# Patient Record
Sex: MALE | Race: WHITE | NOT HISPANIC OR LATINO | Employment: UNEMPLOYED | URBAN - METROPOLITAN AREA
[De-identification: names, ages, dates, MRNs, and addresses within clinical notes are randomized per-mention and may not be internally consistent; named-entity substitution may affect disease eponyms.]

---

## 2017-01-05 ENCOUNTER — OFFICE VISIT (OUTPATIENT)
Dept: FAMILY MEDICINE CLINIC | Facility: CLINIC | Age: 77
End: 2017-01-05

## 2017-01-05 VITALS
TEMPERATURE: 98 F | HEIGHT: 65 IN | WEIGHT: 210 LBS | BODY MASS INDEX: 34.99 KG/M2 | OXYGEN SATURATION: 92 % | HEART RATE: 55 BPM | SYSTOLIC BLOOD PRESSURE: 130 MMHG | DIASTOLIC BLOOD PRESSURE: 70 MMHG

## 2017-01-05 DIAGNOSIS — E11.8 TYPE 2 DIABETES MELLITUS WITH COMPLICATION, WITH LONG-TERM CURRENT USE OF INSULIN (HCC): Primary | ICD-10-CM

## 2017-01-05 DIAGNOSIS — I10 ESSENTIAL HYPERTENSION: ICD-10-CM

## 2017-01-05 DIAGNOSIS — I25.10 CORONARY ARTERIOSCLEROSIS IN NATIVE ARTERY: ICD-10-CM

## 2017-01-05 DIAGNOSIS — I48.0 PAROXYSMAL ATRIAL FIBRILLATION (HCC): ICD-10-CM

## 2017-01-05 DIAGNOSIS — Z86.79 HISTORY OF ATRIAL FIBRILLATION: ICD-10-CM

## 2017-01-05 DIAGNOSIS — Z79.4 TYPE 2 DIABETES MELLITUS WITH COMPLICATION, WITH LONG-TERM CURRENT USE OF INSULIN (HCC): Primary | ICD-10-CM

## 2017-01-05 PROCEDURE — 99213 OFFICE O/P EST LOW 20 MIN: CPT | Performed by: FAMILY MEDICINE

## 2017-01-05 RX ORDER — SIMVASTATIN 80 MG
80 TABLET ORAL DAILY
Qty: 90 TABLET | Refills: 3 | Status: SHIPPED | OUTPATIENT
Start: 2017-01-05 | End: 2018-01-04 | Stop reason: SDUPTHER

## 2017-01-05 RX ORDER — FUROSEMIDE 40 MG/1
40 TABLET ORAL DAILY
Qty: 90 TABLET | Refills: 3 | Status: SHIPPED | OUTPATIENT
Start: 2017-01-05 | End: 2018-01-04 | Stop reason: SDUPTHER

## 2017-01-05 RX ORDER — METOPROLOL TARTRATE 50 MG/1
50 TABLET, FILM COATED ORAL 2 TIMES DAILY
Qty: 180 TABLET | Refills: 3 | Status: SHIPPED | OUTPATIENT
Start: 2017-01-05 | End: 2018-01-04 | Stop reason: SDUPTHER

## 2017-01-05 RX ORDER — NIACIN 250 MG
250 TABLET ORAL
COMMUNITY
End: 2017-12-19 | Stop reason: ALTCHOICE

## 2017-01-05 RX ORDER — ENALAPRIL MALEATE 2.5 MG/1
2.5 TABLET ORAL DAILY
Qty: 90 TABLET | Refills: 3 | Status: SHIPPED | OUTPATIENT
Start: 2017-01-05 | End: 2018-01-04 | Stop reason: SDUPTHER

## 2017-01-05 NOTE — PROGRESS NOTES
Subjective   Bunny Chacon is a 76 y.o. male. Presents today for   Chief Complaint   Patient presents with   • Hypertension     pt here for med review and labs.   • Diabetes     pt due for diabetic foot exam.       History of Present Illness  Patient here for annual check;  Hx of dm2, htn, hld, cad and a. Fib;  On chronic anticoagulation and for dm2 insulin.  Mgmt of dm2, htn, hld by Dr. Rios and CAD and anticoagulation by Dr. Pemberton, cardiology.  Would like labs preappt for Dr. Rios of which ordered today.  Patient doing well;  Last a1c to goal;  No c/o today.  Is due for dm2 foot exam.    Review of Systems   Respiratory: Negative for shortness of breath.    Cardiovascular: Negative for chest pain, palpitations and leg swelling.   Gastrointestinal: Negative for abdominal pain, nausea and vomiting.   Neurological: Negative for syncope.       The following portions of the patient's history were reviewed and updated as appropriate: allergies, current medications, past medical history and problem list.    Patient Active Problem List   Diagnosis   • Type 2 diabetes mellitus with complications   • Hyperlipidemia   • Essential hypertension   • Coronary arteriosclerosis in native artery   • Sleep apnea   • Paroxysmal atrial fibrillation   • Macular degeneration   • Diabetic retinopathy associated with diabetes mellitus due to underlying condition   • History of atrial fibrillation       No Known Allergies    Current Outpatient Prescriptions on File Prior to Visit   Medication Sig Dispense Refill   • aspirin 81 MG tablet Take 81 mg by mouth daily.     • glucose blood test strip Testing bs 5 x day (Patient taking differently: Testing bs 6 x day ) 500 each 2   • insulin NPH (HumuLIN,NovoLIN) 100 UNIT/ML injection Inject  under the skin. 44   UNITS BREAKFAST 6 UNITS HS     • Insulin Pen Needle ([employer] PEN NEEDLES) 31G X 6 MM misc USE AS DIRECTED.     • insulin regular (HumuLIN R,NovoLIN R) 100 UNIT/ML injection Inject  "under the skin 3 (three) times a day before meals. 5 UNITS  AT BREAKFAST , 11 UNITS AT SUPPER     • Multiple Vitamin (MULTI VITAMIN DAILY) tablet Take 1 tablet by mouth daily.     • multivitamin-minerals (OCUVITE) tablet Take 1 tablet by mouth 2 (two) times a day.     • Omega-3 Fatty Acids (GNP FISH OIL) 1000 MG capsule Take 1 capsule by mouth daily.     • ONETOUCH DELICA LANCETS FINE misc      • warfarin (COUMADIN) 7.5 MG tablet Take 1 tablet by mouth daily.     • [DISCONTINUED] enalapril (VASOTEC) 2.5 MG tablet Take 2.5 mg by mouth daily.     • [DISCONTINUED] furosemide (LASIX) 40 MG tablet Take 40 mg by mouth daily.     • [DISCONTINUED] metoprolol tartrate (LOPRESSOR) 50 MG tablet Take 50 mg by mouth 2 (two) times a day.     • [DISCONTINUED] simvastatin (ZOCOR) 80 MG tablet Take 80 mg by mouth daily.       No current facility-administered medications on file prior to visit.        Objective   Vitals:    01/05/17 1332   BP: 130/70   BP Location: Right arm   Patient Position: Sitting   Cuff Size: Large Adult   Pulse: 55   Temp: 98 °F (36.7 °C)   TempSrc: Oral   SpO2: 92%   Weight: 210 lb (95.3 kg)   Height: 65\" (165.1 cm)       Physical Exam   Constitutional: He appears well-developed and well-nourished.   HENT:   Head: Normocephalic and atraumatic.   Neck: Neck supple. No JVD present. No thyromegaly present.   Cardiovascular: Normal rate, regular rhythm and normal heart sounds.  Exam reveals no gallop and no friction rub.    No murmur heard.  Pulmonary/Chest: Effort normal and breath sounds normal. No respiratory distress. He has no wheezes. He has no rales.   Abdominal: Soft. Bowel sounds are normal. He exhibits no distension. There is no tenderness. There is no rebound and no guarding.   Musculoskeletal: He exhibits no edema.    Bunny had a diabetic foot exam performed (diabetic foot exam) today.   During the foot exam he had a monofilament test performed.  Neurological: He is alert.   Skin: Skin is warm and " dry.   Psychiatric: He has a normal mood and affect. His behavior is normal.   Nursing note and vitals reviewed.      Assessment/Plan   Bunny was seen today for hypertension and diabetes.    Diagnoses and all orders for this visit:    Type 2 diabetes mellitus with complication, with long-term current use of insulin  -     Comprehensive Metabolic Panel  -     Lipid Panel  -     Hemoglobin A1c  -     Microalbumin / Creatinine Urine Ratio  -     enalapril (VASOTEC) 2.5 MG tablet; Take 1 tablet by mouth Daily.    Essential hypertension  -     Comprehensive Metabolic Panel  -     enalapril (VASOTEC) 2.5 MG tablet; Take 1 tablet by mouth Daily.  -     metoprolol tartrate (LOPRESSOR) 50 MG tablet; Take 1 tablet by mouth 2 (Two) Times a Day.    Coronary arteriosclerosis in native artery  -     Comprehensive Metabolic Panel  -     Lipid Panel  -     furosemide (LASIX) 40 MG tablet; Take 1 tablet by mouth Daily.  -     metoprolol tartrate (LOPRESSOR) 50 MG tablet; Take 1 tablet by mouth 2 (Two) Times a Day.  -     simvastatin (ZOCOR) 80 MG tablet; Take 1 tablet by mouth Daily.    Paroxysmal atrial fibrillation  -     metoprolol tartrate (LOPRESSOR) 50 MG tablet; Take 1 tablet by mouth 2 (Two) Times a Day.    History of atrial fibrillation    -f/u endocrinology and cardiology as planned.         -Follow up: 1 year       Current Outpatient Prescriptions:   •  aspirin 81 MG tablet, Take 81 mg by mouth daily., Disp: , Rfl:   •  enalapril (VASOTEC) 2.5 MG tablet, Take 1 tablet by mouth Daily., Disp: 90 tablet, Rfl: 3  •  furosemide (LASIX) 40 MG tablet, Take 1 tablet by mouth Daily., Disp: 90 tablet, Rfl: 3  •  glucose blood test strip, Testing bs 5 x day (Patient taking differently: Testing bs 6 x day ), Disp: 500 each, Rfl: 2  •  insulin NPH (HumuLIN,NovoLIN) 100 UNIT/ML injection, Inject  under the skin. 44   UNITS BREAKFAST 6 UNITS HS, Disp: , Rfl:   •  Insulin Pen Needle ([employer] PEN NEEDLES) 31G X 6 MM misc, USE AS DIRECTED.,  Disp: , Rfl:   •  insulin regular (HumuLIN R,NovoLIN R) 100 UNIT/ML injection, Inject under the skin 3 (three) times a day before meals. 5 UNITS  AT BREAKFAST , 11 UNITS AT SUPPER, Disp: , Rfl:   •  metoprolol tartrate (LOPRESSOR) 50 MG tablet, Take 1 tablet by mouth 2 (Two) Times a Day., Disp: 180 tablet, Rfl: 3  •  Multiple Vitamin (MULTI VITAMIN DAILY) tablet, Take 1 tablet by mouth daily., Disp: , Rfl:   •  multivitamin-minerals (OCUVITE) tablet, Take 1 tablet by mouth 2 (two) times a day., Disp: , Rfl:   •  niacin 250 MG tablet, Take 250 mg by mouth Daily With Breakfast., Disp: , Rfl:   •  Omega-3 Fatty Acids (GNP FISH OIL) 1000 MG capsule, Take 1 capsule by mouth daily., Disp: , Rfl:   •  ONETOUCH DELICA LANCETS FINE misc, , Disp: , Rfl:   •  simvastatin (ZOCOR) 80 MG tablet, Take 1 tablet by mouth Daily., Disp: 90 tablet, Rfl: 3  •  warfarin (COUMADIN) 7.5 MG tablet, Take 1 tablet by mouth daily., Disp: , Rfl:

## 2017-01-05 NOTE — MR AVS SNAPSHOT
Bunny Chacon   1/5/2017 1:30 PM   Office Visit    Provider:  Jose Stevens DO   Department:  [facility]   Dept Phone:  [phone]                Your Full Care Plan              Where to Get Your Medications      These medications were sent to [facility] - [place], [place] - [address] - [phone]  - [phone] FX  [address], [facility] [address]     Phone:  [phone]     enalapril 2.5 MG tablet    furosemide 40 MG tablet    metoprolol tartrate 50 MG tablet    simvastatin 80 MG tablet            Your Updated Medication List          This list is accurate as of: 1/5/17  3:27 PM.  Always use your most recent med list.                aspirin 81 MG tablet       enalapril 2.5 MG tablet   Commonly known as:  VASOTEC   Take 1 tablet by mouth Daily.       furosemide 40 MG tablet   Commonly known as:  LASIX   Take 1 tablet by mouth Daily.       glucose blood test strip   Testing bs 5 x day       GNP FISH OIL 1000 MG capsule       insulin  UNIT/ML injection   Commonly known as:  humuLIN N,novoLIN N       insulin regular 100 UNIT/ML injection   Commonly known as:  humuLIN R,novoLIN R       [employer] PEN NEEDLES 31G X 6 MM misc   Generic drug:  Insulin Pen Needle       metoprolol tartrate 50 MG tablet   Commonly known as:  LOPRESSOR   Take 1 tablet by mouth 2 (Two) Times a Day.       MULTI VITAMIN DAILY tablet       multivitamin-minerals tablet       niacin 250 MG tablet       ONETOUCH DELICA LANCETS FINE misc       simvastatin 80 MG tablet   Commonly known as:  ZOCOR   Take 1 tablet by mouth Daily.       warfarin 7.5 MG tablet   Commonly known as:  COUMADIN               We Performed the Following     Comprehensive Metabolic Panel     Hemoglobin A1c     Lipid Panel     Microalbumin / Creatinine Urine Ratio       You Were Diagnosed With        Codes Comments    Type 2 diabetes mellitus with complication, with  long-term current use of insulin    -  Primary ICD-10-CM: E11.8, Z79.4  ICD-9-CM: 250.90, V58.67     Essential hypertension     ICD-10-CM: I10  ICD-9-CM: 401.9     Coronary arteriosclerosis in native artery     ICD-10-CM: I25.10  ICD-9-CM: 414.01     Paroxysmal atrial fibrillation     ICD-10-CM: I48.0  ICD-9-CM: 427.31     History of atrial fibrillation     ICD-10-CM: Z86.79  ICD-9-CM: V12.59       Instructions     None    Patient Instructions History      [employer] Signup     [facility] [employer] allows you to send messages to your doctor, view your test results, renew your prescriptions, schedule appointments, and more. To sign up, go to [employer] and click on the Sign Up Now link in the New User? box. Enter your [employer] Activation Code exactly as it appears below along with the last four digits of your Social Security Number and your Date of Birth () to complete the sign-up process. If you do not sign up before the expiration date, you must request a new code.    [employer] Activation Code: [email or web address]  Expires: 2017  3:27 PM    If you have questions, you can email NP [email or web address] or call [phone] to talk to our [employer] staff. Remember, [employer] is NOT to be used for urgent needs. For medical emergencies, dial 911.               Other Info from Your Visit           Your Appointments     Mar 14, 2017  2:00 PM EDT   Office Visit with Ric Rios MD   [facility] (--)    [address] [facility]. [address] [place] [id number] [phone]           Arrive 15 minutes prior to appointment.              Allergies     No Known Allergies      Reason for Visit     Hypertension pt here for med review and labs.    Diabetes pt due for diabetic foot exam.      Vital Signs     Blood Pressure Pulse Temperature Height Weight Oxygen Saturation    130/70 (BP Location: Right arm, Patient Position: Sitting, Cuff Size: Large Adult) 55 98 °F (36.7 °C) (Oral)  "65\" (165.1 cm) 210 lb (95.3 kg) 92%    Body Mass Index Smoking Status                34.95 kg/m2 Former Smoker          Problems and Diagnoses Noted     Heart disease due to blocked artery    High blood pressure    History of atrial fibrillation    High cholesterol or triglycerides    Atrial fibrillation (irregular heartbeat)    Sleep apnea    Type 2 diabetes mellitus with manifestations      "

## 2017-01-06 LAB
ALBUMIN SERPL-MCNC: 4.4 G/DL (ref 3.5–4.8)
ALBUMIN/CREAT UR: 8 MG/G CREAT (ref 0–30)
ALBUMIN/GLOB SERPL: 1.9 {RATIO} (ref 1.1–2.5)
ALP SERPL-CCNC: 60 IU/L (ref 39–117)
ALT SERPL-CCNC: 20 IU/L (ref 0–44)
AST SERPL-CCNC: 23 IU/L (ref 0–40)
BILIRUB SERPL-MCNC: 0.5 MG/DL (ref 0–1.2)
BUN SERPL-MCNC: 16 MG/DL (ref 8–27)
BUN/CREAT SERPL: 19 (ref 10–22)
CALCIUM SERPL-MCNC: 9.1 MG/DL (ref 8.6–10.2)
CHLORIDE SERPL-SCNC: 102 MMOL/L (ref 96–106)
CHOLEST SERPL-MCNC: 132 MG/DL (ref 100–199)
CO2 SERPL-SCNC: 21 MMOL/L (ref 18–29)
CREAT SERPL-MCNC: 0.86 MG/DL (ref 0.76–1.27)
CREAT UR-MCNC: 39.8 MG/DL
GLOBULIN SER CALC-MCNC: 2.3 G/DL (ref 1.5–4.5)
GLUCOSE SERPL-MCNC: 159 MG/DL (ref 65–99)
HBA1C MFR BLD: 7 % (ref 4.8–5.6)
HDLC SERPL-MCNC: 40 MG/DL
LDLC SERPL CALC-MCNC: 70 MG/DL (ref 0–99)
MICROALBUMIN UR-MCNC: 3.2 UG/ML
POTASSIUM SERPL-SCNC: 4.7 MMOL/L (ref 3.5–5.2)
PROT SERPL-MCNC: 6.7 G/DL (ref 6–8.5)
SODIUM SERPL-SCNC: 142 MMOL/L (ref 134–144)
TRIGL SERPL-MCNC: 112 MG/DL (ref 0–149)
VLDLC SERPL CALC-MCNC: 22 MG/DL (ref 5–40)

## 2017-01-09 NOTE — PROGRESS NOTES
Please send to patient's endocrinologist.  Dear Bunny Chacon:    Your labs have returned and we have forwarded to your endocrinologist.    We have enclosed a copy.  If you should have any questions, please free to contact us at [phone].    Sincerely,  SOFÍA Stevens DO, MS

## 2017-01-10 ENCOUNTER — TELEPHONE (OUTPATIENT)
Dept: FAMILY MEDICINE CLINIC | Facility: CLINIC | Age: 77
End: 2017-01-10

## 2017-03-14 ENCOUNTER — OFFICE VISIT (OUTPATIENT)
Dept: ENDOCRINOLOGY | Age: 77
End: 2017-03-14

## 2017-03-14 VITALS
DIASTOLIC BLOOD PRESSURE: 58 MMHG | OXYGEN SATURATION: 93 % | BODY MASS INDEX: 34.79 KG/M2 | HEART RATE: 58 BPM | WEIGHT: 208.8 LBS | SYSTOLIC BLOOD PRESSURE: 126 MMHG | HEIGHT: 65 IN

## 2017-03-14 DIAGNOSIS — E08.319 DIABETIC RETINOPATHY ASSOCIATED WITH DIABETES MELLITUS DUE TO UNDERLYING CONDITION, MACULAR EDEMA PRESENCE UNSPECIFIED, UNSPECIFIED RETINOPATHY SEVERITY: ICD-10-CM

## 2017-03-14 DIAGNOSIS — E78.5 HYPERLIPIDEMIA, UNSPECIFIED HYPERLIPIDEMIA TYPE: ICD-10-CM

## 2017-03-14 DIAGNOSIS — I10 ESSENTIAL HYPERTENSION: ICD-10-CM

## 2017-03-14 DIAGNOSIS — Z79.4 TYPE 2 DIABETES MELLITUS WITH COMPLICATION, WITH LONG-TERM CURRENT USE OF INSULIN (HCC): Primary | ICD-10-CM

## 2017-03-14 DIAGNOSIS — I25.10 CORONARY ARTERIOSCLEROSIS IN NATIVE ARTERY: ICD-10-CM

## 2017-03-14 DIAGNOSIS — I48.0 PAROXYSMAL ATRIAL FIBRILLATION (HCC): ICD-10-CM

## 2017-03-14 DIAGNOSIS — G47.30 SLEEP APNEA, UNSPECIFIED TYPE: ICD-10-CM

## 2017-03-14 DIAGNOSIS — E11.8 TYPE 2 DIABETES MELLITUS WITH COMPLICATION, WITH LONG-TERM CURRENT USE OF INSULIN (HCC): Primary | ICD-10-CM

## 2017-03-14 PROCEDURE — 99214 OFFICE O/P EST MOD 30 MIN: CPT | Performed by: INTERNAL MEDICINE

## 2017-03-14 NOTE — PROGRESS NOTES
Subjective   Bunny Chacon is a 76 y.o. male.     HPI Comments: F/u for dm2, hyperlipidemia , hypertension, sleep apnea  / testing bs 5   X day / last dm eye exam feb 2017 / last dm foot exam today with dr Rios     Sleep Apnea     Hypertension   Identifiable causes of hypertension include sleep apnea.   Hyperlipidemia        Patient is a 76-year-old male came in for follow-up. He has type 2 diabetes mellitus and has been on Novolin N 44 and R 5 every AM and R 11 at supper and N 6 q hs. -163. ac lunch 148-180. ac supper 108-160. hs 104-168.  Bedtime blood sugars 108-148.  He was switched from analog insulin to reduce drug costs. He denies severe hypoglycemic episodes. Last meal at 10 AM      His last eye examination was in 3/16. He has macular degeneration which has been stable. He has nonproliferative diabetic retinopathy.  He will have bilateral cataract surgery . He has no microalbuminuria on urine sample taken last 1/17. He is on enalapril. He denies any paresthesias.      He has hyperlipidemia and has been on Zocor 80 mg once a day, niacin 250 mg once a day, and fish oil 1000 mg once a day. He denies any myalgia or flushing. He has gained 3 pounds since 9/16      He has hypertension and has been on chronic Coumadin therapy for intermittent atrial fibrillation. ProTime is being monitored by Dr. Stevens. Denies any bleeding. He denies any chest pain, palpitations, or shortness of breath.      He has sleep apnea and uses his CPAP regularly. He wakes up rested. He is no longer snoring.    The following portions of the patient's history were reviewed and updated as appropriate: allergies, current medications, past family history, past medical history, past social history, past surgical history and problem list.    Review of Systems   Constitutional: Negative.    HENT: Negative.    Eyes: Positive for visual disturbance ( cataracts).   Respiratory: Negative.    Cardiovascular: Negative.    Gastrointestinal:  "Negative.    Endocrine: Positive for cold intolerance.   Genitourinary: Positive for frequency.   Musculoskeletal: Positive for back pain (arthritis /in back and legs ).   Skin: Negative.    Allergic/Immunologic: Negative.    Neurological: Negative.    Hematological: Bruises/bleeds easily (on warfarin ).   Psychiatric/Behavioral: Positive for sleep disturbance ( on c cpap machine ).       Objective      Vitals:    03/14/17 1421   BP: 126/58   BP Location: Left arm   Patient Position: Sitting   Cuff Size: Large Adult   Pulse: 58   SpO2: 93%   Weight: 208 lb 12.8 oz (94.7 kg)   Height: 65\" (165.1 cm)     Physical Exam   Constitutional: He is oriented to person, place, and time. He appears well-developed and well-nourished. No distress.   HENT:   Head: Normocephalic.   Nose: Nose normal.   Mouth/Throat: No oropharyngeal exudate.   Eyes: Conjunctivae and EOM are normal. Right eye exhibits no discharge. Left eye exhibits no discharge. No scleral icterus.   Neck: Neck supple. No JVD present. No tracheal deviation present. No thyromegaly present.   Cardiovascular: Normal rate, normal heart sounds and intact distal pulses.  Exam reveals no friction rub.    No murmur heard.  Irregular rhythm   Pulmonary/Chest: No respiratory distress. He has no wheezes. He has no rales.   Abdominal: Soft. Bowel sounds are normal. He exhibits no distension and no mass. There is no tenderness.   Musculoskeletal: Normal range of motion. He exhibits no edema or deformity.   No plantar ulcers   Lymphadenopathy:     He has no cervical adenopathy.   Neurological: He is alert and oriented to person, place, and time. He has normal reflexes.   Intact light touch and vibration on both upper and lower extremities   Skin: Skin is warm and dry. No rash noted. No erythema.   Psychiatric: He has a normal mood and affect. His behavior is normal.     Office Visit on 01/05/2017   Component Date Value Ref Range Status   • Glucose 01/05/2017 159* 65 - 99 mg/dL " Final   • BUN 01/05/2017 16  8 - 27 mg/dL Final   • Creatinine 01/05/2017 0.86  0.76 - 1.27 mg/dL Final   • eGFR Non African Am 01/05/2017 85  >59 mL/min/1.73 Final   • eGFR African Am 01/05/2017 98  >59 mL/min/1.73 Final   • BUN/Creatinine Ratio 01/05/2017 19  10 - 22 Final   • Sodium 01/05/2017 142  134 - 144 mmol/L Final   • Potassium 01/05/2017 4.7  3.5 - 5.2 mmol/L Final   • Chloride 01/05/2017 102  96 - 106 mmol/L Final   • Total CO2 01/05/2017 21  18 - 29 mmol/L Final   • Calcium 01/05/2017 9.1  8.6 - 10.2 mg/dL Final   • Total Protein 01/05/2017 6.7  6.0 - 8.5 g/dL Final   • Albumin 01/05/2017 4.4  3.5 - 4.8 g/dL Final   • Globulin 01/05/2017 2.3  1.5 - 4.5 g/dL Final   • A/G Ratio 01/05/2017 1.9  1.1 - 2.5 Final   • Total Bilirubin 01/05/2017 0.5  0.0 - 1.2 mg/dL Final   • Alkaline Phosphatase 01/05/2017 60  39 - 117 IU/L Final   • AST (SGOT) 01/05/2017 23  0 - 40 IU/L Final   • ALT (SGPT) 01/05/2017 20  0 - 44 IU/L Final   • Total Cholesterol 01/05/2017 132  100 - 199 mg/dL Final   • Triglycerides 01/05/2017 112  0 - 149 mg/dL Final   • HDL Cholesterol 01/05/2017 40  >39 mg/dL Final   • VLDL Cholesterol 01/05/2017 22  5 - 40 mg/dL Final   • LDL Cholesterol  01/05/2017 70  0 - 99 mg/dL Final   • Hemoglobin A1C 01/05/2017 7.0* 4.8 - 5.6 % Final    Comment:          Pre-diabetes: 5.7 - 6.4           Diabetes: >6.4           Glycemic control for adults with diabetes: <7.0     • Creatinine, Urine 01/05/2017 39.8  Not Estab. mg/dL Final   • Microalbumin, Urine 01/05/2017 3.2  Not Estab. ug/mL Final   • Microalbumin/Creatinine Ratio Urine 01/05/2017 8.0  0.0 - 30.0 mg/g creat Final     Assessment/Plan   Bunny was seen today for sleep apnea, hypertension and hyperlipidemia.    Diagnoses and all orders for this visit:    Type 2 diabetes mellitus with complication, with long-term current use of insulin  -     Comprehensive Metabolic Panel  -     Hemoglobin A1c  -     insulin NPH (humuLIN N,novoLIN N) 100 UNIT/ML  injection; 44   UNITS BREAKFAST and  6 UNITS HS. used Relion brand  -     insulin regular (humuLIN R,novoLIN R) 100 UNIT/ML injection; 5 UNITS  AT BREAKFAST , 11 UNITS AT SUPPER    Paroxysmal atrial fibrillation    Diabetic retinopathy associated with diabetes mellitus due to underlying condition, macular edema presence unspecified, unspecified retinopathy severity    Hyperlipidemia, unspecified hyperlipidemia type  -     Lipid Panel    Essential hypertension    Coronary arteriosclerosis in native artery    Sleep apnea, unspecified type      Continue Novolin N and Novolin R.  Check hemoglobin A1c.  Continue Zocor, niacin, and fish oil.  Continue enalapril, Lasix, and Lopressor.  Will defer blood pressure control to PCP.  Continue CPAP.    RTC 4 mos.    Send copy of my note and labs to Dr. Stevens.

## 2017-03-15 LAB
ALBUMIN SERPL-MCNC: 4.7 G/DL (ref 3.5–5.2)
ALBUMIN/GLOB SERPL: 1.7 G/DL
ALP SERPL-CCNC: 59 U/L (ref 39–117)
ALT SERPL-CCNC: 22 U/L (ref 1–41)
AST SERPL-CCNC: 17 U/L (ref 1–40)
BILIRUB SERPL-MCNC: 0.6 MG/DL (ref 0.1–1.2)
BUN SERPL-MCNC: 17 MG/DL (ref 8–23)
BUN/CREAT SERPL: 17.3 (ref 7–25)
CALCIUM SERPL-MCNC: 9.9 MG/DL (ref 8.6–10.5)
CHLORIDE SERPL-SCNC: 102 MMOL/L (ref 98–107)
CHOLEST SERPL-MCNC: 139 MG/DL (ref 0–200)
CO2 SERPL-SCNC: 24.4 MMOL/L (ref 22–29)
CREAT SERPL-MCNC: 0.98 MG/DL (ref 0.76–1.27)
GLOBULIN SER CALC-MCNC: 2.7 GM/DL
GLUCOSE SERPL-MCNC: 59 MG/DL (ref 65–99)
HBA1C MFR BLD: 7.14 % (ref 4.8–5.6)
HDLC SERPL-MCNC: 43 MG/DL (ref 40–60)
LDLC SERPL CALC-MCNC: 72 MG/DL (ref 0–100)
POTASSIUM SERPL-SCNC: 3.9 MMOL/L (ref 3.5–5.2)
PROT SERPL-MCNC: 7.4 G/DL (ref 6–8.5)
SODIUM SERPL-SCNC: 142 MMOL/L (ref 136–145)
TRIGL SERPL-MCNC: 118 MG/DL (ref 0–150)
VLDLC SERPL CALC-MCNC: 23.6 MG/DL (ref 5–40)

## 2017-05-03 ENCOUNTER — TELEPHONE (OUTPATIENT)
Dept: ENDOCRINOLOGY | Age: 77
End: 2017-05-03

## 2017-08-03 ENCOUNTER — OFFICE VISIT (OUTPATIENT)
Dept: ENDOCRINOLOGY | Age: 77
End: 2017-08-03

## 2017-08-03 VITALS
OXYGEN SATURATION: 95 % | BODY MASS INDEX: 34.29 KG/M2 | DIASTOLIC BLOOD PRESSURE: 62 MMHG | WEIGHT: 205.8 LBS | HEART RATE: 58 BPM | SYSTOLIC BLOOD PRESSURE: 136 MMHG | HEIGHT: 65 IN

## 2017-08-03 DIAGNOSIS — H35.30 MACULAR DEGENERATION: ICD-10-CM

## 2017-08-03 DIAGNOSIS — E78.5 HYPERLIPIDEMIA, UNSPECIFIED HYPERLIPIDEMIA TYPE: ICD-10-CM

## 2017-08-03 DIAGNOSIS — E08.319 DIABETIC RETINOPATHY ASSOCIATED WITH DIABETES MELLITUS DUE TO UNDERLYING CONDITION, MACULAR EDEMA PRESENCE UNSPECIFIED, UNSPECIFIED RETINOPATHY SEVERITY: ICD-10-CM

## 2017-08-03 DIAGNOSIS — I10 ESSENTIAL HYPERTENSION: ICD-10-CM

## 2017-08-03 DIAGNOSIS — Z79.4 TYPE 2 DIABETES MELLITUS WITH COMPLICATION, WITH LONG-TERM CURRENT USE OF INSULIN (HCC): Primary | ICD-10-CM

## 2017-08-03 DIAGNOSIS — E11.8 TYPE 2 DIABETES MELLITUS WITH COMPLICATION, WITH LONG-TERM CURRENT USE OF INSULIN (HCC): Primary | ICD-10-CM

## 2017-08-03 DIAGNOSIS — G47.30 SLEEP APNEA, UNSPECIFIED TYPE: ICD-10-CM

## 2017-08-03 PROCEDURE — 99214 OFFICE O/P EST MOD 30 MIN: CPT | Performed by: INTERNAL MEDICINE

## 2017-08-03 NOTE — PROGRESS NOTES
Subjective   Bunny Chacon is a 76 y.o. male.     HPI Comments: F/u for dm 2,hyperlipidemia,hypertension, sleep apnea / testing bs 5 x day / last dm eye exam 2/17/17 with dr Fortune / last dm foot exam 3/14/17 with dr Rios     Diabetes     Hypertension   Associated symptoms include neck pain ( arthritis). Identifiable causes of hypertension include sleep apnea.   Hyperlipidemia     Sleep Apnea   Associated symptoms include joint swelling and neck pain ( arthritis).      Patient is a 76-year-old male came in for follow-up. He has type 2 diabetes mellitus and has been on Novolin N 44 and R 5 every AM and R 11 at supper and N 6 q hs. -169. ac lunch . ac supper . hs .  He was switched from analog insulin to reduce drug costs. He denies severe hypoglycemic episodes. Last meal at 11 AM      His last eye examination was in 2/17. He has macular degeneration which has been stable. He has nonproliferative diabetic retinopathy.  He had bilateral cataract surgery . He has no microalbuminuria on urine sample taken last 1/17. He is on enalapril. He denies any paresthesias.      He has hyperlipidemia and has been on Zocor 80 mg once a day, niacin 250 mg once a day, and fish oil 1000 mg once a day. He denies any myalgia or flushing. He has lost 3 pounds since 3/17.      He has hypertension and has been on chronic Coumadin therapy for intermittent atrial fibrillation. ProTime is being monitored by Dr. Stevens. Denies any bleeding. He denies any chest pain, palpitations, or shortness of breath.      He has sleep apnea and uses his CPAP regularly. He wakes up rested. He is no longer snoring.    The following portions of the patient's history were reviewed and updated as appropriate: allergies, current medications, past family history, past medical history, past social history, past surgical history and problem list.    Review of Systems   Constitutional: Negative.    HENT: Negative.    Eyes: Negative.   "  Respiratory: Negative.    Cardiovascular: Negative.    Gastrointestinal: Negative.    Endocrine: Negative.    Genitourinary: Positive for frequency ( water  pill ).   Musculoskeletal: Positive for back pain ( arthritis), joint swelling and neck pain ( arthritis).   Skin: Negative.    Allergic/Immunologic: Negative.    Neurological: Negative.    Hematological: Bruises/bleeds easily ( on warfarin ).   Psychiatric/Behavioral: Positive for sleep disturbance ( sleep apnea on c pap machine ).       Objective      Vitals:    08/03/17 1338   BP: 136/62   BP Location: Left arm   Patient Position: Sitting   Cuff Size: Large Adult   Pulse: 58   SpO2: 95%   Weight: 205 lb 12.8 oz (93.4 kg)   Height: 65\" (165.1 cm)     Physical Exam   Constitutional: He is oriented to person, place, and time. He appears well-developed and well-nourished. No distress.   HENT:   Head: Normocephalic.   Nose: Nose normal.   Mouth/Throat: No oropharyngeal exudate.   Eyes: Conjunctivae and EOM are normal. Right eye exhibits no discharge. Left eye exhibits no discharge. No scleral icterus.   Neck: Neck supple. No JVD present. No tracheal deviation present. No thyromegaly present.   Cardiovascular: Normal rate, regular rhythm, normal heart sounds and intact distal pulses.  Exam reveals no friction rub.    No murmur heard.  Pulmonary/Chest: Effort normal and breath sounds normal. No respiratory distress. He has no wheezes. He has no rales. He exhibits no tenderness.   Abdominal: Soft. Bowel sounds are normal. He exhibits no distension and no mass. There is no tenderness. No hernia.   Musculoskeletal: Normal range of motion. He exhibits no edema, tenderness or deformity.   Lymphadenopathy:     He has no cervical adenopathy.   Neurological: He is alert and oriented to person, place, and time. He has normal reflexes. He displays normal reflexes.   Intact light touch in distal lower ext   Skin: Skin is warm and dry. No rash noted. No erythema. "   Psychiatric: He has a normal mood and affect. His behavior is normal.     Office Visit on 03/14/2017   Component Date Value Ref Range Status   • Glucose 03/14/2017 59* 65 - 99 mg/dL Final   • BUN 03/14/2017 17  8 - 23 mg/dL Final   • Creatinine 03/14/2017 0.98  0.76 - 1.27 mg/dL Final   • eGFR Non  Am 03/14/2017 75  >60 mL/min/1.73 Final    Comment: The MDRD GFR formula is only valid for adults with stable  renal function between ages 18 and 70.     • eGFR  Am 03/14/2017 90  >60 mL/min/1.73 Final   • BUN/Creatinine Ratio 03/14/2017 17.3  7.0 - 25.0 Final   • Sodium 03/14/2017 142  136 - 145 mmol/L Final   • Potassium 03/14/2017 3.9  3.5 - 5.2 mmol/L Final   • Chloride 03/14/2017 102  98 - 107 mmol/L Final   • Total CO2 03/14/2017 24.4  22.0 - 29.0 mmol/L Final   • Calcium 03/14/2017 9.9  8.6 - 10.5 mg/dL Final   • Total Protein 03/14/2017 7.4  6.0 - 8.5 g/dL Final   • Albumin 03/14/2017 4.70  3.50 - 5.20 g/dL Final   • Globulin 03/14/2017 2.7  gm/dL Final   • A/G Ratio 03/14/2017 1.7  g/dL Final   • Total Bilirubin 03/14/2017 0.6  0.1 - 1.2 mg/dL Final   • Alkaline Phosphatase 03/14/2017 59  39 - 117 U/L Final   • AST (SGOT) 03/14/2017 17  1 - 40 U/L Final   • ALT (SGPT) 03/14/2017 22  1 - 41 U/L Final   • Hemoglobin A1C 03/14/2017 7.14* 4.80 - 5.60 % Final    Comment: Hemoglobin A1C Ranges:  Increased Risk for Diabetes  5.7% to 6.4%  Diabetes                     >= 6.5%  Diabetic Goal                < 7.0%     • Total Cholesterol 03/14/2017 139  0 - 200 mg/dL Final   • Triglycerides 03/14/2017 118  0 - 150 mg/dL Final   • HDL Cholesterol 03/14/2017 43  40 - 60 mg/dL Final   • VLDL Cholesterol 03/14/2017 23.6  5 - 40 mg/dL Final   • LDL Cholesterol  03/14/2017 72  0 - 100 mg/dL Final     Assessment/Plan   Bunny was seen today for diabetes, hypertension, hyperlipidemia and sleep apnea.    Diagnoses and all orders for this visit:    Type 2 diabetes mellitus with complication, with long-term current use  of insulin    Diabetic retinopathy associated with diabetes mellitus due to underlying condition, macular edema presence unspecified, unspecified retinopathy severity    Essential hypertension    Hyperlipidemia, unspecified hyperlipidemia type    Macular degeneration    Sleep apnea, unspecified type      Continue Novolin N and Novolin R.  Check hemoglobin A1c.  Continue Zocor 80 mg once a day and fish oil 1000 mg once a day.  May discontinue niacin.  Continue enalapril, furosemide, Lopressor, and Coumadin per Dr. Pemberton.  Continue CPAP.  Follow-up with Dr. Fortune as scheduled.  Flu vaccine this fall    Send copy of my notes and labs to Dr. Jose Stevens and Dr. Michael Pemberton    RTC 4 mos.

## 2017-08-04 LAB
ALBUMIN SERPL-MCNC: 4.7 G/DL (ref 3.5–5.2)
ALBUMIN/GLOB SERPL: 2 G/DL
ALP SERPL-CCNC: 56 U/L (ref 39–117)
ALT SERPL-CCNC: 20 U/L (ref 1–41)
AST SERPL-CCNC: 18 U/L (ref 1–40)
BILIRUB SERPL-MCNC: 0.6 MG/DL (ref 0.1–1.2)
BUN SERPL-MCNC: 15 MG/DL (ref 8–23)
BUN/CREAT SERPL: 15.8 (ref 7–25)
CALCIUM SERPL-MCNC: 9.6 MG/DL (ref 8.6–10.5)
CHLORIDE SERPL-SCNC: 103 MMOL/L (ref 98–107)
CHOLEST SERPL-MCNC: 128 MG/DL (ref 0–200)
CO2 SERPL-SCNC: 24.2 MMOL/L (ref 22–29)
CREAT SERPL-MCNC: 0.95 MG/DL (ref 0.76–1.27)
GLOBULIN SER CALC-MCNC: 2.4 GM/DL
GLUCOSE SERPL-MCNC: 103 MG/DL (ref 65–99)
HBA1C MFR BLD: 6.74 % (ref 4.8–5.6)
HDLC SERPL-MCNC: 44 MG/DL (ref 40–60)
LDLC SERPL CALC-MCNC: 63 MG/DL (ref 0–100)
POTASSIUM SERPL-SCNC: 4.6 MMOL/L (ref 3.5–5.2)
PROT SERPL-MCNC: 7.1 G/DL (ref 6–8.5)
SODIUM SERPL-SCNC: 141 MMOL/L (ref 136–145)
T4 FREE SERPL-MCNC: 1.31 NG/DL (ref 0.93–1.7)
TRIGL SERPL-MCNC: 105 MG/DL (ref 0–150)
TSH SERPL DL<=0.005 MIU/L-ACNC: 0.76 MIU/ML (ref 0.27–4.2)
VLDLC SERPL CALC-MCNC: 21 MG/DL (ref 5–40)

## 2017-12-19 ENCOUNTER — OFFICE VISIT (OUTPATIENT)
Dept: ENDOCRINOLOGY | Age: 77
End: 2017-12-19

## 2017-12-19 VITALS
OXYGEN SATURATION: 93 % | BODY MASS INDEX: 34.99 KG/M2 | SYSTOLIC BLOOD PRESSURE: 134 MMHG | DIASTOLIC BLOOD PRESSURE: 66 MMHG | HEART RATE: 63 BPM | HEIGHT: 65 IN | WEIGHT: 210 LBS

## 2017-12-19 DIAGNOSIS — G47.30 SLEEP APNEA, UNSPECIFIED TYPE: ICD-10-CM

## 2017-12-19 DIAGNOSIS — H35.30 MACULAR DEGENERATION: ICD-10-CM

## 2017-12-19 DIAGNOSIS — I10 ESSENTIAL HYPERTENSION: ICD-10-CM

## 2017-12-19 DIAGNOSIS — E11.8 TYPE 2 DIABETES MELLITUS WITH COMPLICATION, WITH LONG-TERM CURRENT USE OF INSULIN (HCC): Primary | ICD-10-CM

## 2017-12-19 DIAGNOSIS — E78.5 HYPERLIPIDEMIA, UNSPECIFIED HYPERLIPIDEMIA TYPE: ICD-10-CM

## 2017-12-19 DIAGNOSIS — I25.10 CORONARY ARTERIOSCLEROSIS IN NATIVE ARTERY: ICD-10-CM

## 2017-12-19 DIAGNOSIS — Z79.4 TYPE 2 DIABETES MELLITUS WITH COMPLICATION, WITH LONG-TERM CURRENT USE OF INSULIN (HCC): Primary | ICD-10-CM

## 2017-12-19 PROCEDURE — 99214 OFFICE O/P EST MOD 30 MIN: CPT | Performed by: INTERNAL MEDICINE

## 2017-12-19 NOTE — PROGRESS NOTES
Subjective   Bunny Chacon is a 76 y.o. male.     HPI Comments: F/u for dm 2,hyperlipidemia, hypertension , sleep apnea / testi g bs 5 x day / last dm eye exam 8/22/17 with dr Fortune / last dm foot exam 3/14/17 with dr Rios flu vaccine @ [facility]     Diabetes     Hyperlipidemia     Hypertension   Identifiable causes of hypertension include sleep apnea.   Sleep Apnea   Associated symptoms include joint swelling (hips ).      Patient is a 76-year-old male came in for follow-up. He has type 2 diabetes mellitus and has been on Novolin N 44 and R 5 every AM and R 11 at supper and N 6 q hs. -180. ac lunch 108-175. ac supper 109-200. hs .  He was switched from analog insulin to reduce drug costs. He denies severe hypoglycemic episodes. Last meal at 9:30 AM      His last eye examination was in 8/17. He has macular degeneration which has been stable. He has no diabetic retinopathy.  He had bilateral cataract surgery . He has no microalbuminuria on urine sample taken last 1/17. He is on enalapril. He denies any paresthesias.      He has hyperlipidemia and has been on Zocor 80 mg once a day,  and fish oil 1000 mg once a day. He denies any myalgia. He has gained 5 pounds since 8/17.      He has hypertension and has been on chronic Coumadin therapy for intermittent atrial fibrillation. ProTime is being monitored by Dr. Stevens. Denies any bleeding. He denies any chest pain, palpitations, or shortness of breath.      He has sleep apnea and uses his CPAP regularly. He wakes up rested. He is no longer snoring.  He follows to Dr. Barney.    The following portions of the patient's history were reviewed and updated as appropriate: allergies, current medications, past family history, past medical history, past social history, past surgical history and problem list.    Review of Systems   Constitutional: Negative.    HENT: Negative.    Eyes: Negative.    Respiratory: Negative.    Cardiovascular: Negative.   "  Gastrointestinal: Negative.    Endocrine: Positive for cold intolerance.   Genitourinary: Positive for frequency and urgency.   Musculoskeletal: Positive for back pain and joint swelling (hips ).   Skin: Negative.    Allergic/Immunologic: Negative.    Neurological: Negative.    Hematological: Bruises/bleeds easily (on warfarin).   Psychiatric/Behavioral: Positive for sleep disturbance (sleep apnea on c pap machine ).       Objective      Vitals:    12/19/17 1336   BP: 134/66   BP Location: Right arm   Patient Position: Sitting   Cuff Size: Large Adult   Pulse: 63   SpO2: 93%   Weight: 95.3 kg (210 lb)   Height: 165.1 cm (65\")     Physical Exam   Constitutional: He is oriented to person, place, and time. He appears well-developed. No distress.   HENT:   Head: Normocephalic.   Nose: Nose normal.   Mouth/Throat: No oropharyngeal exudate.   Eyes: Conjunctivae and EOM are normal. Right eye exhibits no discharge. Left eye exhibits no discharge. No scleral icterus.   Neck: Neck supple. No JVD present. No tracheal deviation present. No thyromegaly present.   Cardiovascular: Normal rate, regular rhythm, normal heart sounds and intact distal pulses.  Exam reveals no gallop and no friction rub.    No murmur heard.  Pulmonary/Chest: Effort normal and breath sounds normal. No respiratory distress. He has no wheezes. He has no rales. He exhibits no tenderness.   Abdominal: Soft. Bowel sounds are normal. He exhibits no distension and no mass. There is no tenderness.   Musculoskeletal: Normal range of motion. He exhibits no edema, tenderness or deformity.   Lymphadenopathy:     He has no cervical adenopathy.   Neurological: He is alert and oriented to person, place, and time. He displays normal reflexes. Coordination normal.   Intact light touch   Skin: Skin is warm and dry. No rash noted. He is not diaphoretic. No erythema. No pallor.   Psychiatric: He has a normal mood and affect. His behavior is normal.     Office Visit on " 08/03/2017   Component Date Value Ref Range Status   • Glucose 08/03/2017 103* 65 - 99 mg/dL Final   • BUN 08/03/2017 15  8 - 23 mg/dL Final   • Creatinine 08/03/2017 0.95  0.76 - 1.27 mg/dL Final   • eGFR Non  Am 08/03/2017 77  >60 mL/min/1.73 Final    Comment: The MDRD GFR formula is only valid for adults with stable  renal function between ages 18 and 70.     • eGFR  Am 08/03/2017 94  >60 mL/min/1.73 Final   • BUN/Creatinine Ratio 08/03/2017 15.8  7.0 - 25.0 Final   • Sodium 08/03/2017 141  136 - 145 mmol/L Final   • Potassium 08/03/2017 4.6  3.5 - 5.2 mmol/L Final   • Chloride 08/03/2017 103  98 - 107 mmol/L Final   • Total CO2 08/03/2017 24.2  22.0 - 29.0 mmol/L Final   • Calcium 08/03/2017 9.6  8.6 - 10.5 mg/dL Final   • Total Protein 08/03/2017 7.1  6.0 - 8.5 g/dL Final   • Albumin 08/03/2017 4.70  3.50 - 5.20 g/dL Final   • Globulin 08/03/2017 2.4  gm/dL Final   • A/G Ratio 08/03/2017 2.0  g/dL Final   • Total Bilirubin 08/03/2017 0.6  0.1 - 1.2 mg/dL Final   • Alkaline Phosphatase 08/03/2017 56  39 - 117 U/L Final   • AST (SGOT) 08/03/2017 18  1 - 40 U/L Final   • ALT (SGPT) 08/03/2017 20  1 - 41 U/L Final   • Total Cholesterol 08/03/2017 128  0 - 200 mg/dL Final   • Triglycerides 08/03/2017 105  0 - 150 mg/dL Final   • HDL Cholesterol 08/03/2017 44  40 - 60 mg/dL Final   • VLDL Cholesterol 08/03/2017 21  5 - 40 mg/dL Final   • LDL Cholesterol  08/03/2017 63  0 - 100 mg/dL Final   • Hemoglobin A1C 08/03/2017 6.74* 4.80 - 5.60 % Final    Comment: Hemoglobin A1C Ranges:  Increased Risk for Diabetes  5.7% to 6.4%  Diabetes                     >= 6.5%  Diabetic Goal                < 7.0%     • TSH 08/03/2017 0.762  0.270 - 4.200 mIU/mL Final   • Free T4 08/03/2017 1.31  0.93 - 1.70 ng/dL Final     Assessment/Plan   Bunny was seen today for diabetes, hyperlipidemia, hypertension and sleep apnea.    Diagnoses and all orders for this visit:    Type 2 diabetes mellitus with complication, with  long-term current use of insulin  -     Comprehensive Metabolic Panel  -     Hemoglobin A1c    Hyperlipidemia, unspecified hyperlipidemia type  -     Lipid Panel    Essential hypertension    Coronary arteriosclerosis in native artery    Macular degeneration    Sleep apnea, unspecified type      Continue Novolin N and Novolin R.  Check hemoglobin A1c.  Continue Zocor 80 mg once a day and fish oil 1000 mg once a day.  Check lipid profile.  Continue enalapril, Lasix, and metoprolol tartrate.  Follow with Dr. Fortune as scheduled.  Continue CPAP.    Send copy of my notes and labs to Dr. Jose Stevens, Dr. Fortune and Dr. Omkar Sequeira.    RTC 4 mos.

## 2017-12-20 LAB
ALBUMIN SERPL-MCNC: 4.6 G/DL (ref 3.5–5.2)
ALBUMIN/GLOB SERPL: 1.7 G/DL
ALP SERPL-CCNC: 66 U/L (ref 39–117)
ALT SERPL-CCNC: 21 U/L (ref 1–41)
AST SERPL-CCNC: 25 U/L (ref 1–40)
BILIRUB SERPL-MCNC: 0.8 MG/DL (ref 0.1–1.2)
BUN SERPL-MCNC: 20 MG/DL (ref 8–23)
BUN/CREAT SERPL: 20.4 (ref 7–25)
CALCIUM SERPL-MCNC: 9.6 MG/DL (ref 8.6–10.5)
CHLORIDE SERPL-SCNC: 102 MMOL/L (ref 98–107)
CHOLEST SERPL-MCNC: 135 MG/DL (ref 0–200)
CO2 SERPL-SCNC: 25.4 MMOL/L (ref 22–29)
CREAT SERPL-MCNC: 0.98 MG/DL (ref 0.76–1.27)
GLOBULIN SER CALC-MCNC: 2.7 GM/DL
GLUCOSE SERPL-MCNC: 55 MG/DL (ref 65–99)
HBA1C MFR BLD: 7.01 % (ref 4.8–5.6)
HDLC SERPL-MCNC: 44 MG/DL (ref 40–60)
INTERPRETATION: NORMAL
LDLC SERPL CALC-MCNC: 71 MG/DL (ref 0–100)
Lab: NORMAL
POTASSIUM SERPL-SCNC: 4.1 MMOL/L (ref 3.5–5.2)
PROT SERPL-MCNC: 7.3 G/DL (ref 6–8.5)
SODIUM SERPL-SCNC: 141 MMOL/L (ref 136–145)
TRIGL SERPL-MCNC: 102 MG/DL (ref 0–150)
VLDLC SERPL CALC-MCNC: 20.4 MG/DL (ref 5–40)

## 2018-01-04 ENCOUNTER — OFFICE VISIT (OUTPATIENT)
Dept: FAMILY MEDICINE CLINIC | Facility: CLINIC | Age: 78
End: 2018-01-04

## 2018-01-04 VITALS
TEMPERATURE: 98.1 F | SYSTOLIC BLOOD PRESSURE: 120 MMHG | DIASTOLIC BLOOD PRESSURE: 64 MMHG | WEIGHT: 212 LBS | OXYGEN SATURATION: 95 % | BODY MASS INDEX: 35.28 KG/M2 | HEART RATE: 61 BPM

## 2018-01-04 DIAGNOSIS — I10 ESSENTIAL HYPERTENSION: ICD-10-CM

## 2018-01-04 DIAGNOSIS — I25.10 CORONARY ARTERIOSCLEROSIS IN NATIVE ARTERY: ICD-10-CM

## 2018-01-04 DIAGNOSIS — J11.1 INFLUENZA-LIKE ILLNESS: ICD-10-CM

## 2018-01-04 DIAGNOSIS — E11.8 TYPE 2 DIABETES MELLITUS WITH COMPLICATION, WITH LONG-TERM CURRENT USE OF INSULIN (HCC): ICD-10-CM

## 2018-01-04 DIAGNOSIS — I48.0 PAROXYSMAL ATRIAL FIBRILLATION (HCC): ICD-10-CM

## 2018-01-04 DIAGNOSIS — Z79.4 TYPE 2 DIABETES MELLITUS WITH COMPLICATION, WITH LONG-TERM CURRENT USE OF INSULIN (HCC): ICD-10-CM

## 2018-01-04 DIAGNOSIS — R50.9 FEVER, UNSPECIFIED FEVER CAUSE: ICD-10-CM

## 2018-01-04 DIAGNOSIS — R05.9 COUGH: Primary | ICD-10-CM

## 2018-01-04 PROBLEM — E11.9 DIABETIC EYE EXAM (HCC): Status: ACTIVE | Noted: 2018-01-04

## 2018-01-04 PROBLEM — Z01.00 DIABETIC EYE EXAM (HCC): Status: ACTIVE | Noted: 2018-01-04

## 2018-01-04 LAB
EXPIRATION DATE: NORMAL
FLUAV AG NPH QL: NORMAL
FLUBV AG NPH QL: NORMAL
INTERNAL CONTROL: NORMAL
Lab: NORMAL

## 2018-01-04 PROCEDURE — 99213 OFFICE O/P EST LOW 20 MIN: CPT | Performed by: FAMILY MEDICINE

## 2018-01-04 PROCEDURE — 87804 INFLUENZA ASSAY W/OPTIC: CPT | Performed by: FAMILY MEDICINE

## 2018-01-04 RX ORDER — SIMVASTATIN 80 MG
80 TABLET ORAL DAILY
Qty: 90 TABLET | Refills: 3 | Status: SHIPPED | OUTPATIENT
Start: 2018-01-04 | End: 2019-01-03 | Stop reason: SDUPTHER

## 2018-01-04 RX ORDER — ENALAPRIL MALEATE 2.5 MG/1
2.5 TABLET ORAL DAILY
Qty: 90 TABLET | Refills: 3 | Status: SHIPPED | OUTPATIENT
Start: 2018-01-04 | End: 2019-01-03 | Stop reason: SDUPTHER

## 2018-01-04 RX ORDER — METOPROLOL TARTRATE 50 MG/1
50 TABLET, FILM COATED ORAL EVERY 12 HOURS SCHEDULED
Qty: 180 TABLET | Refills: 3 | Status: SHIPPED | OUTPATIENT
Start: 2018-01-04 | End: 2019-01-03 | Stop reason: SDUPTHER

## 2018-01-04 RX ORDER — FUROSEMIDE 40 MG/1
40 TABLET ORAL DAILY
Qty: 90 TABLET | Refills: 3 | Status: SHIPPED | OUTPATIENT
Start: 2018-01-04 | End: 2019-01-03 | Stop reason: SDUPTHER

## 2018-01-04 NOTE — PROGRESS NOTES
Subjective   Bunny Chacon is a 77 y.o. male. Presents today for   Chief Complaint   Patient presents with   • Sinusitis     sinus drainage and congestion with cough achy and temp. x 1 week.  Med check labs and refills done/jm       Sinusitis   This is a new problem. The current episode started in the past 7 days. The problem has been gradually improving since onset. Maximum temperature: +subjective fever. The pain is moderate. Associated symptoms include chills, congestion, coughing and a sore throat. Pertinent negatives include no ear pain, shortness of breath or sinus pressure. Past treatments include oral decongestants. The treatment provided moderate relief.       Review of Systems   Constitutional: Positive for chills.   HENT: Positive for congestion and sore throat. Negative for ear pain and sinus pressure.    Respiratory: Positive for cough. Negative for shortness of breath.        The following portions of the patient's history were reviewed and updated as appropriate: allergies, current medications, past medical history and problem list.    Patient Active Problem List   Diagnosis   • DM type 2 (diabetes mellitus, type 2)   • Hyperlipidemia   • Essential hypertension   • CAD (coronary artery disease)   • Sleep apnea   • Paroxysmal atrial fibrillation   • Macular degeneration   • History of atrial fibrillation   • ASCVD (arteriosclerotic cardiovascular disease)   • Diabetic eye exam       No Known Allergies    Current Outpatient Prescriptions on File Prior to Visit   Medication Sig Dispense Refill   • aspirin 81 MG tablet Take 81 mg by mouth daily.     • glucose blood test strip Testing bs 5 x day  Dx code E11.8 500 each 1   • insulin NPH (humuLIN N,novoLIN N) 100 UNIT/ML injection 44   UNITS BREAKFAST and  6 UNITS HS. used Relion brand 6 each 2   • Insulin Pen Needle ([employer] PEN NEEDLES) 31G X 6 MM misc USE AS DIRECTED.     • insulin regular (humuLIN R,novoLIN R) 100 UNIT/ML injection 5 UNITS  AT  BREAKFAST , 11 UNITS AT SUPPER 3 each 2   • Multiple Vitamin (MULTI VITAMIN DAILY) tablet Take 1 tablet by mouth daily.     • multivitamin-minerals (OCUVITE) tablet Take 1 tablet by mouth 2 (two) times a day.     • Omega-3 Fatty Acids (GNP FISH OIL) 1000 MG capsule Take 1 capsule by mouth daily.     • ONETOUCH DELICA LANCETS FINE misc      • warfarin (COUMADIN) 7.5 MG tablet Take 1 tablet by mouth Daily. 1 1/2 tablet tues & thur 1 tablet the rest of the week     • [DISCONTINUED] enalapril (VASOTEC) 2.5 MG tablet Take 1 tablet by mouth Daily. 90 tablet 3   • [DISCONTINUED] furosemide (LASIX) 40 MG tablet Take 1 tablet by mouth Daily. 90 tablet 3   • [DISCONTINUED] metoprolol tartrate (LOPRESSOR) 50 MG tablet Take 1 tablet by mouth 2 (Two) Times a Day. 180 tablet 3   • [DISCONTINUED] simvastatin (ZOCOR) 80 MG tablet Take 1 tablet by mouth Daily. 90 tablet 3     No current facility-administered medications on file prior to visit.        Objective   Vitals:    01/04/18 1246   BP: 120/64   Pulse: 61   Temp: 98.1 °F (36.7 °C)   SpO2: 95%   Weight: 96.2 kg (212 lb)       Physical Exam   Constitutional: He appears well-developed and well-nourished.   HENT:   Head: Normocephalic and atraumatic.   Right Ear: Tympanic membrane normal.   Left Ear: Tympanic membrane normal.   Nose: Mucosal edema present.   Mouth/Throat: Uvula is midline, oropharynx is clear and moist and mucous membranes are normal.   Neck: Neck supple. No JVD present. No thyromegaly present.   Cardiovascular: Normal rate, regular rhythm and normal heart sounds.  Exam reveals no gallop and no friction rub.    No murmur heard.  Pulmonary/Chest: Effort normal and breath sounds normal. No respiratory distress. He has no wheezes. He has no rales.   Abdominal: Soft. Bowel sounds are normal. He exhibits no distension. There is no tenderness. There is no rebound and no guarding.   Musculoskeletal: He exhibits no edema.   Neurological: He is alert.   Skin: Skin is warm  and dry.   Psychiatric: He has a normal mood and affect. His behavior is normal.   Nursing note and vitals reviewed.      FLU Negative    Assessment/Plan   Bunny was seen today for sinusitis.    Diagnoses and all orders for this visit:    Cough  -     POC Influenza A / B    Essential hypertension  -     enalapril (VASOTEC) 2.5 MG tablet; Take 1 tablet by mouth Daily.  -     metoprolol tartrate (LOPRESSOR) 50 MG tablet; Take 1 tablet by mouth Every 12 (Twelve) Hours.    Type 2 diabetes mellitus with complication, with long-term current use of insulin  -     enalapril (VASOTEC) 2.5 MG tablet; Take 1 tablet by mouth Daily.    Coronary arteriosclerosis in native artery  -     metoprolol tartrate (LOPRESSOR) 50 MG tablet; Take 1 tablet by mouth Every 12 (Twelve) Hours.  -     simvastatin (ZOCOR) 80 MG tablet; Take 1 tablet by mouth Daily.  -     furosemide (LASIX) 40 MG tablet; Take 1 tablet by mouth Daily.    Paroxysmal atrial fibrillation  -     metoprolol tartrate (LOPRESSOR) 50 MG tablet; Take 1 tablet by mouth Every 12 (Twelve) Hours.    Fever, unspecified fever cause  -     POC Influenza A / B    Influenza-like illness    -likely viral illness, doing better; recommend just continue fluids  -requests medication refills as hx of a. Fib, cad, dm2 and htn;  Refilled today, though mgmt per endocrinology and cardiology  -hypertension - controlled, continue medications  -dm2 - adequate control based on last labs  -cad RF reduction, Lipid, BP and BS control.           -Follow up: 1 year       Current Outpatient Prescriptions:   •  aspirin 81 MG tablet, Take 81 mg by mouth daily., Disp: , Rfl:   •  enalapril (VASOTEC) 2.5 MG tablet, Take 1 tablet by mouth Daily., Disp: 90 tablet, Rfl: 3  •  furosemide (LASIX) 40 MG tablet, Take 1 tablet by mouth Daily., Disp: 90 tablet, Rfl: 3  •  glucose blood test strip, Testing bs 5 x day  Dx code E11.8, Disp: 500 each, Rfl: 1  •  insulin NPH (humuLIN N,novoLIN N) 100 UNIT/ML injection,  44   UNITS BREAKFAST and  6 UNITS HS. used Relion brand, Disp: 6 each, Rfl: 2  •  Insulin Pen Needle ([employer] PEN NEEDLES) 31G X 6 MM misc, USE AS DIRECTED., Disp: , Rfl:   •  insulin regular (humuLIN R,novoLIN R) 100 UNIT/ML injection, 5 UNITS  AT BREAKFAST , 11 UNITS AT SUPPER, Disp: 3 each, Rfl: 2  •  metoprolol tartrate (LOPRESSOR) 50 MG tablet, Take 1 tablet by mouth Every 12 (Twelve) Hours., Disp: 180 tablet, Rfl: 3  •  Multiple Vitamin (MULTI VITAMIN DAILY) tablet, Take 1 tablet by mouth daily., Disp: , Rfl:   •  multivitamin-minerals (OCUVITE) tablet, Take 1 tablet by mouth 2 (two) times a day., Disp: , Rfl:   •  Omega-3 Fatty Acids (GNP FISH OIL) 1000 MG capsule, Take 1 capsule by mouth daily., Disp: , Rfl:   •  ONETOUCH DELICA LANCETS FINE misc, , Disp: , Rfl:   •  simvastatin (ZOCOR) 80 MG tablet, Take 1 tablet by mouth Daily., Disp: 90 tablet, Rfl: 3  •  warfarin (COUMADIN) 7.5 MG tablet, Take 1 tablet by mouth Daily. 1 1/2 tablet tues & thur 1 tablet the rest of the week, Disp: , Rfl:

## 2018-02-05 DIAGNOSIS — Z79.4 TYPE 2 DIABETES MELLITUS WITH COMPLICATION, WITH LONG-TERM CURRENT USE OF INSULIN (HCC): ICD-10-CM

## 2018-02-05 DIAGNOSIS — E11.8 TYPE 2 DIABETES MELLITUS WITH COMPLICATION, WITH LONG-TERM CURRENT USE OF INSULIN (HCC): ICD-10-CM

## 2018-02-05 RX ORDER — HUMAN INSULIN 100 [IU]/ML
INJECTION, SOLUTION SUBCUTANEOUS
Qty: 20 ML | Refills: 1 | Status: SHIPPED | OUTPATIENT
Start: 2018-02-05 | End: 2018-02-07 | Stop reason: SDUPTHER

## 2018-02-07 DIAGNOSIS — E11.8 TYPE 2 DIABETES MELLITUS WITH COMPLICATION, WITH LONG-TERM CURRENT USE OF INSULIN (HCC): ICD-10-CM

## 2018-02-07 DIAGNOSIS — Z79.4 TYPE 2 DIABETES MELLITUS WITH COMPLICATION, WITH LONG-TERM CURRENT USE OF INSULIN (HCC): ICD-10-CM

## 2018-03-12 ENCOUNTER — OFFICE VISIT (OUTPATIENT)
Dept: FAMILY MEDICINE CLINIC | Facility: CLINIC | Age: 78
End: 2018-03-12

## 2018-03-12 VITALS
BODY MASS INDEX: 35.65 KG/M2 | HEART RATE: 73 BPM | WEIGHT: 214 LBS | DIASTOLIC BLOOD PRESSURE: 58 MMHG | HEIGHT: 65 IN | SYSTOLIC BLOOD PRESSURE: 120 MMHG | OXYGEN SATURATION: 95 % | TEMPERATURE: 98.2 F

## 2018-03-12 DIAGNOSIS — Z48.02 VISIT FOR SUTURE REMOVAL: Primary | ICD-10-CM

## 2018-03-12 DIAGNOSIS — T14.8XXA WOUND INFECTION: ICD-10-CM

## 2018-03-12 DIAGNOSIS — L08.9 WOUND INFECTION: ICD-10-CM

## 2018-03-12 PROCEDURE — 99213 OFFICE O/P EST LOW 20 MIN: CPT | Performed by: FAMILY MEDICINE

## 2018-03-12 RX ORDER — CEPHALEXIN 500 MG/1
500 CAPSULE ORAL 4 TIMES DAILY
Qty: 40 CAPSULE | Refills: 0 | Status: SHIPPED | OUTPATIENT
Start: 2018-03-12 | End: 2018-05-03

## 2018-03-12 NOTE — PROGRESS NOTES
Subjective   Bunny Chacon is a 77 y.o. male. Presents today for   Chief Complaint   Patient presents with   • Suture / Staple Removal     pt here for suture removal.       History of Present Illness  Patient here for f/u of left thumb laceration, developing redness;  Worried FB as pulling small metal slivers out.   No pain; no f/c;   Left thumb x 4 sutures.  Review of Systems   Constitutional: Negative for chills and fever.   Skin: Positive for wound.       The following portions of the patient's history were reviewed and updated as appropriate: allergies, current medications, past medical history and problem list.    Patient Active Problem List   Diagnosis   • DM type 2 (diabetes mellitus, type 2)   • Hyperlipidemia   • Essential hypertension   • CAD (coronary artery disease)   • Sleep apnea   • Paroxysmal atrial fibrillation   • Macular degeneration   • History of atrial fibrillation   • ASCVD (arteriosclerotic cardiovascular disease)   • Diabetic eye exam       No Known Allergies    Current Outpatient Prescriptions on File Prior to Visit   Medication Sig Dispense Refill   • aspirin 81 MG tablet Take 81 mg by mouth daily.     • enalapril (VASOTEC) 2.5 MG tablet Take 1 tablet by mouth Daily. 90 tablet 3   • furosemide (LASIX) 40 MG tablet Take 1 tablet by mouth Daily. 90 tablet 3   • glucose blood test strip Testing bs 5 x day  Dx code E11.8 500 each 1   • insulin NPH (humuLIN N,novoLIN N) 100 UNIT/ML injection 44   UNITS BREAKFAST and  6 UNITS HS. used Relion brand 40 mL 1   • Insulin Pen Needle ([employer] PEN NEEDLES) 31G X 6 MM misc USE AS DIRECTED.     • insulin regular (NOVOLIN R RELION) 100 UNIT/ML injection 5 units at breakfast and 11 units at dinner 30 mL 1   • metoprolol tartrate (LOPRESSOR) 50 MG tablet Take 1 tablet by mouth Every 12 (Twelve) Hours. 180 tablet 3   • Multiple Vitamin (MULTI VITAMIN DAILY) tablet Take 1 tablet by mouth daily.     • multivitamin-minerals (OCUVITE) tablet Take 1 tablet by  "mouth 2 (two) times a day.     • Omega-3 Fatty Acids (GNP FISH OIL) 1000 MG capsule Take 1 capsule by mouth daily.     • ONETOUCH DELICA LANCETS FINE misc      • simvastatin (ZOCOR) 80 MG tablet Take 1 tablet by mouth Daily. 90 tablet 3   • warfarin (COUMADIN) 7.5 MG tablet Take 1 tablet by mouth Daily. 1 1/2 tablet tues & thur 1 tablet the rest of the week       No current facility-administered medications on file prior to visit.        Objective   Vitals:    03/12/18 1704   BP: 120/58   BP Location: Left arm   Patient Position: Sitting   Cuff Size: Large Adult   Pulse: 73   Temp: 98.2 °F (36.8 °C)   TempSrc: Oral   SpO2: 95%   Weight: 97.1 kg (214 lb)   Height: 165.1 cm (65\")       Physical Exam   Constitutional: He appears well-developed and well-nourished.   Neck: Neck supple.   Neurological: He is alert.   Skin: Skin is warm and dry.   Removed x 4 sutures, no purulent drainage.  Small amt of erythema brook[place]nn-wound.  Metal splinter noted and removed fragments near laceration.  No palpable pain of laceration nor feel FB.   Applied ointment and bandage.   Psychiatric: He has a normal mood and affect. His behavior is normal.   Nursing note and vitals reviewed.      Assessment/Plan   Bunny was seen today for suture / staple removal.    Diagnoses and all orders for this visit:    Visit for suture removal    Wound infection  -     cephalexin (KEFLEX) 500 MG capsule; Take 1 capsule by mouth 4 (Four) Times a Day.    -apply abx oint bid  -keflex as directed  -return if pain or no healing       -Follow up:Prn - RTC if worse or no improvement.           Current Outpatient Prescriptions:   •  aspirin 81 MG tablet, Take 81 mg by mouth daily., Disp: , Rfl:   •  enalapril (VASOTEC) 2.5 MG tablet, Take 1 tablet by mouth Daily., Disp: 90 tablet, Rfl: 3  •  furosemide (LASIX) 40 MG tablet, Take 1 tablet by mouth Daily., Disp: 90 tablet, Rfl: 3  •  glucose blood test strip, Testing bs 5 x day  Dx code E11.8, Disp: 500 each, Rfl: 1  • "  insulin NPH (humuLIN N,novoLIN N) 100 UNIT/ML injection, 44   UNITS BREAKFAST and  6 UNITS HS. used Relion brand, Disp: 40 mL, Rfl: 1  •  Insulin Pen Needle ([employer] PEN NEEDLES) 31G X 6 MM misc, USE AS DIRECTED., Disp: , Rfl:   •  insulin regular (NOVOLIN R RELION) 100 UNIT/ML injection, 5 units at breakfast and 11 units at dinner, Disp: 30 mL, Rfl: 1  •  metoprolol tartrate (LOPRESSOR) 50 MG tablet, Take 1 tablet by mouth Every 12 (Twelve) Hours., Disp: 180 tablet, Rfl: 3  •  Multiple Vitamin (MULTI VITAMIN DAILY) tablet, Take 1 tablet by mouth daily., Disp: , Rfl:   •  multivitamin-minerals (OCUVITE) tablet, Take 1 tablet by mouth 2 (two) times a day., Disp: , Rfl:   •  Omega-3 Fatty Acids (GNP FISH OIL) 1000 MG capsule, Take 1 capsule by mouth daily., Disp: , Rfl:   •  ONETOUCH DELICA LANCSAV FINE misc, , Disp: , Rfl:   •  simvastatin (ZOCOR) 80 MG tablet, Take 1 tablet by mouth Daily., Disp: 90 tablet, Rfl: 3  •  warfarin (COUMADIN) 7.5 MG tablet, Take 1 tablet by mouth Daily. 1 1/2 tablet tues & thur 1 tablet the rest of the week, Disp: , Rfl:

## 2018-05-03 ENCOUNTER — OFFICE VISIT (OUTPATIENT)
Dept: ENDOCRINOLOGY | Age: 78
End: 2018-05-03

## 2018-05-03 VITALS
OXYGEN SATURATION: 98 % | BODY MASS INDEX: 36.25 KG/M2 | DIASTOLIC BLOOD PRESSURE: 70 MMHG | WEIGHT: 217.6 LBS | HEART RATE: 56 BPM | SYSTOLIC BLOOD PRESSURE: 118 MMHG | HEIGHT: 65 IN

## 2018-05-03 DIAGNOSIS — Z79.4 TYPE 2 DIABETES MELLITUS WITH COMPLICATION, WITH LONG-TERM CURRENT USE OF INSULIN (HCC): ICD-10-CM

## 2018-05-03 DIAGNOSIS — I25.10 CORONARY ARTERY DISEASE INVOLVING NATIVE CORONARY ARTERY OF NATIVE HEART WITHOUT ANGINA PECTORIS: ICD-10-CM

## 2018-05-03 DIAGNOSIS — Z79.4 TYPE 2 DIABETES MELLITUS WITH COMPLICATION, WITH LONG-TERM CURRENT USE OF INSULIN (HCC): Primary | ICD-10-CM

## 2018-05-03 DIAGNOSIS — I10 ESSENTIAL HYPERTENSION: ICD-10-CM

## 2018-05-03 DIAGNOSIS — E78.5 HYPERLIPIDEMIA, UNSPECIFIED HYPERLIPIDEMIA TYPE: ICD-10-CM

## 2018-05-03 DIAGNOSIS — G47.30 SLEEP APNEA, UNSPECIFIED TYPE: ICD-10-CM

## 2018-05-03 DIAGNOSIS — E11.8 TYPE 2 DIABETES MELLITUS WITH COMPLICATION, WITH LONG-TERM CURRENT USE OF INSULIN (HCC): Primary | ICD-10-CM

## 2018-05-03 DIAGNOSIS — E11.8 TYPE 2 DIABETES MELLITUS WITH COMPLICATION, WITH LONG-TERM CURRENT USE OF INSULIN (HCC): ICD-10-CM

## 2018-05-03 PROCEDURE — 99214 OFFICE O/P EST MOD 30 MIN: CPT | Performed by: INTERNAL MEDICINE

## 2018-05-03 NOTE — PROGRESS NOTES
Subjective   Bunny Chacon is a 77 y.o. male.     F/u for dm 2,hyperlipidemia, hypertension, sleep apnea / testing bs 5 x day / last dm eye exam 3/29/18 with dr Fortune / last dm foot exam today with dr Rios       Diabetes     Hyperlipidemia     Hypertension   Identifiable causes of hypertension include sleep apnea.   Sleep Apnea        Patient is a 76-year-old male came in for follow-up. He has type 2 diabetes mellitus and has been on Novolin N 44 and R 5 every AM and R 11 at supper and N 6 q hs. FBS . ac lunch 108-201. ac supper 117-206. hs 106-151.  He was switched from analog insulin to human insulin to reduce drug costs. He denies severe hypoglycemic episodes. Last meal at 9:30 AM      His last eye examination was in 3/18. He has macular degeneration and intraocular injection on right eye.   He has blurry vision on his left eye.  He had previous bilateral cataract surgery . He has no microalbuminuria on urine sample taken last 1/17. He is on enalapril. He denies any paresthesias.      He has hyperlipidemia and has been on Zocor 80 mg once a day,  and fish oil 1000 mg once a day. He denies any myalgia. He has gained 7 pounds since 12/17.      He has hypertension and has been on chronic Coumadin therapy for intermittent atrial fibrillation. [facility] is being monitored by Dr. Stevens. Denies any bleeding. He denies any chest pain, palpitations, or shortness of breath.      He has sleep apnea and uses his CPAP regularly. He wakes up rested. He is no longer snoring.  He follows to Dr. Barney.    The following portions of the patient's history were reviewed and updated as appropriate: allergies, current medications, past family history, past medical history, past social history, past surgical history and problem list.    Review of Systems   Constitutional: Negative.    HENT: Negative.    Eyes: Positive for visual disturbance.   Respiratory: Negative.    Cardiovascular: Negative.    Gastrointestinal:  "Negative.    Endocrine: Negative.    Genitourinary: Negative.    Musculoskeletal: Negative.    Skin: Negative.    Allergic/Immunologic: Negative.    Neurological: Negative.    Hematological: Bruises/bleeds easily ( on warfarin ).   Psychiatric/Behavioral: Positive for sleep disturbance ( sleep apnea on CPAP machine ).       Objective      Vitals:    05/03/18 1429   BP: 118/70   BP Location: Right arm   Patient Position: Sitting   Cuff Size: Large Adult   Pulse: 56   SpO2: 98%   Weight: 98.7 kg (217 lb 9.6 oz)   Height: 165.1 cm (65\")     Physical Exam   Constitutional: He is oriented to person, place, and time. He appears well-developed and well-nourished. No distress.   HENT:   Head: Normocephalic.   Nose: Nose normal.   Mouth/Throat: No oropharyngeal exudate.   Eyes: Conjunctivae and EOM are normal. Right eye exhibits no discharge. Left eye exhibits no discharge. No scleral icterus.   Neck: Normal range of motion. Neck supple. No JVD present. No tracheal deviation present. No thyromegaly present.   Cardiovascular: Normal rate, regular rhythm, normal heart sounds and intact distal pulses.  Exam reveals no gallop and no friction rub.    No murmur heard.  Pulmonary/Chest: Effort normal and breath sounds normal. No respiratory distress. He has no wheezes. He has no rales.   Abdominal: Soft. Bowel sounds are normal. He exhibits no distension and no mass. There is no tenderness. There is no guarding.   Musculoskeletal: Normal range of motion. He exhibits no edema, tenderness or deformity.   Lymphadenopathy:     He has no cervical adenopathy.   Neurological: He is alert and oriented to person, place, and time. He has normal reflexes.   Intact light touch   Skin: Skin is warm and dry. No rash noted. No erythema.   Psychiatric: He has a normal mood and affect. His behavior is normal.     Office Visit on 01/04/2018   Component Date Value Ref Range Status   • Rapid Influenza A Ag 01/04/2018 neg*  Final   • Rapid Influenza B " Ag 01/04/2018 neg   Final   • Internal Control 01/04/2018 Passed  Passed Final   • Lot Number 01/04/2018 [id number]   Final   • Expiration Date 01/04/2018 [id number]   Final     Assessment/Plan   Bunny was seen today for diabetes, hyperlipidemia, hypertension and sleep apnea.    Diagnoses and all orders for this visit:    Type 2 diabetes mellitus with complication, with long-term current use of insulin  -     Comprehensive Metabolic Panel  -     Hemoglobin A1c  -     insulin NPH (humuLIN N,novoLIN N) 100 UNIT/ML injection; 44   UNITS BREAKFAST and  6 UNITS HS. use Relion brand  -     insulin regular (NOVOLIN R RELION) 100 UNIT/ML injection; 5 units at breakfast and 11 units at dinner  -     Microalbumin / Creatinine Urine Ratio - Urine, Clean Catch    Hyperlipidemia, unspecified hyperlipidemia type  -     Comprehensive Metabolic Panel  -     Lipid Panel    Essential hypertension  -     Comprehensive Metabolic Panel    Coronary artery disease involving native coronary artery of native heart without angina pectoris  -     Comprehensive Metabolic Panel  -     Lipid Panel    Sleep apnea, unspecified type  -     TSH  -     T4, Free      Continue Relion N and R  Continue Zocor and fish oil.  Continue CPAP.  Continue enalapril, Lasix, and metoprolol.    RTC 4 mos.    Send copy of my note and labs to Dr. Stevens

## 2018-05-04 LAB
ALBUMIN SERPL-MCNC: 4.3 G/DL (ref 3.5–5.2)
ALBUMIN/CREAT UR: <18.5 MG/G CREAT (ref 0–30)
ALBUMIN/GLOB SERPL: 1.6 G/DL
ALP SERPL-CCNC: 65 U/L (ref 39–117)
ALT SERPL-CCNC: 21 U/L (ref 1–41)
AST SERPL-CCNC: 22 U/L (ref 1–40)
BILIRUB SERPL-MCNC: 0.6 MG/DL (ref 0.1–1.2)
BUN SERPL-MCNC: 14 MG/DL (ref 8–23)
BUN/CREAT SERPL: 14.9 (ref 7–25)
CALCIUM SERPL-MCNC: 9.4 MG/DL (ref 8.6–10.5)
CHLORIDE SERPL-SCNC: 103 MMOL/L (ref 98–107)
CHOLEST SERPL-MCNC: 129 MG/DL (ref 0–200)
CO2 SERPL-SCNC: 26.6 MMOL/L (ref 22–29)
CREAT SERPL-MCNC: 0.94 MG/DL (ref 0.76–1.27)
CREAT UR-MCNC: 16.2 MG/DL
GFR SERPLBLD CREATININE-BSD FMLA CKD-EPI: 78 ML/MIN/1.73
GFR SERPLBLD CREATININE-BSD FMLA CKD-EPI: 95 ML/MIN/1.73
GLOBULIN SER CALC-MCNC: 2.7 GM/DL
GLUCOSE SERPL-MCNC: 44 MG/DL (ref 65–99)
HBA1C MFR BLD: 6.9 % (ref 4.8–5.6)
HDLC SERPL-MCNC: 41 MG/DL (ref 40–60)
INTERPRETATION: NORMAL
LDLC SERPL CALC-MCNC: 67 MG/DL (ref 0–100)
Lab: NORMAL
MICROALBUMIN UR-MCNC: <3 UG/ML
POTASSIUM SERPL-SCNC: 4.4 MMOL/L (ref 3.5–5.2)
PROT SERPL-MCNC: 7 G/DL (ref 6–8.5)
SODIUM SERPL-SCNC: 143 MMOL/L (ref 136–145)
T4 FREE SERPL-MCNC: 1.3 NG/DL (ref 0.93–1.7)
TRIGL SERPL-MCNC: 107 MG/DL (ref 0–150)
TSH SERPL DL<=0.005 MIU/L-ACNC: 0.96 MIU/ML (ref 0.27–4.2)
VLDLC SERPL CALC-MCNC: 21.4 MG/DL (ref 5–40)

## 2019-01-03 ENCOUNTER — OFFICE VISIT (OUTPATIENT)
Dept: FAMILY MEDICINE CLINIC | Facility: CLINIC | Age: 79
End: 2019-01-03

## 2019-01-03 VITALS
OXYGEN SATURATION: 96 % | BODY MASS INDEX: 36.78 KG/M2 | SYSTOLIC BLOOD PRESSURE: 110 MMHG | DIASTOLIC BLOOD PRESSURE: 66 MMHG | HEART RATE: 65 BPM | WEIGHT: 221 LBS | TEMPERATURE: 97.7 F

## 2019-01-03 DIAGNOSIS — Z79.4 TYPE 2 DIABETES MELLITUS WITH COMPLICATION, WITH LONG-TERM CURRENT USE OF INSULIN (HCC): ICD-10-CM

## 2019-01-03 DIAGNOSIS — I48.0 PAROXYSMAL ATRIAL FIBRILLATION (HCC): ICD-10-CM

## 2019-01-03 DIAGNOSIS — I10 ESSENTIAL HYPERTENSION: ICD-10-CM

## 2019-01-03 DIAGNOSIS — E11.8 TYPE 2 DIABETES MELLITUS WITH COMPLICATION, WITH LONG-TERM CURRENT USE OF INSULIN (HCC): ICD-10-CM

## 2019-01-03 DIAGNOSIS — I25.10 CORONARY ARTERIOSCLEROSIS IN NATIVE ARTERY: ICD-10-CM

## 2019-01-03 DIAGNOSIS — Z00.00 MEDICARE ANNUAL WELLNESS VISIT, SUBSEQUENT: Primary | ICD-10-CM

## 2019-01-03 PROBLEM — Z79.01 LONG TERM CURRENT USE OF ANTICOAGULANT: Status: ACTIVE | Noted: 2018-06-04

## 2019-01-03 PROCEDURE — G0439 PPPS, SUBSEQ VISIT: HCPCS | Performed by: FAMILY MEDICINE

## 2019-01-03 RX ORDER — SIMVASTATIN 80 MG
80 TABLET ORAL DAILY
Qty: 90 TABLET | Refills: 3 | Status: SHIPPED | OUTPATIENT
Start: 2019-01-03 | End: 2019-01-09 | Stop reason: SDUPTHER

## 2019-01-03 RX ORDER — ENALAPRIL MALEATE 2.5 MG/1
2.5 TABLET ORAL DAILY
Qty: 90 TABLET | Refills: 3 | Status: SHIPPED | OUTPATIENT
Start: 2019-01-03 | End: 2019-01-09 | Stop reason: SDUPTHER

## 2019-01-03 RX ORDER — FUROSEMIDE 40 MG/1
40 TABLET ORAL DAILY
Qty: 90 TABLET | Refills: 3 | Status: SHIPPED | OUTPATIENT
Start: 2019-01-03 | End: 2019-01-09 | Stop reason: SDUPTHER

## 2019-01-03 RX ORDER — METOPROLOL TARTRATE 50 MG/1
50 TABLET, FILM COATED ORAL EVERY 12 HOURS SCHEDULED
Qty: 180 TABLET | Refills: 3 | Status: SHIPPED | OUTPATIENT
Start: 2019-01-03 | End: 2019-01-09 | Stop reason: SDUPTHER

## 2019-01-03 NOTE — PATIENT INSTRUCTIONS
Medicare Wellness  Personal Prevention Plan of Service     Date of Office Visit:  2019  Encounter Provider:  Jose Stevens DO  Place of Service:  [facility]  Patient Name: Bunny Chacon  :  1940    As part of the Medicare Wellness portion of your visit today, we are providing you with this personalized preventive plan of services (PPPS). This plan is based upon recommendations of the [place] Preventive Services Task Force (USPSTF) and the Advisory Committee on Immunization Practices (ACIP).    This lists the preventive care services that should be considered, and provides dates of when you are due. Items listed as completed are up-to-date and do not require any further intervention.    Health Maintenance   Topic Date Due   • HEPATITIS A VACCINE ADULT (1 of 2) 1958   • PNEUMOCOCCAL VACCINES (65+ LOW/MEDIUM RISK) (2 of 2 - PPSV23) 2016   • MEDICARE ANNUAL WELLNESS  2016   • ZOSTER VACCINE (2 of 2) 2017   • HEMOGLOBIN A1C  2018   • LIPID PANEL  2019   • URINE MICROALBUMIN  2019   • COLONOSCOPY  2028   • TDAP/TD VACCINES (2 - Td) 2028   • INFLUENZA VACCINE  Completed       No orders of the defined types were placed in this encounter.      Return in about 1 year (around 1/3/2020), or if symptoms worsen or fail to improve.        Advance Directive  Advance directives are legal documents that let you make choices ahead of time about your health care and medical treatment in case you become unable to communicate for yourself. Advance directives are a way for you to communicate your wishes to family, friends, and health care providers. This can help convey your decisions about end-of-life care if you become unable to communicate.  Discussing and writing advance directives should happen over time rather than all at once. Advance directives can be changed depending on your situation and what you want, even  after you have signed the advance directives.  If you do not have an advance directive, some states assign family decision makers to act on your behalf based on how closely you are related to them. Each state has its own laws regarding advance directives. You may want to check with your health care provider, , or state representative about the laws in your state. There are different types of advance directives, such as:  · Medical power of .  · Living will.  · Do not resuscitate (DNR) or do not attempt resuscitation (DNAR) order.    Health care proxy and medical power of   A health care proxy, also called a health care agent, is a person who is appointed to make medical decisions for you in cases in which you are unable to make the decisions yourself. Generally, people choose someone they know well and trust to represent their preferences. Make sure to ask this person for an agreement to act as your proxy. A proxy may have to exercise judgment in the event of a medical decision for which your wishes are not known.  A medical power of  is a legal document that names your health care proxy. Depending on the laws in your state, after the document is written, it may also need to be:  · Signed.  · Notarized.  · Dated.  · Copied.  · Witnessed.  · Incorporated into your medical record.    You may also want to appoint someone to manage your financial affairs in a situation in which you are unable to do so. This is called a durable power of  for finances. It is a separate legal document from the durable power of  for health care. You may choose the same person or someone different from your health care proxy to act as your agent in financial matters.  If you do not appoint a proxy, or if there is a concern that the proxy is not acting in your best interests, a court-appointed guardian may be designated to act on your behalf.  Living will  A living will is a set of instructions  documenting your wishes about medical care when you cannot express them yourself. Health care providers should keep a copy of your living will in your medical record. You may want to give a copy to family members or friends. To alert caregivers in case of an emergency, you can place a card in your wallet to let them know that you have a living will and where they can find it. A living will is used if you become:  · Terminally ill.  · Incapacitated.  · Unable to communicate or make decisions.    Items to consider in your living will include:  · The use or non-use of life-sustaining equipment, such as dialysis machines and breathing machines (ventilators).  · A DNR or DNAR order, which is the instruction not to use cardiopulmonary resuscitation (CPR) if breathing or heartbeat stops.  · The use or non-use of tube feeding.  · Withholding of food and fluids.  · Comfort (palliative) care when the goal becomes comfort rather than a cure.  · Organ and tissue donation.    A living will does not give instructions for distributing your money and property if you should pass away. It is recommended that you seek the advice of a  when writing a will. Decisions about taxes, beneficiaries, and asset distribution will be legally binding. This process can relieve your family and friends of any concerns surrounding disputes or questions that may come up about the distribution of your assets.  DNR or DNAR  A DNR or DNAR order is a request not to have CPR in the event that your heart stops beating or you stop breathing. If a DNR or DNAR order has not been made and shared, a health care provider will try to help any patient whose heart has stopped or who has stopped breathing. If you plan to have surgery, talk with your health care provider about how your DNR or DNAR order will be followed if problems occur.  Summary  · Advance directives are the legal documents that allow you to make choices ahead of time about your health care  and medical treatment in case you become unable to communicate for yourself.  · The process of discussing and writing advance directives should happen over time. You can change the advance directives, even after you have signed them.  · Advance directives include DNR or DNAR orders, living ortega, and designating an agent as your medical power of .  This information is not intended to replace advice given to you by your health care provider. Make sure you discuss any questions you have with your health care provider.  Document Released: 03/26/2009 Document Revised: 11/06/2017 Document Reviewed: 11/06/2017  [employer] Interactive Patient Education © 2017 [employer]    How to Increase Your Level of Physical Activity  Getting regular physical activity is important for your overall health and well-being. Most people do not get enough exercise. There are easy ways to increase your level of physical activity, even if you have not been very active in the past or you are just starting out.  Why is physical activity important?  Physical activity has many short-term and long-term health benefits. Regular exercise can:  · Help you lose weight or maintain a healthy weight.  · Strengthen your muscles and bones.  · Boost your mood and improve self-esteem.  · Reduce your risk of certain long-term (chronic) diseases, like heart disease, cancer, and diabetes.  · Help you stay capable of walking and moving around (mobile) as you age.  · Prevent accidents, such as falls, as you age.  · Increase life expectancy.    What are the benefits of being physically active on a regular basis?  In addition to improving your physical health, being physically active on most days of the week can help you in ways that you may not expect. Benefits of regular physical activity may include:  · Feeling good about your body.  · Being able to move around more easily and for longer periods of time without getting tired (increased  stamina).  · Finding new sources of fun and enjoyment.  · Meeting new people who share a common interest.  · Being able to fight off illness better (enhanced immunity).  · Being able to sleep better.    What can happen if I am not physically active on a regular basis?  Not getting enough physical activity can lead to an unhealthy lifestyle and future health problems. This can increase your chances of:  · Becoming overweight or obese.  · Becoming sick.  · Developing chronic illnesses, like heart disease or diabetes.  · Having mental health problems, like depression or anxiety.  · Having sleep problems.  · Having trouble walking or getting yourself around (reduced mobility).  · Injuring yourself in a fall as you get older.    What steps can I take to be more physically active?  · Check with your health care provider about how to get started. Ask your health care provider what activities are safe for you.  · Start out slowly. Walking or doing some simple chair exercises is a good place to start, especially if you have not been active before or for a long time.  · Try to find activities that you enjoy. You are more likely to commit to an exercise routine if it does not feel like a chore.  · If you have bone or joint problems, choose low-impact exercises, like walking or swimming.  · Include physical activity in your everyday routine.  · Invite friends or family members to exercise with you. This also will help you commit to your workout plan.  · Set goals that you can work toward.  · Aim for at least 150 minutes of moderate-intensity exercise each week. Examples of moderate-intensity exercise include walking or riding a bike.  Where to find more information:  · Centers for Disease Control and Prevention: www.cdc.gov/physicalactivity/index.html  · [employer] [place] on Fitness, Sports & Nutrition www.fitness.gov/resource-center  · ChooseMyPlate: www.choosemyplate.gov/physical-activity  Contact a health care provider  if:  · You have headaches, muscle aches, or joint pain.  · You feel dizzy or light-headed while exercising.  · You faint.  · You have chest pain while exercising.  Summary  · Exercise benefits your mind and body at any age, even if you are just starting out.  · If you have a chronic illness or have not been active for a while, check with your health care provider before increasing your physical activity.  · Choose activities that are safe and enjoyable for you. Ask your health care provider what activities are safe for you.  · Start slowly. Tell your health care provider if you have problems as you start to increase your activity level.  This information is not intended to replace advice given to you by your health care provider. Make sure you discuss any questions you have with your health care provider.  Document Released: 12/07/2017 Document Revised: 12/07/2017 Document Reviewed: 12/07/2017  [employer] Interactive Patient Education © 2018 [employer]    Calorie Counting for Weight Loss  Calories are units of energy. Your body needs a certain amount of calories from food to keep you going throughout the day. When you eat more calories than your body needs, your body stores the extra calories as fat. When you eat fewer calories than your body needs, your body burns fat to get the energy it needs.  Calorie counting means keeping track of how many calories you eat and drink each day. Calorie counting can be helpful if you need to lose weight. If you make sure to eat fewer calories than your body needs, you should lose weight. Ask your health care provider what a healthy weight is for you.  For calorie counting to work, you will need to eat the right number of calories in a day in order to lose a healthy amount of weight per week. A dietitian can help you determine how many calories you need in a day and will give you suggestions on how to reach your calorie goal.  · A healthy amount of weight to lose per week is  usually 1-2 lb (0.5-0.9 kg). This usually means that your daily calorie intake should be reduced by 500-750 calories.  · Eating 1,200 - 1,500 calories per day can help most women lose weight.  · Eating 1,500 - 1,800 calories per day can help most men lose weight.    What do I need to know about calorie counting?  In order to meet your daily calorie goal, you will need to:  · Find out how many calories are in each food you would like to eat. Try to do this before you eat.  · Decide how much of the food you plan to eat.  · Write down what you ate and how many calories it had. Doing this is called keeping a food log.    To successfully lose weight, it is important to balance calorie counting with a healthy lifestyle that includes regular activity. Aim for 150 minutes of moderate exercise (such as walking) or 75 minutes of vigorous exercise (such as running) each week.  Where do I find calorie information?    The number of calories in a food can be found on a Nutrition Facts label. If a food does not have a Nutrition Facts label, try to look up the calories online or ask your dietitian for help.  Remember that calories are listed per serving. If you choose to have more than one serving of a food, you will have to multiply the calories per serving by the amount of servings you plan to eat. For example, the label on a package of bread might say that a serving size is 1 slice and that there are 90 calories in a serving. If you eat 1 slice, you will have eaten 90 calories. If you eat 2 slices, you will have eaten 180 calories.  How do I keep a food log?  Immediately after each meal, record the following information in your food log:  · What you ate. Don't forget to include toppings, sauces, and other extras on the food.  · How much you ate. This can be measured in cups, ounces, or number of items.  · How many calories each food and drink had.  · The total number of calories in the meal.    Keep your food log near you, such  "as in a small notebook in your pocket, or use a mobile lisa or website. Some programs will calculate calories for you and show you how many calories you have left for the day to meet your goal.  What are some calorie counting tips?  · Use your calories on foods and drinks that will fill you up and not leave you hungry:  ? Some examples of foods that fill you up are nuts and nut butters, vegetables, lean proteins, and high-fiber foods like whole grains. High-fiber foods are foods with more than 5 g fiber per serving.  ? Drinks such as sodas, specialty coffee drinks, alcohol, and juices have a lot of calories, yet do not fill you up.  · Eat nutritious foods and avoid empty calories. Empty calories are calories you get from foods or beverages that do not have many vitamins or protein, such as candy, sweets, and soda. It is better to have a nutritious high-calorie food (such as an avocado) than a food with few nutrients (such as a bag of chips).  · Know how many calories are in the foods you eat most often. This will help you calculate calorie counts faster.  · Pay attention to calories in drinks. Low-calorie drinks include water and unsweetened drinks.  · Pay attention to nutrition labels for \"low fat\" or \"fat free\" foods. These foods sometimes have the same amount of calories or more calories than the full fat versions. They also often have added sugar, starch, or salt, to make up for flavor that was removed with the fat.  · Find a way of tracking calories that works for you. Get creative. Try different apps or programs if writing down calories does not work for you.  What are some portion control tips?  · Know how many calories are in a serving. This will help you know how many servings of a certain food you can have.  · Use a measuring cup to measure serving sizes. You could also try weighing out portions on a kitchen scale. With time, you will be able to estimate serving sizes for some foods.  · Take some time to " put servings of different foods on your favorite plates, bowls, and cups so you know what a serving looks like.  · Try not to eat straight from a bag or box. Doing this can lead to overeating. Put the amount you would like to eat in a cup or on a plate to make sure you are eating the right portion.  · Use smaller plates, glasses, and bowls to prevent overeating.  · Try not to multitask (for example, watch TV or use your computer) while eating. If it is time to eat, sit down at a table and enjoy your food. This will help you to know when you are full. It will also help you to be aware of what you are eating and how much you are eating.  What are tips for following this plan?  Reading food labels  · Check the calorie count compared to the serving size. The serving size may be smaller than what you are used to eating.  · Check the source of the calories. Make sure the food you are eating is high in vitamins and protein and low in saturated and trans fats.  Shopping  · Read nutrition labels while you shop. This will help you make healthy decisions before you decide to purchase your food.  · Make a grocery list and stick to it.  Cooking  · Try to cook your favorite foods in a healthier way. For example, try baking instead of frying.  · Use low-fat dairy products.  Meal planning  · Use more fruits and vegetables. Half of your plate should be fruits and vegetables.  · Include lean proteins like poultry and fish.  How do I count calories when eating out?  · Ask for smaller portion sizes.  · Consider sharing an entree and sides instead of getting your own entree.  · If you get your own entree, eat only half. Ask for a box at the beginning of your meal and put the rest of your entree in it so you are not tempted to eat it.  · If calories are listed on the menu, choose the lower calorie options.  · Choose dishes that include vegetables, fruits, whole grains, low-fat dairy products, and lean protein.  · Choose items that are  boiled, broiled, grilled, or steamed. Stay away from items that are buttered, battered, fried, or served with cream sauce. Items labeled “crispy” are usually fried, unless stated otherwise.  · Choose water, low-fat milk, unsweetened iced tea, or other drinks without added sugar. If you want an alcoholic beverage, choose a lower calorie option such as a glass of wine or light beer.  · Ask for dressings, sauces, and syrups on the side. These are usually high in calories, so you should limit the amount you eat.  · If you want a salad, choose a garden salad and ask for grilled meats. Avoid extra toppings like mccray, cheese, or fried items. Ask for the dressing on the side, or ask for olive oil and vinegar or lemon to use as dressing.  · Estimate how many servings of a food you are given. For example, a serving of cooked rice is ½ cup or about the size of half a baseball. Knowing serving sizes will help you be aware of how much food you are eating at restaurants. The list below tells you how big or small some common portion sizes are based on everyday objects:  ? 1 oz--4 stacked dice.  ? 3 oz--1 deck of cards.  ? 1 tsp--1 die.  ? 1 Tbsp--½ a ping-pong ball.  ? 2 Tbsp--1 ping-pong ball.  ? ½ cup--½ baseball.  ? 1 cup--1 baseball.  Summary  · Calorie counting means keeping track of how many calories you eat and drink each day. If you eat fewer calories than your body needs, you should lose weight.  · A healthy amount of weight to lose per week is usually 1-2 lb (0.5-0.9 kg). This usually means reducing your daily calorie intake by 500-750 calories.  · The number of calories in a food can be found on a Nutrition Facts label. If a food does not have a Nutrition Facts label, try to look up the calories online or ask your dietitian for help.  · Use your calories on foods and drinks that will fill you up, and not on foods and drinks that will leave you hungry.  · Use smaller plates, glasses, and bowls to prevent  overeating.  This information is not intended to replace advice given to you by your health care provider. Make sure you discuss any questions you have with your health care provider.  Document Released: 12/18/2006 Document Revised: 11/17/2017 Document Reviewed: 11/17/2017  [employer] Interactive Patient Education © 2018 [employer]

## 2019-01-03 NOTE — PROGRESS NOTES
QUICK REFERENCE INFORMATION:  The ABCs of the Annual Wellness Visit    Subsequent Medicare Wellness Visit    HEALTH RISK ASSESSMENT    1940    Recent Hospitalizations:  No hospitalization(s) within the last year..        Current Medical Providers:  Patient Care Team:  Jose Stevens DO as PCP - General  Michael Pemberton MD as Consulting Physician (Cardiology)  Iram Polanco MD as Consulting Physician (Dermatology)  Mickey Fortune MD as Consulting Physician (Ophthalmology)  Kris Barney MD (Pulmonary Disease)  Lily Ureña MD (Retina Ophthalmology)  Ric Rios MD as Consulting Physician (Endocrinology)        Smoking Status:  Social History     Tobacco Use   Smoking Status Former Smoker   • Packs/day: 1.50   • Years: 50.00   • Pack years: 75.00   Smokeless Tobacco Never Used       Alcohol Consumption:  Social History     Substance and Sexual Activity   Alcohol Use No       Depression Screen:   No flowsheet data found.    Health Habits and Functional and Cognitive Screening:  No flowsheet data found.        Does the patient have evidence of cognitive impairment? No    Aspirin use counseling: Taking ASA appropriately as indicated      Recent Lab Results:  CMP:  Lab Results   Component Value Date    GLU 44 (C) 05/03/2018    BUN 14 05/03/2018    CREATININE 0.94 05/03/2018    EGFRIFNONA 78 05/03/2018    EGFRIFAFRI 95 05/03/2018    BCR 14.9 05/03/2018     05/03/2018    K 4.4 05/03/2018    CO2 26.6 05/03/2018    CALCIUM 9.4 05/03/2018    PROTENTOTREF 7.0 05/03/2018    ALBUMIN 4.30 05/03/2018    LABGLOBREF 2.7 05/03/2018    LABIL2 1.6 05/03/2018    BILITOT 0.6 05/03/2018    ALKPHOS 65 05/03/2018    AST 22 05/03/2018    ALT 21 05/03/2018     Lipid Panel:  Lab Results   Component Value Date    TRIG 107 05/03/2018    HDL 41 05/03/2018    VLDL 21.4 05/03/2018     HbA1c:  Lab Results   Component Value Date    HGBA1C 6.90 (H) 05/03/2018       Visual Acuity:  No exam data  present    Age-appropriate Screening Schedule:  Refer to the list below for future screening recommendations based on patient's age, sex and/or medical conditions. Orders for these recommended tests are listed in the plan section. The patient has been provided with a written plan.    Health Maintenance   Topic Date Due   • PNEUMOCOCCAL VACCINES (65+ LOW/MEDIUM RISK) (2 of 2 - PPSV23) 01/05/2016   • ZOSTER VACCINE (2 of 2) 03/02/2017   • HEMOGLOBIN A1C  11/03/2018   • LIPID PANEL  05/03/2019   • URINE MICROALBUMIN  05/03/2019   • COLONOSCOPY  01/04/2028   • TDAP/TD VACCINES (2 - Td) 03/03/2028   • INFLUENZA VACCINE  Completed        Subjective   History of Present Illness    Bunny Chacon is a 78 y.o. male who presents for an Subsequent Wellness Visit.  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  Patient dm2, htn and hld - mgmt per Dr. Rios.        The following portions of the patient's history were reviewed and updated as appropriate: allergies, current medications, past family history, past medical history, past social history, past surgical history and problem list.    Outpatient Medications Prior to Visit   Medication Sig Dispense Refill   • aspirin 81 MG tablet Take 81 mg by mouth daily.     • enalapril (VASOTEC) 2.5 MG tablet Take 1 tablet by mouth Daily. 90 tablet 3   • furosemide (LASIX) 40 MG tablet Take 1 tablet by mouth Daily. 90 tablet 3   • glucose blood test strip Testing bs 5 x day  Dx code E11.8 500 each 1   • insulin NPH (humuLIN N,novoLIN N) 100 UNIT/ML injection 44   UNITS BREAKFAST and  6 UNITS HS. use Relion brand 40 mL 2   • insulin NPH (NOVOLIN N RELION) 100 UNIT/ML injection 44 units at breakfast and 6 units at night 40 mL 2   • Insulin Pen Needle ([employer] PEN NEEDLES) 31G X 6 MM misc USE AS DIRECTED.     • insulin regular (NOVOLIN R RELION) 100 UNIT/ML injection 5 units at breakfast and 11 units at dinner 30 mL 2   • metoprolol tartrate (LOPRESSOR) 50 MG  tablet Take 1 tablet by mouth Every 12 (Twelve) Hours. 180 tablet 3   • Multiple Vitamin (MULTI VITAMIN DAILY) tablet Take 1 tablet by mouth daily.     • multivitamin-minerals (OCUVITE) tablet Take 1 tablet by mouth 2 (two) times a day.     • Omega-3 Fatty Acids (GNP FISH OIL) 1000 MG capsule Take 1 capsule by mouth daily.     • ONETOUCH DELICA LANCETS FINE misc      • simvastatin (ZOCOR) 80 MG tablet Take 1 tablet by mouth Daily. 90 tablet 3   • warfarin (COUMADIN) 7.5 MG tablet Take 1 tablet by mouth Daily. 1 1/2 tablet tues & thur 1 tablet the rest of the week       No facility-administered medications prior to visit.        Patient Active Problem List   Diagnosis   • Type 2 diabetes mellitus with complication, with long-term current use of insulin (CMS/[facility])   • Hyperlipidemia   • Essential hypertension   • CAD (coronary artery disease)   • Sleep apnea   • Paroxysmal atrial fibrillation (CMS/[facility])   • Macular degeneration   • History of atrial fibrillation   • ASCVD (arteriosclerotic cardiovascular disease)   • Diabetic eye exam (CMS/[facility])   • Long term current use of anticoagulant       Advance Care Planning:  power of  for healthcare on file, has NO advance directive - information provided to the patient today    Identification of Risk Factors:  Risk factors include: weight .    Review of Systems   Eyes: Positive for visual disturbance (macular degeneration).   Respiratory: Negative for shortness of breath.    Cardiovascular: Negative for chest pain and palpitations.       Compared to one year ago, the patient feels his physical health is the same.  Compared to one year ago, the patient feels his mental health is the same.    Objective     Physical Exam   Constitutional: He appears well-developed and well-nourished.   HENT:   Head: Normocephalic and atraumatic.   Neck: Neck supple. No JVD present. No thyromegaly present.   Cardiovascular: Normal rate, regular rhythm and normal heart sounds. Exam reveals  no gallop and no friction rub.   No murmur heard.  Pulmonary/Chest: Effort normal and breath sounds normal. No respiratory distress. He has no wheezes. He has no rales.   Abdominal: Soft. Bowel sounds are normal. He exhibits no distension. There is no tenderness. There is no rebound and no guarding.   Musculoskeletal: He exhibits no edema.   Neurological: He is alert.   Skin: Skin is warm and dry.   Psychiatric: He has a normal mood and affect. His behavior is normal.   Nursing note and vitals reviewed.      Vitals:    01/03/19 1346   BP: 110/66   Pulse: 65   Temp: 97.7 °F (36.5 °C)   SpO2: 96%   Weight: 100 kg (221 lb)       Patient's Body mass index is 36.78 kg/m². BMI is above normal parameters. Recommendations include: educational material.      Assessment/Plan   Patient Self-Management and Personalized Health Advice  The patient has been provided with information about: diet and exercise and preventive services including:   · Advance directive.    Visit Diagnoses:  No diagnosis found.    No orders of the defined types were placed in this encounter.      Outpatient Encounter Medications as of 1/3/2019   Medication Sig Dispense Refill   • aspirin 81 MG tablet Take 81 mg by mouth daily.     • enalapril (VASOTEC) 2.5 MG tablet Take 1 tablet by mouth Daily. 90 tablet 3   • furosemide (LASIX) 40 MG tablet Take 1 tablet by mouth Daily. 90 tablet 3   • glucose blood test strip Testing bs 5 x day  Dx code E11.8 500 each 1   • insulin NPH (humuLIN N,novoLIN N) 100 UNIT/ML injection 44   UNITS BREAKFAST and  6 UNITS HS. use Relion brand 40 mL 2   • insulin NPH (NOVOLIN N RELION) 100 UNIT/ML injection 44 units at breakfast and 6 units at night 40 mL 2   • Insulin Pen Needle ([employer] PEN NEEDLES) 31G X 6 MM misc USE AS DIRECTED.     • insulin regular (NOVOLIN R RELION) 100 UNIT/ML injection 5 units at breakfast and 11 units at dinner 30 mL 2   • metoprolol tartrate (LOPRESSOR) 50 MG tablet Take 1 tablet by mouth Every 12  (Twelve) Hours. 180 tablet 3   • Multiple Vitamin (MULTI VITAMIN DAILY) tablet Take 1 tablet by mouth daily.     • multivitamin-minerals (OCUVITE) tablet Take 1 tablet by mouth 2 (two) times a day.     • Omega-3 Fatty Acids (GNP FISH OIL) 1000 MG capsule Take 1 capsule by mouth daily.     • ONETOUCH DELICA LANCETS FINE misc      • simvastatin (ZOCOR) 80 MG tablet Take 1 tablet by mouth Daily. 90 tablet 3   • warfarin (COUMADIN) 7.5 MG tablet Take 1 tablet by mouth Daily. 1 1/2 tablet tues & thur 1 tablet the rest of the week       No facility-administered encounter medications on file as of 1/3/2019.        Reviewed use of high risk medication in the elderly: yes  Reviewed for potential of harmful drug interactions in the elderly: yes    Follow Up:  Return in about 1 year (around 1/3/2020), or if symptoms worsen or fail to improve.     An After Visit Summary and PPPS with all of these plans were given to the patient.      Reports had pneumococcal vaccines at endocrinology.

## 2019-01-09 DIAGNOSIS — I10 ESSENTIAL HYPERTENSION: ICD-10-CM

## 2019-01-09 DIAGNOSIS — I25.10 CORONARY ARTERIOSCLEROSIS IN NATIVE ARTERY: ICD-10-CM

## 2019-01-09 DIAGNOSIS — I48.0 PAROXYSMAL ATRIAL FIBRILLATION (HCC): ICD-10-CM

## 2019-01-09 DIAGNOSIS — Z79.4 TYPE 2 DIABETES MELLITUS WITH COMPLICATION, WITH LONG-TERM CURRENT USE OF INSULIN (HCC): ICD-10-CM

## 2019-01-09 DIAGNOSIS — E11.8 TYPE 2 DIABETES MELLITUS WITH COMPLICATION, WITH LONG-TERM CURRENT USE OF INSULIN (HCC): ICD-10-CM

## 2019-01-09 RX ORDER — SIMVASTATIN 80 MG
80 TABLET ORAL DAILY
Qty: 90 TABLET | Refills: 3 | Status: SHIPPED | OUTPATIENT
Start: 2019-01-09 | End: 2020-01-07 | Stop reason: SDUPTHER

## 2019-01-09 RX ORDER — METOPROLOL TARTRATE 50 MG/1
50 TABLET, FILM COATED ORAL EVERY 12 HOURS SCHEDULED
Qty: 180 TABLET | Refills: 3 | Status: SHIPPED | OUTPATIENT
Start: 2019-01-09 | End: 2020-01-07 | Stop reason: SDUPTHER

## 2019-01-09 RX ORDER — ENALAPRIL MALEATE 2.5 MG/1
2.5 TABLET ORAL DAILY
Qty: 90 TABLET | Refills: 3 | Status: SHIPPED | OUTPATIENT
Start: 2019-01-09 | End: 2020-01-07 | Stop reason: SDUPTHER

## 2019-01-09 RX ORDER — FUROSEMIDE 40 MG/1
40 TABLET ORAL DAILY
Qty: 90 TABLET | Refills: 3 | Status: SHIPPED | OUTPATIENT
Start: 2019-01-09 | End: 2020-01-07 | Stop reason: SDUPTHER

## 2019-01-14 ENCOUNTER — OFFICE VISIT (OUTPATIENT)
Dept: ENDOCRINOLOGY | Age: 79
End: 2019-01-14

## 2019-01-14 VITALS
HEART RATE: 72 BPM | DIASTOLIC BLOOD PRESSURE: 80 MMHG | HEIGHT: 65 IN | SYSTOLIC BLOOD PRESSURE: 148 MMHG | WEIGHT: 221.6 LBS | OXYGEN SATURATION: 93 % | BODY MASS INDEX: 36.92 KG/M2

## 2019-01-14 DIAGNOSIS — G47.30 SLEEP APNEA, UNSPECIFIED TYPE: ICD-10-CM

## 2019-01-14 DIAGNOSIS — I10 ESSENTIAL HYPERTENSION: ICD-10-CM

## 2019-01-14 DIAGNOSIS — H35.30 MACULAR DEGENERATION, UNSPECIFIED LATERALITY, UNSPECIFIED TYPE: Primary | ICD-10-CM

## 2019-01-14 DIAGNOSIS — E78.5 HYPERLIPIDEMIA, UNSPECIFIED HYPERLIPIDEMIA TYPE: ICD-10-CM

## 2019-01-14 DIAGNOSIS — I25.10 CORONARY ARTERY DISEASE INVOLVING NATIVE CORONARY ARTERY OF NATIVE HEART WITHOUT ANGINA PECTORIS: ICD-10-CM

## 2019-01-14 DIAGNOSIS — Z79.4 TYPE 2 DIABETES MELLITUS WITH COMPLICATION, WITH LONG-TERM CURRENT USE OF INSULIN (HCC): ICD-10-CM

## 2019-01-14 DIAGNOSIS — E11.8 TYPE 2 DIABETES MELLITUS WITH COMPLICATION, WITH LONG-TERM CURRENT USE OF INSULIN (HCC): ICD-10-CM

## 2019-01-14 PROCEDURE — 99214 OFFICE O/P EST MOD 30 MIN: CPT | Performed by: INTERNAL MEDICINE

## 2019-01-14 NOTE — PROGRESS NOTES
Subjective   Bunny Chacon is a 78 y.o. male.     F/u for dm 2, hyperlipidemia, hypertension, sleep apnea/ testing bs 5 x day / las td eye exam 12/27/18 with dr Ureña / last dm foot exam today with  Dr Payne / flu vaccine @ [facility]       Diabetes   He presents for his follow-up diabetic visit. He has type 2 diabetes mellitus.   Hyperlipidemia     Hypertension   Identifiable causes of hypertension include sleep apnea.   Sleep Apnea        Patient is a 78-year-old male came in for follow-up.     He has type 2 diabetes mellitus and has been on Novolin N 44 and R 5 every AM and R 11 at supper and N 6 q hs. FBS . ac lunch 108-201. ac supper . hs 109-187.  He was switched from analog insulin to human insulin to reduce drug costs. He denies severe hypoglycemic episodes. Last meal at 10 AM      His last eye examination was in 12/18. He has macular degeneration and had bilateral intraocular injection on eye.   He has blurry vision on his right eye.  He had previous bilateral cataract surgery . He has no microalbuminuria on urine sample taken last 5/18. He is on enalapril. He denies any paresthesias.      He has hyperlipidemia and has been on Zocor 80 mg once a day,  and fish oil 1000 mg once a day. He denies any myalgia. He has gained 4 pounds since 5/18.      He has hypertension and has been on chronic Coumadin therapy for intermittent atrial fibrillation. ProTime is being monitored by Dr. Stevens. Denies any bleeding. He denies any chest pain, palpitations, or shortness of breath.      He has sleep apnea and uses his CPAP regularly. He wakes up rested. He is no longer snoring.  He follows to Dr. Barney.    The following portions of the patient's history were reviewed and updated as appropriate: allergies, current medications, past family history, past medical history, past social history, past surgical history and problem list.    Review of Systems   Constitutional: Negative.    HENT: Negative.    Eyes:  "Negative.    Respiratory: Negative.    Cardiovascular: Negative.    Gastrointestinal: Negative.    Endocrine: Negative.    Genitourinary: Negative.    Musculoskeletal: Negative.    Skin: Negative.    Allergic/Immunologic: Negative.    Neurological: Negative.    Hematological: Bruises/bleeds easily (on warfarin ).   Psychiatric/Behavioral: Positive for sleep disturbance (sleep apnea using CPAP machine ).       Objective      Vitals:    01/14/19 1301   BP: 148/80   BP Location: Right arm   Patient Position: Sitting   Cuff Size: Large Adult   Pulse: 72   SpO2: 93%   Weight: 101 kg (221 lb 9.6 oz)   Height: 165.1 cm (65\")     Physical Exam   Constitutional: He is oriented to person, place, and time. He appears well-developed and well-nourished. No distress.   HENT:   Head: Normocephalic.   Nose: Nose normal.   Mouth/Throat: No oropharyngeal exudate.   Eyes: Conjunctivae and EOM are normal. Right eye exhibits no discharge. Left eye exhibits no discharge. No scleral icterus.   Neck: Neck supple. No JVD present. No tracheal deviation present. No thyromegaly present.   Cardiovascular: Normal rate, regular rhythm, normal heart sounds and intact distal pulses. Exam reveals no gallop and no friction rub.   No murmur heard.  Pulmonary/Chest: Effort normal and breath sounds normal. No stridor. No respiratory distress. He has no wheezes. He has no rales.   Abdominal: Soft. Bowel sounds are normal. He exhibits no distension and no mass. There is no tenderness. There is no rebound and no guarding.   Musculoskeletal: Normal range of motion. He exhibits no edema, tenderness or deformity.   Lymphadenopathy:     He has no cervical adenopathy.   Neurological: He is oriented to person, place, and time. He displays normal reflexes. He exhibits normal muscle tone. Coordination normal.   Skin: Skin is warm and dry. No rash noted. No erythema. No pallor.   Psychiatric: He has a normal mood and affect. His behavior is normal.     Office Visit " on 05/03/2018   Component Date Value Ref Range Status   • Glucose 05/03/2018 44* 65 - 99 mg/dL Final   • BUN 05/03/2018 14  8 - 23 mg/dL Final   • Creatinine 05/03/2018 0.94  0.76 - 1.27 mg/dL Final   • eGFR Non African Am 05/03/2018 78  >60 mL/min/1.73 Final    Comment: The MDRD GFR formula is only valid for adults with stable  renal function between ages 18 and 70.     • eGFR  Am 05/03/2018 95  >60 mL/min/1.73 Final   • BUN/Creatinine Ratio 05/03/2018 14.9  7.0 - 25.0 Final   • Sodium 05/03/2018 143  136 - 145 mmol/L Final   • Potassium 05/03/2018 4.4  3.5 - 5.2 mmol/L Final   • Chloride 05/03/2018 103  98 - 107 mmol/L Final   • Total CO2 05/03/2018 26.6  22.0 - 29.0 mmol/L Final   • Calcium 05/03/2018 9.4  8.6 - 10.5 mg/dL Final   • Total Protein 05/03/2018 7.0  6.0 - 8.5 g/dL Final   • Albumin 05/03/2018 4.30  3.50 - 5.20 g/dL Final   • Globulin 05/03/2018 2.7  gm/dL Final   • A/G Ratio 05/03/2018 1.6  g/dL Final   • Total Bilirubin 05/03/2018 0.6  0.1 - 1.2 mg/dL Final   • Alkaline Phosphatase 05/03/2018 65  39 - 117 U/L Final   • AST (SGOT) 05/03/2018 22  1 - 40 U/L Final   • ALT (SGPT) 05/03/2018 21  1 - 41 U/L Final   • Total Cholesterol 05/03/2018 129  0 - 200 mg/dL Final   • Triglycerides 05/03/2018 107  0 - 150 mg/dL Final   • HDL Cholesterol 05/03/2018 41  40 - 60 mg/dL Final   • VLDL Cholesterol 05/03/2018 21.4  5 - 40 mg/dL Final   • LDL Cholesterol  05/03/2018 67  0 - 100 mg/dL Final   • Hemoglobin A1C 05/03/2018 6.90* 4.80 - 5.60 % Final    Comment: Hemoglobin A1C Ranges:  Increased Risk for Diabetes  5.7% to 6.4%  Diabetes                     >= 6.5%  Diabetic Goal                < 7.0%     • TSH 05/03/2018 0.960  0.270 - 4.200 mIU/mL Final   • Free T4 05/03/2018 1.30  0.93 - 1.70 ng/dL Final   • Creatinine, Urine 05/03/2018 16.2  Not Estab. mg/dL Final   • Microalbumin, Urine 05/03/2018 <3.0  Not Estab. ug/mL Final   • Microalbumin/Creatinine Ratio 05/03/2018 <18.5  0.0 - 30.0 mg/g creat  Final   • Interpretation 05/03/2018 Note   Final    Supplemental report is available.   • PDF Image 05/03/2018 Not applicable   Final     Assessment/Plan   Bunny was seen today for diabetes, hyperlipidemia, hypertension and sleep apnea.    Diagnoses and all orders for this visit:    Macular degeneration, unspecified laterality, unspecified type    Type 2 diabetes mellitus with complication, with long-term current use of insulin (CMS/[facility])  -     insulin regular (NOVOLIN R RELION) 100 UNIT/ML injection; 5 units at breakfast and 11 units at dinner  -     Comprehensive Metabolic Panel  -     Hemoglobin A1c    Hyperlipidemia, unspecified hyperlipidemia type  -     Comprehensive Metabolic Panel  -     Lipid Panel  -     TSH  -     T4, Free    Essential hypertension  -     Comprehensive Metabolic Panel    Coronary artery disease involving native coronary artery of native heart without angina pectoris  -     Comprehensive Metabolic Panel    Sleep apnea, unspecified type  -     Comprehensive Metabolic Panel  -     TSH  -     T4, Free    Other orders  -     glucose blood test strip; Testing bs 5 x day  Dx code E11.8  -     insulin NPH (NOVOLIN N RELION) 100 UNIT/ML injection; 44 units at breakfast and 6 units at night      Continue Novolin N and Novolin R  Discussed Freestyle farzad and printed information was given to the patient.  Continue Zocor and fish oil   Continue CPAP.    Send copy of my note to Dr. Stevens.    RTC 4 mos.

## 2019-01-15 LAB
ALBUMIN SERPL-MCNC: 4.2 G/DL (ref 3.5–5.2)
ALBUMIN/GLOB SERPL: 1.5 G/DL
ALP SERPL-CCNC: 71 U/L (ref 39–117)
ALT SERPL-CCNC: 24 U/L (ref 1–41)
AST SERPL-CCNC: 18 U/L (ref 1–40)
BILIRUB SERPL-MCNC: 0.6 MG/DL (ref 0.1–1.2)
BUN SERPL-MCNC: 17 MG/DL (ref 8–23)
BUN/CREAT SERPL: 18.5 (ref 7–25)
CALCIUM SERPL-MCNC: 9.3 MG/DL (ref 8.6–10.5)
CHLORIDE SERPL-SCNC: 106 MMOL/L (ref 98–107)
CHOLEST SERPL-MCNC: 112 MG/DL (ref 0–200)
CO2 SERPL-SCNC: 35.2 MMOL/L (ref 22–29)
CREAT SERPL-MCNC: 0.92 MG/DL (ref 0.76–1.27)
GLOBULIN SER CALC-MCNC: 2.8 GM/DL
GLUCOSE SERPL-MCNC: 209 MG/DL (ref 65–99)
HBA1C MFR BLD: 7.9 % (ref 4.8–5.6)
HDLC SERPL-MCNC: 35 MG/DL (ref 40–60)
INTERPRETATION: NORMAL
LDLC SERPL CALC-MCNC: 49 MG/DL (ref 0–100)
Lab: NORMAL
POTASSIUM SERPL-SCNC: 4.9 MMOL/L (ref 3.5–5.2)
PROT SERPL-MCNC: 7 G/DL (ref 6–8.5)
SODIUM SERPL-SCNC: 143 MMOL/L (ref 136–145)
T4 FREE SERPL-MCNC: 1.19 NG/DL (ref 0.93–1.7)
TRIGL SERPL-MCNC: 141 MG/DL (ref 0–150)
TSH SERPL DL<=0.005 MIU/L-ACNC: 1.54 MIU/ML (ref 0.27–4.2)
VLDLC SERPL CALC-MCNC: 28.2 MG/DL (ref 5–40)

## 2019-05-21 NOTE — PROGRESS NOTES
Subjective   Bunny Chacon is a 78 y.o. male.     F/u for dm 2, hyperlipidemia, hypertension, sleep apnea / testing bs 5 x day/ last dm eye exam May 2019  with dr Ureña / last dm foot exam 1/14/19 with dr Rios        Patient is a 78-year-old male came in for follow-up.      He has type 2 diabetes mellitus and has been on Novolin N 44 and R 5 every AM and R 11 at supper and N 6 q hs. FBS 129-197. ac lunch . ac supper 133-190. hs 130-188.  He was switched from analog insulin to human insulin to reduce drug costs. He denies severe hypoglycemic episodes. Last meal at 11 AM      His last eye examination was in 5/19. He has macular degeneration and had intraocular injection on left eye.   He has blurry vision on his right eye.  He had previous bilateral cataract surgery . He has no microalbuminuria on urine sample taken last 5/18. He is on enalapril. He denies any paresthesias.      He has hyperlipidemia and has been on Zocor 80 mg once a day,  and fish oil 1000 mg once a day. He denies any myalgia. He has no significant weight change since 1/19      He has hypertension and CAD.  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 He has been on chronic Coumadin therapy for intermittent atrial fibrillation. ProTime is being monitored by Dr. Stevens. Denies any bleeding. He denies any chest pain, palpitations, or shortness of breath.      He has sleep apnea and uses his CPAP regularly. He wakes up rested. He is no longer snoring.  He follows to Dr. Barney.    The following portions of the patient's history were reviewed and updated as appropriate: allergies, current medications, past family history, past medical history, past social history, past surgical history and problem list.    Review of Systems   Constitutional: Negative.    HENT: Negative.    Eyes: Negative.    Respiratory: Negative.    Cardiovascular: Negative.    Gastrointestinal: Negative.    Endocrine: Negative.    Genitourinary: Positive for frequency.  "  Musculoskeletal: Positive for back pain.   Skin: Negative.    Allergic/Immunologic: Negative.    Neurological: Negative.    Hematological: Bruises/bleeds easily (  on warfarin ).   Psychiatric/Behavioral: Positive for sleep disturbance (sleep apnea using CPAP machine ).       Objective      Vitals:    05/22/19 1407   BP: 132/66   BP Location: Right arm   Patient Position: Sitting   Cuff Size: Large Adult   Pulse: 76   SpO2: 95%   Weight: 100 kg (220 lb 12.8 oz)   Height: 165.1 cm (65\")     Physical Exam   Constitutional: He is oriented to person, place, and time. He appears well-developed and well-nourished. No distress.   HENT:   Head: Normocephalic.   Nose: Nose normal.   Mouth/Throat: No oropharyngeal exudate.   Eyes: Conjunctivae and EOM are normal. Right eye exhibits no discharge. Left eye exhibits no discharge. No scleral icterus.   Neck: Neck supple. No JVD present. No tracheal deviation present. No thyromegaly present.   Cardiovascular: Normal rate, regular rhythm, normal heart sounds and intact distal pulses. Exam reveals no gallop and no friction rub.   No murmur heard.  Pulmonary/Chest: Effort normal and breath sounds normal. No stridor. No respiratory distress. He has no wheezes. He has no rales. He exhibits no tenderness.   Abdominal: Soft. Bowel sounds are normal. He exhibits no distension and no mass. There is no tenderness. There is no rebound and no guarding.   Musculoskeletal: Normal range of motion. He exhibits no edema, tenderness or deformity.   Lymphadenopathy:     He has no cervical adenopathy.   Neurological: He is alert and oriented to person, place, and time. He displays normal reflexes.   Intact light touch in distal lower ext   Skin: Skin is warm and dry. No rash noted. No erythema. No pallor.   Psychiatric: He has a normal mood and affect. His behavior is normal.     Office Visit on 01/14/2019   Component Date Value Ref Range Status   • Glucose 01/14/2019 209* 65 - 99 mg/dL Final   • " BUN 01/14/2019 17  8 - 23 mg/dL Final   • Creatinine 01/14/2019 0.92  0.76 - 1.27 mg/dL Final   • eGFR Non African Am 01/14/2019 80  >60 mL/min/1.73 Final    Comment: The MDRD GFR formula is only valid for adults with stable  renal function between ages 18 and 70.     • eGFR  Am 01/14/2019 97  >60 mL/min/1.73 Final   • BUN/Creatinine Ratio 01/14/2019 18.5  7.0 - 25.0 Final   • Sodium 01/14/2019 143  136 - 145 mmol/L Final   • Potassium 01/14/2019 4.9  3.5 - 5.2 mmol/L Final   • Chloride 01/14/2019 106  98 - 107 mmol/L Final   • Total CO2 01/14/2019 35.2* 22.0 - 29.0 mmol/L Final   • Calcium 01/14/2019 9.3  8.6 - 10.5 mg/dL Final   • Total Protein 01/14/2019 7.0  6.0 - 8.5 g/dL Final   • Albumin 01/14/2019 4.20  3.50 - 5.20 g/dL Final   • Globulin 01/14/2019 2.8  gm/dL Final   • A/G Ratio 01/14/2019 1.5  g/dL Final   • Total Bilirubin 01/14/2019 0.6  0.1 - 1.2 mg/dL Final   • Alkaline Phosphatase 01/14/2019 71  39 - 117 U/L Final   • AST (SGOT) 01/14/2019 18  1 - 40 U/L Final   • ALT (SGPT) 01/14/2019 24  1 - 41 U/L Final   • Total Cholesterol 01/14/2019 112  0 - 200 mg/dL Final   • Triglycerides 01/14/2019 141  0 - 150 mg/dL Final   • HDL Cholesterol 01/14/2019 35* 40 - 60 mg/dL Final   • VLDL Cholesterol 01/14/2019 28.2  5 - 40 mg/dL Final   • LDL Cholesterol  01/14/2019 49  0 - 100 mg/dL Final   • Hemoglobin A1C 01/14/2019 7.90* 4.80 - 5.60 % Final    Comment: Hemoglobin A1C Ranges:  Increased Risk for Diabetes  5.7% to 6.4%  Diabetes                     >= 6.5%  Diabetic Goal                < 7.0%     • TSH 01/14/2019 1.540  0.270 - 4.200 mIU/mL Final   • Free T4 01/14/2019 1.19  0.93 - 1.70 ng/dL Final   • Interpretation 01/14/2019 Note   Final    Supplemental report is available.   • PDF Image 01/14/2019 Not applicable   Final     Assessment/Plan   Bunny was seen today for diabetes, hyperlipidemia, hypertension and sleep apnea.    Diagnoses and all orders for this visit:    Type 2 diabetes mellitus with  complication, with long-term current use of insulin (CMS/[facility])  -     Comprehensive Metabolic Panel  -     Hemoglobin A1c  -     Microalbumin / Creatinine Urine Ratio - Urine, Clean Catch  -     Discontinue: insulin regular (NOVOLIN R RELION) 100 UNIT/ML injection; 5 units at breakfast and 11 units at dinner  -     insulin regular (NOVOLIN R RELION) 100 UNIT/ML injection; 5 units at breakfast and 11 units at dinner    Hyperlipidemia, unspecified hyperlipidemia type  -     Comprehensive Metabolic Panel  -     Lipid Panel  -     TSH    Essential hypertension  -     Comprehensive Metabolic Panel    Coronary artery disease involving native coronary artery of native heart without angina pectoris  -     Comprehensive Metabolic Panel    Paroxysmal atrial fibrillation (CMS/HCC)  -     Comprehensive Metabolic Panel  -     TSH    Sleep apnea, unspecified type  -     Comprehensive Metabolic Panel  -     TSH    Long term current use of anticoagulant    Other orders  -     Discontinue: insulin NPH (NOVOLIN N RELION) 100 UNIT/ML injection; 44 units at breakfast and 6 units at night  -     insulin NPH (NOVOLIN N RELION) 100 UNIT/ML injection; 44 units at breakfast and 6 units at night      Continue Relion N and Relion R.  Continue Zocor 80 mg/day and fish oil 1000 mg/day.  Continue CPAP.  Continue enalapril, Lasix, and metoprolol tartrate per Dr. Pemberton  Continue anticoagulation per cardiologist.    Copy of my note sent to Dr. Jose Stevens and Dr. Michael Pemberton and Dr. Adelita MOORE 4 mos.

## 2019-05-22 ENCOUNTER — OFFICE VISIT (OUTPATIENT)
Dept: ENDOCRINOLOGY | Age: 79
End: 2019-05-22

## 2019-05-22 VITALS
DIASTOLIC BLOOD PRESSURE: 66 MMHG | HEART RATE: 76 BPM | HEIGHT: 65 IN | OXYGEN SATURATION: 95 % | SYSTOLIC BLOOD PRESSURE: 132 MMHG | BODY MASS INDEX: 36.79 KG/M2 | WEIGHT: 220.8 LBS

## 2019-05-22 DIAGNOSIS — I25.10 CORONARY ARTERY DISEASE INVOLVING NATIVE CORONARY ARTERY OF NATIVE HEART WITHOUT ANGINA PECTORIS: ICD-10-CM

## 2019-05-22 DIAGNOSIS — I48.0 PAROXYSMAL ATRIAL FIBRILLATION (HCC): ICD-10-CM

## 2019-05-22 DIAGNOSIS — Z79.4 TYPE 2 DIABETES MELLITUS WITH COMPLICATION, WITH LONG-TERM CURRENT USE OF INSULIN (HCC): Primary | ICD-10-CM

## 2019-05-22 DIAGNOSIS — E11.8 TYPE 2 DIABETES MELLITUS WITH COMPLICATION, WITH LONG-TERM CURRENT USE OF INSULIN (HCC): Primary | ICD-10-CM

## 2019-05-22 DIAGNOSIS — E78.5 HYPERLIPIDEMIA, UNSPECIFIED HYPERLIPIDEMIA TYPE: ICD-10-CM

## 2019-05-22 DIAGNOSIS — G47.30 SLEEP APNEA, UNSPECIFIED TYPE: ICD-10-CM

## 2019-05-22 DIAGNOSIS — Z79.01 LONG TERM CURRENT USE OF ANTICOAGULANT: ICD-10-CM

## 2019-05-22 DIAGNOSIS — I10 ESSENTIAL HYPERTENSION: ICD-10-CM

## 2019-05-22 PROCEDURE — 99214 OFFICE O/P EST MOD 30 MIN: CPT | Performed by: INTERNAL MEDICINE

## 2019-05-23 LAB
ALBUMIN SERPL-MCNC: 4.1 G/DL (ref 3.5–5.2)
ALBUMIN/CREAT UR: 16.2 MG/G CREAT (ref 0–30)
ALBUMIN/GLOB SERPL: 1.6 G/DL
ALP SERPL-CCNC: 73 U/L (ref 39–117)
ALT SERPL-CCNC: 24 U/L (ref 1–41)
AST SERPL-CCNC: 21 U/L (ref 1–40)
BILIRUB SERPL-MCNC: 0.7 MG/DL (ref 0.2–1.2)
BUN SERPL-MCNC: 16 MG/DL (ref 8–23)
BUN/CREAT SERPL: 14.8 (ref 7–25)
CALCIUM SERPL-MCNC: 9.7 MG/DL (ref 8.6–10.5)
CHLORIDE SERPL-SCNC: 102 MMOL/L (ref 98–107)
CHOLEST SERPL-MCNC: 118 MG/DL (ref 0–200)
CO2 SERPL-SCNC: 24.5 MMOL/L (ref 22–29)
CREAT SERPL-MCNC: 1.08 MG/DL (ref 0.76–1.27)
CREAT UR-MCNC: 39.4 MG/DL
GLOBULIN SER CALC-MCNC: 2.5 GM/DL
GLUCOSE SERPL-MCNC: 160 MG/DL (ref 65–99)
HBA1C MFR BLD: 9.3 % (ref 4.8–5.6)
HDLC SERPL-MCNC: 34 MG/DL (ref 40–60)
INTERPRETATION: NORMAL
LDLC SERPL CALC-MCNC: 60 MG/DL (ref 0–100)
Lab: NORMAL
MICROALBUMIN UR-MCNC: 6.4 UG/ML
POTASSIUM SERPL-SCNC: 4.3 MMOL/L (ref 3.5–5.2)
PROT SERPL-MCNC: 6.6 G/DL (ref 6–8.5)
SODIUM SERPL-SCNC: 141 MMOL/L (ref 136–145)
TRIGL SERPL-MCNC: 119 MG/DL (ref 0–150)
TSH SERPL DL<=0.005 MIU/L-ACNC: 1.42 MIU/ML (ref 0.27–4.2)
VLDLC SERPL CALC-MCNC: 23.8 MG/DL

## 2019-10-01 ENCOUNTER — OFFICE VISIT (OUTPATIENT)
Dept: ENDOCRINOLOGY | Age: 79
End: 2019-10-01

## 2019-10-01 VITALS
SYSTOLIC BLOOD PRESSURE: 122 MMHG | HEART RATE: 71 BPM | DIASTOLIC BLOOD PRESSURE: 66 MMHG | WEIGHT: 220.8 LBS | BODY MASS INDEX: 36.79 KG/M2 | OXYGEN SATURATION: 98 % | HEIGHT: 65 IN

## 2019-10-01 DIAGNOSIS — I10 ESSENTIAL HYPERTENSION: ICD-10-CM

## 2019-10-01 DIAGNOSIS — Z86.79 HISTORY OF ATRIAL FIBRILLATION: ICD-10-CM

## 2019-10-01 DIAGNOSIS — E11.8 TYPE 2 DIABETES MELLITUS WITH COMPLICATION, WITH LONG-TERM CURRENT USE OF INSULIN (HCC): Primary | ICD-10-CM

## 2019-10-01 DIAGNOSIS — Z79.4 TYPE 2 DIABETES MELLITUS WITH COMPLICATION, WITH LONG-TERM CURRENT USE OF INSULIN (HCC): Primary | ICD-10-CM

## 2019-10-01 DIAGNOSIS — G47.33 OBSTRUCTIVE SLEEP APNEA SYNDROME: ICD-10-CM

## 2019-10-01 DIAGNOSIS — E78.5 HYPERLIPIDEMIA, UNSPECIFIED HYPERLIPIDEMIA TYPE: ICD-10-CM

## 2019-10-01 PROCEDURE — 99214 OFFICE O/P EST MOD 30 MIN: CPT | Performed by: INTERNAL MEDICINE

## 2019-10-02 LAB
ALBUMIN SERPL-MCNC: 4.5 G/DL (ref 3.5–5.2)
ALBUMIN/GLOB SERPL: 1.7 G/DL
ALP SERPL-CCNC: 85 U/L (ref 39–117)
ALT SERPL-CCNC: 25 U/L (ref 1–41)
AST SERPL-CCNC: 23 U/L (ref 1–40)
BILIRUB SERPL-MCNC: 0.9 MG/DL (ref 0.2–1.2)
BUN SERPL-MCNC: 15 MG/DL (ref 8–23)
BUN/CREAT SERPL: 15.5 (ref 7–25)
CALCIUM SERPL-MCNC: 9.4 MG/DL (ref 8.6–10.5)
CHLORIDE SERPL-SCNC: 101 MMOL/L (ref 98–107)
CHOLEST SERPL-MCNC: 120 MG/DL (ref 0–200)
CO2 SERPL-SCNC: 25.3 MMOL/L (ref 22–29)
CREAT SERPL-MCNC: 0.97 MG/DL (ref 0.76–1.27)
GLOBULIN SER CALC-MCNC: 2.7 GM/DL
GLUCOSE SERPL-MCNC: 237 MG/DL (ref 65–99)
HBA1C MFR BLD: 9.6 % (ref 4.8–5.6)
HDLC SERPL-MCNC: 40 MG/DL (ref 40–60)
INTERPRETATION: NORMAL
LDLC SERPL CALC-MCNC: 56 MG/DL (ref 0–100)
Lab: NORMAL
POTASSIUM SERPL-SCNC: 4.7 MMOL/L (ref 3.5–5.2)
PROT SERPL-MCNC: 7.2 G/DL (ref 6–8.5)
SODIUM SERPL-SCNC: 138 MMOL/L (ref 136–145)
TRIGL SERPL-MCNC: 120 MG/DL (ref 0–150)
VLDLC SERPL CALC-MCNC: 24 MG/DL

## 2020-01-07 ENCOUNTER — OFFICE VISIT (OUTPATIENT)
Dept: FAMILY MEDICINE CLINIC | Facility: CLINIC | Age: 80
End: 2020-01-07

## 2020-01-07 VITALS
BODY MASS INDEX: 37.65 KG/M2 | HEIGHT: 65 IN | DIASTOLIC BLOOD PRESSURE: 70 MMHG | OXYGEN SATURATION: 96 % | SYSTOLIC BLOOD PRESSURE: 136 MMHG | HEART RATE: 74 BPM | WEIGHT: 226 LBS

## 2020-01-07 DIAGNOSIS — I25.10 CORONARY ARTERIOSCLEROSIS IN NATIVE ARTERY: ICD-10-CM

## 2020-01-07 DIAGNOSIS — I10 ESSENTIAL HYPERTENSION: ICD-10-CM

## 2020-01-07 DIAGNOSIS — Z00.00 MEDICARE ANNUAL WELLNESS VISIT, SUBSEQUENT: Primary | ICD-10-CM

## 2020-01-07 DIAGNOSIS — Z79.4 TYPE 2 DIABETES MELLITUS WITH COMPLICATION, WITH LONG-TERM CURRENT USE OF INSULIN (HCC): ICD-10-CM

## 2020-01-07 DIAGNOSIS — I48.0 PAROXYSMAL ATRIAL FIBRILLATION (HCC): ICD-10-CM

## 2020-01-07 DIAGNOSIS — E11.8 TYPE 2 DIABETES MELLITUS WITH COMPLICATION, WITH LONG-TERM CURRENT USE OF INSULIN (HCC): ICD-10-CM

## 2020-01-07 PROCEDURE — G0439 PPPS, SUBSEQ VISIT: HCPCS | Performed by: FAMILY MEDICINE

## 2020-01-07 RX ORDER — ENALAPRIL MALEATE 2.5 MG/1
2.5 TABLET ORAL DAILY
Qty: 90 TABLET | Refills: 3 | Status: SHIPPED | OUTPATIENT
Start: 2020-01-07 | End: 2021-01-18

## 2020-01-07 RX ORDER — METOPROLOL TARTRATE 50 MG/1
50 TABLET, FILM COATED ORAL EVERY 12 HOURS SCHEDULED
Qty: 180 TABLET | Refills: 3 | Status: SHIPPED | OUTPATIENT
Start: 2020-01-07 | End: 2021-01-18

## 2020-01-07 RX ORDER — FUROSEMIDE 40 MG/1
40 TABLET ORAL DAILY
Qty: 90 TABLET | Refills: 3 | Status: SHIPPED | OUTPATIENT
Start: 2020-01-07 | End: 2021-01-18

## 2020-01-07 RX ORDER — SIMVASTATIN 80 MG
80 TABLET ORAL DAILY
Qty: 90 TABLET | Refills: 3 | Status: SHIPPED | OUTPATIENT
Start: 2020-01-07 | End: 2020-01-13 | Stop reason: SDUPTHER

## 2020-01-07 NOTE — PROGRESS NOTES
The ABCs of the Annual Wellness Visit  Subsequent Medicare Wellness Visit    Chief Complaint   Patient presents with   • Annual Exam     humana & Medicare   • Hypertension   • Diabetes       Subjective   History of Present Illness:  Bunny Chacon is a 79 y.o. male who presents for a Subsequent Medicare Wellness Visit.    HEALTH RISK ASSESSMENT    Recent Hospitalizations:  No hospitalization(s) within the last year.    Current Medical Providers:  Patient Care Team:  Jose Stevens DO as PCP - General  Ric Rios MD as PCP - Claims Attributed  Michael Pemberton MD as Consulting Physician (Cardiology)  Iram Polanco MD as Consulting Physician (Dermatology)  Mickey Fortune MD as Consulting Physician (Ophthalmology)  Kris Barney MD (Pulmonary Disease)  Lily Ureña MD (Retina Ophthalmology)  Ric Rios MD as Consulting Physician (Endocrinology)    Smoking Status:  Social History     Tobacco Use   Smoking Status Former Smoker   • Packs/day: 1.50   • Years: 50.00   • Pack years: 75.00   Smokeless Tobacco Never Used       Alcohol Consumption:  Social History     Substance and Sexual Activity   Alcohol Use No       Depression Screen:   PHQ-2/PHQ-9 Depression Screening 1/7/2020   Little interest or pleasure in doing things 0   Feeling down, depressed, or hopeless 0   Trouble falling or staying asleep, or sleeping too much 0   Feeling tired or having little energy 0   Poor appetite or overeating 0   Feeling bad about yourself - or that you are a failure or have let yourself or your family down 0   Trouble concentrating on things, such as reading the newspaper or watching television 0   Moving or speaking so slowly that other people could have noticed. Or the opposite - being so fidgety or restless that you have been moving around a lot more than usual 0   Thoughts that you would be better off dead, or of hurting yourself in some way 0   Total Score 0   If you checked off  any problems, how difficult have these problems made it for you to do your work, take care of things at home, or get along with other people? Not difficult at all       Fall Risk Screen:  IVA Fall Risk Assessment has not been completed.    Health Habits and Functional and Cognitive Screening:  Functional & Cognitive Status 1/7/2020   Do you have difficulty preparing food and eating? No   Do you have difficulty bathing yourself, getting dressed or grooming yourself? No   Do you have difficulty using the toilet? No   Do you have difficulty moving around from place to place? No   Do you have trouble with steps or getting out of a bed or a chair? No   Current Diet Well Balanced Diet   Dental Exam Not up to date   Eye Exam Up to date   Exercise (times per week) 0 times per week   Current Exercise Activities Include None   Do you need help using the phone?  No   Are you deaf or do you have serious difficulty hearing?  No   Do you need help with transportation? No   Do you need help shopping? No   Do you need help preparing meals?  No   Do you need help with housework?  No   Do you need help with laundry? No   Do you need help taking your medications? No   Do you need help managing money? No   Do you ever drive or ride in a car without wearing a seat belt? No   Have you felt unusual stress, anger or loneliness in the last month? No   Who do you live with? Spouse   If you need help, do you have trouble finding someone available to you? No   Have you been bothered in the last four weeks by sexual problems? No   Do you have difficulty concentrating, remembering or making decisions? No         Does the patient have evidence of cognitive impairment? No    Asprin use counseling:Taking ASA appropriately as indicated    Age-appropriate Screening Schedule:  Refer to the list below for future screening recommendations based on patient's age, sex and/or medical conditions. Orders for these recommended tests are listed in the plan  section. The patient has been provided with a written plan.    Health Maintenance   Topic Date Due   • DIABETIC FOOT EXAM  01/14/2020   • HEMOGLOBIN A1C  04/01/2020   • DIABETIC EYE EXAM  05/02/2020   • URINE MICROALBUMIN  05/22/2020   • LIPID PANEL  10/01/2020   • COLONOSCOPY  01/04/2028   • TDAP/TD VACCINES (3 - Td) 03/03/2028   • INFLUENZA VACCINE  Completed          The following portions of the patient's history were reviewed and updated as appropriate: allergies, current medications, past family history, past medical history, past social history, past surgical history and problem list.    Outpatient Medications Prior to Visit   Medication Sig Dispense Refill   • aspirin 81 MG tablet Take 81 mg by mouth daily.     • glucose blood (ONE TOUCH ULTRA TEST) test strip Dx code E11.8 TEST BLOOD SUGAR 5 TIMES A  each 0   • insulin NPH (NOVOLIN N RELION) 100 UNIT/ML injection 44 units at breakfast and 10 units at night 40 mL 2   • Insulin Pen Needle ([employer] PEN NEEDLES) 31G X 6 MM misc USE AS DIRECTED.     • insulin regular (NOVOLIN R RELION) 100 UNIT/ML injection 8 units at breakfast and 11 units at dinner 30 mL 2   • Multiple Vitamin (MULTI VITAMIN DAILY) tablet Take 1 tablet by mouth daily.     • multivitamin-minerals (OCUVITE) tablet Take 1 tablet by mouth 2 (two) times a day.     • Omega-3 Fatty Acids (GNP FISH OIL) 1000 MG capsule Take 1 capsule by mouth daily.     • ONETOUCH DELICA LANCETS FINE misc      • warfarin (COUMADIN) 7.5 MG tablet Take 1 tablet by mouth Daily. 1 1/2 tablet tues & thur 1 tablet the rest of the week     • enalapril (VASOTEC) 2.5 MG tablet Take 1 tablet by mouth Daily. 90 tablet 3   • furosemide (LASIX) 40 MG tablet Take 1 tablet by mouth Daily. 90 tablet 3   • metoprolol tartrate (LOPRESSOR) 50 MG tablet Take 1 tablet by mouth Every 12 (Twelve) Hours. 180 tablet 3   • simvastatin (ZOCOR) 80 MG tablet Take 1 tablet by mouth Daily. 90 tablet 3     No facility-administered medications  "prior to visit.        Patient Active Problem List   Diagnosis   • Type 2 diabetes mellitus with complication, with long-term current use of insulin (CMS/[facility])   • Hyperlipidemia   • Essential hypertension   • CAD (coronary artery disease)   • Sleep apnea   • Paroxysmal atrial fibrillation (CMS/[facility])   • Macular degeneration   • History of atrial fibrillation   • ASCVD (arteriosclerotic cardiovascular disease)   • Diabetic eye exam (CMS/[facility])   • Long term current use of anticoagulant       Advanced Care Planning:  Patient does not have an advance directive - information provided to the patient today    Review of Systems   Respiratory: Negative for shortness of breath and wheezing.    Cardiovascular: Negative for chest pain, palpitations and leg swelling.   Neurological: Negative for syncope.       Compared to one year ago, the patient feels his physical health is the same.  Compared to one year ago, the patient feels his mental health is the same.    Reviewed chart for potential of high risk medication in the elderly: yes  Reviewed chart for potential of harmful drug interactions in the elderly:yes    Objective         Vitals:    01/07/20 1458   BP: 136/70   BP Location: Left arm   Patient Position: Sitting   Cuff Size: Large Adult   Pulse: 74   SpO2: 96%   Weight: 103 kg (226 lb)   Height: 165.1 cm (65\")   PainSc: 0-No pain       Body mass index is 37.61 kg/m².  Discussed the patient's BMI with him. The BMI is above average; BMI management plan is completed.    Physical Exam   Constitutional: He appears well-developed and well-nourished.   HENT:   Head: Normocephalic and atraumatic.   Neck: Neck supple. No JVD present. No thyromegaly present.   Cardiovascular: Normal rate, regular rhythm and normal heart sounds. Exam reveals no gallop and no friction rub.   No murmur heard.  Pulmonary/Chest: Effort normal and breath sounds normal. No respiratory distress. He has no wheezes. He has no rales.   Abdominal: Soft. Bowel " sounds are normal. He exhibits no distension. There is no tenderness. There is no rebound and no guarding.   Musculoskeletal: He exhibits no edema.   Neurological: He is alert.   Skin: Skin is warm and dry.   Psychiatric: He has a normal mood and affect. His behavior is normal.   Nursing note and vitals reviewed.            Assessment/Plan   Medicare Risks and Personalized Health Plan  CMS Preventative Services Quick Reference  Obesity/Overweight     The above risks/problems have been discussed with the patient.  Pertinent information has been shared with the patient in the After Visit Summary.  Follow up plans and orders are seen below in the Assessment/Plan Section.    Diagnoses and all orders for this visit:    1. Medicare annual wellness visit, subsequent (Primary)    2. Essential hypertension  -     enalapril (VASOTEC) 2.5 MG tablet; Take 1 tablet by mouth Daily.  Dispense: 90 tablet; Refill: 3  -     metoprolol tartrate (LOPRESSOR) 50 MG tablet; Take 1 tablet by mouth Every 12 (Twelve) Hours.  Dispense: 180 tablet; Refill: 3    3. Type 2 diabetes mellitus with complication, with long-term current use of insulin (CMS/[facility])  -     enalapril (VASOTEC) 2.5 MG tablet; Take 1 tablet by mouth Daily.  Dispense: 90 tablet; Refill: 3    4. Coronary arteriosclerosis in native artery  -     furosemide (LASIX) 40 MG tablet; Take 1 tablet by mouth Daily.  Dispense: 90 tablet; Refill: 3  -     metoprolol tartrate (LOPRESSOR) 50 MG tablet; Take 1 tablet by mouth Every 12 (Twelve) Hours.  Dispense: 180 tablet; Refill: 3  -     Discontinue: simvastatin (ZOCOR) 80 MG tablet; Take 1 tablet by mouth Daily.  Dispense: 90 tablet; Refill: 3    5. Paroxysmal atrial fibrillation (CMS/[facility])  -     metoprolol tartrate (LOPRESSOR) 50 MG tablet; Take 1 tablet by mouth Every 12 (Twelve) Hours.  Dispense: 180 tablet; Refill: 3      Follow Up:  Return in about 1 year (around 1/7/2021), or if symptoms worsen or fail to improve.     An After  Visit Summary and PPPS were given to the patient.

## 2020-01-07 NOTE — PATIENT INSTRUCTIONS
Advance Directive    Advance directives are legal documents that let you make choices ahead of time about your health care and medical treatment in case you become unable to communicate for yourself. Advance directives are a way for you to communicate your wishes to family, friends, and health care providers. This can help convey your decisions about end-of-life care if you become unable to communicate.  Discussing and writing advance directives should happen over time rather than all at once. Advance directives can be changed depending on your situation and what you want, even after you have signed the advance directives.  If you do not have an advance directive, some states assign family decision makers to act on your behalf based on how closely you are related to them. Each state has its own laws regarding advance directives. You may want to check with your health care provider, , or state representative about the laws in your state. There are different types of advance directives, such as:  · Medical power of .  · Living will.  · Do not resuscitate (DNR) or do not attempt resuscitation (DNAR) order.  Health care proxy and medical power of   A health care proxy, also called a health care agent, is a person who is appointed to make medical decisions for you in cases in which you are unable to make the decisions yourself. Generally, people choose someone they know well and trust to represent their preferences. Make sure to ask this person for an agreement to act as your proxy. A proxy may have to exercise judgment in the event of a medical decision for which your wishes are not known.  A medical power of  is a legal document that names your health care proxy. Depending on the laws in your state, after the document is written, it may also need to be:  · Signed.  · Notarized.  · Dated.  · Copied.  · Witnessed.  · Incorporated into your medical record.  You may also want to appoint  someone to manage your financial affairs in a situation in which you are unable to do so. This is called a durable power of  for finances. It is a separate legal document from the durable power of  for health care. You may choose the same person or someone different from your health care proxy to act as your agent in financial matters.  If you do not appoint a proxy, or if there is a concern that the proxy is not acting in your best interests, a court-appointed guardian may be designated to act on your behalf.  Living will  A living will is a set of instructions documenting your wishes about medical care when you cannot express them yourself. Health care providers should keep a copy of your living will in your medical record. You may want to give a copy to family members or friends. To alert caregivers in case of an emergency, you can place a card in your wallet to let them know that you have a living will and where they can find it. A living will is used if you become:  · Terminally ill.  · Incapacitated.  · Unable to communicate or make decisions.  Items to consider in your living will include:  · The use or non-use of life-sustaining equipment, such as dialysis machines and breathing machines (ventilators).  · A DNR or DNAR order, which is the instruction not to use cardiopulmonary resuscitation (CPR) if breathing or heartbeat stops.  · The use or non-use of tube feeding.  · Withholding of food and fluids.  · Comfort (palliative) care when the goal becomes comfort rather than a cure.  · Organ and tissue donation.  A living will does not give instructions for distributing your money and property if you should pass away. It is recommended that you seek the advice of a  when writing a will. Decisions about taxes, beneficiaries, and asset distribution will be legally binding. This process can relieve your family and friends of any concerns surrounding disputes or questions that may come up about  the distribution of your assets.  DNR or DNAR  A DNR or DNAR order is a request not to have CPR in the event that your heart stops beating or you stop breathing. If a DNR or DNAR order has not been made and shared, a health care provider will try to help any patient whose heart has stopped or who has stopped breathing. If you plan to have surgery, talk with your health care provider about how your DNR or DNAR order will be followed if problems occur.  Summary  · Advance directives are the legal documents that allow you to make choices ahead of time about your health care and medical treatment in case you become unable to communicate for yourself.  · The process of discussing and writing advance directives should happen over time. You can change the advance directives, even after you have signed them.  · Advance directives include DNR or DNAR orders, living ortega, and designating an agent as your medical power of .  This information is not intended to replace advice given to you by your health care provider. Make sure you discuss any questions you have with your health care provider.  Document Released: 03/26/2009 Document Revised: 11/06/2017 Document Reviewed: 11/06/2017  [employer] Interactive Patient Education © 2019 [employer]      Calorie Counting for Weight Loss  Calories are units of energy. Your body needs a certain amount of calories from food to keep you going throughout the day. When you eat more calories than your body needs, your body stores the extra calories as fat. When you eat fewer calories than your body needs, your body burns fat to get the energy it needs.  Calorie counting means keeping track of how many calories you eat and drink each day. Calorie counting can be helpful if you need to lose weight. If you make sure to eat fewer calories than your body needs, you should lose weight. Ask your health care provider what a healthy weight is for you.  For calorie counting to work, you will  need to eat the right number of calories in a day in order to lose a healthy amount of weight per week. A dietitian can help you determine how many calories you need in a day and will give you suggestions on how to reach your calorie goal.  · A healthy amount of weight to lose per week is usually 1-2 lb (0.5-0.9 kg). This usually means that your daily calorie intake should be reduced by 500-750 calories.  · Eating 1,200 - 1,500 calories per day can help most women lose weight.  · Eating 1,500 - 1,800 calories per day can help most men lose weight.  What is my plan?  My goal is to have __________ calories per day.  If I have this many calories per day, I should lose around __________ pounds per week.  What do I need to know about calorie counting?  In order to meet your daily calorie goal, you will need to:  · Find out how many calories are in each food you would like to eat. Try to do this before you eat.  · Decide how much of the food you plan to eat.  · Write down what you ate and how many calories it had. Doing this is called keeping a food log.  To successfully lose weight, it is important to balance calorie counting with a healthy lifestyle that includes regular activity. Aim for 150 minutes of moderate exercise (such as walking) or 75 minutes of vigorous exercise (such as running) each week.  Where do I find calorie information?    The number of calories in a food can be found on a Nutrition Facts label. If a food does not have a Nutrition Facts label, try to look up the calories online or ask your dietitian for help.  Remember that calories are listed per serving. If you choose to have more than one serving of a food, you will have to multiply the calories per serving by the amount of servings you plan to eat. For example, the label on a package of bread might say that a serving size is 1 slice and that there are 90 calories in a serving. If you eat 1 slice, you will have eaten 90 calories. If you eat 2  "slices, you will have eaten 180 calories.  How do I keep a food log?  Immediately after each meal, record the following information in your food log:  · What you ate. Don't forget to include toppings, sauces, and other extras on the food.  · How much you ate. This can be measured in cups, ounces, or number of items.  · How many calories each food and drink had.  · The total number of calories in the meal.  Keep your food log near you, such as in a small notebook in your pocket, or use a mobile lisa or website. Some programs will calculate calories for you and show you how many calories you have left for the day to meet your goal.  What are some calorie counting tips?    · Use your calories on foods and drinks that will fill you up and not leave you hungry:  ? Some examples of foods that fill you up are nuts and nut butters, vegetables, lean proteins, and high-fiber foods like whole grains. High-fiber foods are foods with more than 5 g fiber per serving.  ? Drinks such as sodas, specialty coffee drinks, alcohol, and juices have a lot of calories, yet do not fill you up.  · Eat nutritious foods and avoid empty calories. Empty calories are calories you get from foods or beverages that do not have many vitamins or protein, such as candy, sweets, and soda. It is better to have a nutritious high-calorie food (such as an avocado) than a food with few nutrients (such as a bag of chips).  · Know how many calories are in the foods you eat most often. This will help you calculate calorie counts faster.  · Pay attention to calories in drinks. Low-calorie drinks include water and unsweetened drinks.  · Pay attention to nutrition labels for \"low fat\" or \"fat free\" foods. These foods sometimes have the same amount of calories or more calories than the full fat versions. They also often have added sugar, starch, or salt, to make up for flavor that was removed with the fat.  · Find a way of tracking calories that works for you. Get " creative. Try different apps or programs if writing down calories does not work for you.  What are some portion control tips?  · Know how many calories are in a serving. This will help you know how many servings of a certain food you can have.  · Use a measuring cup to measure serving sizes. You could also try weighing out portions on a kitchen scale. With time, you will be able to estimate serving sizes for some foods.  · Take some time to put servings of different foods on your favorite plates, bowls, and cups so you know what a serving looks like.  · Try not to eat straight from a bag or box. Doing this can lead to overeating. Put the amount you would like to eat in a cup or on a plate to make sure you are eating the right portion.  · Use smaller plates, glasses, and bowls to prevent overeating.  · Try not to multitask (for example, watch TV or use your computer) while eating. If it is time to eat, sit down at a table and enjoy your food. This will help you to know when you are full. It will also help you to be aware of what you are eating and how much you are eating.  What are tips for following this plan?  Reading food labels  · Check the calorie count compared to the serving size. The serving size may be smaller than what you are used to eating.  · Check the source of the calories. Make sure the food you are eating is high in vitamins and protein and low in saturated and trans fats.  Shopping  · Read nutrition labels while you shop. This will help you make healthy decisions before you decide to purchase your food.  · Make a grocery list and stick to it.  Cooking  · Try to cook your favorite foods in a healthier way. For example, try baking instead of frying.  · Use low-fat dairy products.  Meal planning  · Use more fruits and vegetables. Half of your plate should be fruits and vegetables.  · Include lean proteins like poultry and fish.  How do I count calories when eating out?  · Ask for smaller portion  "sizes.  · Consider sharing an entree and sides instead of getting your own entree.  · If you get your own entree, eat only half. Ask for a box at the beginning of your meal and put the rest of your entree in it so you are not tempted to eat it.  · If calories are listed on the menu, choose the lower calorie options.  · Choose dishes that include vegetables, fruits, whole grains, low-fat dairy products, and lean protein.  · Choose items that are boiled, broiled, grilled, or steamed. Stay away from items that are buttered, battered, fried, or served with cream sauce. Items labeled \"crispy\" are usually fried, unless stated otherwise.  · Choose water, low-fat milk, unsweetened iced tea, or other drinks without added sugar. If you want an alcoholic beverage, choose a lower calorie option such as a glass of wine or light beer.  · Ask for dressings, sauces, and syrups on the side. These are usually high in calories, so you should limit the amount you eat.  · If you want a salad, choose a garden salad and ask for grilled meats. Avoid extra toppings like mccray, cheese, or fried items. Ask for the dressing on the side, or ask for olive oil and vinegar or lemon to use as dressing.  · Estimate how many servings of a food you are given. For example, a serving of cooked rice is ½ cup or about the size of half a baseball. Knowing serving sizes will help you be aware of how much food you are eating at restaurants. The list below tells you how big or small some common portion sizes are based on everyday objects:  ? 1 oz--4 stacked dice.  ? 3 oz--1 deck of cards.  ? 1 tsp--1 die.  ? 1 Tbsp--½ a ping-pong ball.  ? 2 Tbsp--1 ping-pong ball.  ? ½ cup--½ baseball.  ? 1 cup--1 baseball.  Summary  · Calorie counting means keeping track of how many calories you eat and drink each day. If you eat fewer calories than your body needs, you should lose weight.  · A healthy amount of weight to lose per week is usually 1-2 lb (0.5-0.9 kg). This " usually means reducing your daily calorie intake by 500-750 calories.  · The number of calories in a food can be found on a Nutrition Facts label. If a food does not have a Nutrition Facts label, try to look up the calories online or ask your dietitian for help.  · Use your calories on foods and drinks that will fill you up, and not on foods and drinks that will leave you hungry.  · Use smaller plates, glasses, and bowls to prevent overeating.  This information is not intended to replace advice given to you by your health care provider. Make sure you discuss any questions you have with your health care provider.  Document Released: 12/18/2006 Document Revised: 09/06/2019 Document Reviewed: 11/17/2017  "[employer] Interactive Patient Education © 2019 "[employer] Inc[employer]      Exercising to Lose Weight  Exercise is structured, repetitive physical activity to improve fitness and health. Getting regular exercise is important for everyone. It is especially important if you are overweight. Being overweight increases your risk of heart disease, stroke, diabetes, high blood pressure, and several types of cancer. Reducing your calorie intake and exercising can help you lose weight.  Exercise is usually categorized as moderate or vigorous intensity. To lose weight, most people need to do a certain amount of moderate-intensity or vigorous-intensity exercise each week.  Moderate-intensity exercise    Moderate-intensity exercise is any activity that gets you moving enough to burn at least three times more energy (calories) than if you were sitting.  Examples of moderate exercise include:  · Walking a mile in 15 minutes.  · Doing light yard work.  · Biking at an easy pace.  Most people should get at least 150 minutes (2 hours and 30 minutes) a week of moderate-intensity exercise to maintain their body weight.  Vigorous-intensity exercise  Vigorous-intensity exercise is any activity that gets you moving enough to burn at least six times  more calories than if you were sitting. When you exercise at this intensity, you should be working hard enough that you are not able to carry on a conversation.  Examples of vigorous exercise include:  · Running.  · Playing a team sport, such as football, basketball, and soccer.  · Jumping rope.  Most people should get at least 75 minutes (1 hour and 15 minutes) a week of vigorous-intensity exercise to maintain their body weight.  How can exercise affect me?  When you exercise enough to burn more calories than you eat, you lose weight. Exercise also reduces body fat and builds muscle. The more muscle you have, the more calories you burn. Exercise also:  · Improves mood.  · Reduces stress and tension.  · Improves your overall fitness, flexibility, and endurance.  · Increases bone strength.  The amount of exercise you need to lose weight depends on:  · Your age.  · The type of exercise.  · Any health conditions you have.  · Your overall physical ability.  Talk to your health care provider about how much exercise you need and what types of activities are safe for you.  What actions can I take to lose weight?  Nutrition    · Make changes to your diet as told by your health care provider or diet and nutrition specialist (dietitian). This may include:  ? Eating fewer calories.  ? Eating more protein.  ? Eating less unhealthy fats.  ? Eating a diet that includes fresh fruits and vegetables, whole grains, low-fat dairy products, and lean protein.  ? Avoiding foods with added fat, salt, and sugar.  · Drink plenty of water while you exercise to prevent dehydration or heat stroke.  Activity  · Choose an activity that you enjoy and set realistic goals. Your health care provider can help you make an exercise plan that works for you.  · Exercise at a moderate or vigorous intensity most days of the week.  ? The intensity of exercise may vary from person to person. You can tell how intense a workout is for you by paying attention  to your breathing and heartbeat. Most people will notice their breathing and heartbeat get faster with more intense exercise.  · Do resistance training twice each week, such as:  ? Push-ups.  ? Sit-ups.  ? Lifting weights.  ? Using resistance bands.  · Getting short amounts of exercise can be just as helpful as long structured periods of exercise. If you have trouble finding time to exercise, try to include exercise in your daily routine.  ? Get up, stretch, and walk around every 30 minutes throughout the day.  ? Go for a walk during your lunch break.  ? Park your car farther away from your destination.  ? If you take public transportation, get off one stop early and walk the rest of the way.  ? Make phone calls while standing up and walking around.  ? Take the stairs instead of elevators or escalators.  · Wear comfortable clothes and shoes with good support.  · Do not exercise so much that you hurt yourself, feel dizzy, or get very short of breath.  Where to find more information  · [place] Department of Health and Human Services: www.hhs.gov  · Centers for Disease Control and Prevention (CDC): www.cdc.gov  Contact a health care provider:  · Before starting a new exercise program.  · If you have questions or concerns about your weight.  · If you have a medical problem that keeps you from exercising.  Get help right away if you have any of the following while exercising:  · Injury.  · Dizziness.  · Difficulty breathing or shortness of breath that does not go away when you stop exercising.  · Chest pain.  · Rapid heartbeat.  Summary  · Being overweight increases your risk of heart disease, stroke, diabetes, high blood pressure, and several types of cancer.  · Losing weight happens when you burn more calories than you eat.  · Reducing the amount of calories you eat in addition to getting regular moderate or vigorous exercise each week helps you lose weight.  This information is not intended to replace advice given to you  by your health care provider. Make sure you discuss any questions you have with your health care provider.  Document Released: 01/20/2012 Document Revised: 12/31/2018 Document Reviewed: 12/31/2018  [employer] Interactive Patient Education © 2019 [employer]      Fall Prevention in the Home, Adult  Falls can cause injuries and can affect people from all age groups. There are many simple things that you can do to make your home safe and to help prevent falls. Ask for help when making these changes, if needed.  What actions can I take to prevent falls?  General instructions  · Use good lighting in all rooms. Replace any light bulbs that burn out.  · Turn on lights if it is dark. Use night-lights.  · Place frequently used items in easy-to-reach places. Lower the shelves around your home if necessary.  · Set up furniture so that there are clear paths around it. Avoid moving your furniture around.  · Remove throw rugs and other tripping hazards from the floor.  · Avoid walking on wet floors.  · Fix any uneven floor surfaces.  · Add color or contrast paint or tape to grab bars and handrails in your home. Place contrasting color strips on the first and last steps of stairways.  · When you use a stepladder, make sure that it is completely opened and that the sides are firmly locked. Have someone hold the ladder while you are using it. Do not climb a closed stepladder.  · Be aware of any and all pets.  What can I do in the bathroom?         · Keep the floor dry. Immediately clean up any water that spills onto the floor.  · Remove soap buildup in the tub or shower on a regular basis.  · Use non-skid mats or decals on the floor of the tub or shower.  · Attach bath mats securely with double-sided, non-slip rug tape.  · If you need to sit down while you are in the shower, use a plastic, non-slip stool.  · Install grab bars by the toilet and in the tub and shower. Do not use towel bars as grab bars.  What can I do in the  bedroom?  · Make sure that a bedside light is easy to reach.  · Do not use oversized bedding that drapes onto the floor.  · Have a firm chair that has side arms to use for getting dressed.  What can I do in the kitchen?  · Clean up any spills right away.  · If you need to reach for something above you, use a sturdy step stool that has a grab bar.  · Keep electrical cables out of the way.  · Do not use floor polish or wax that makes floors slippery. If you must use wax, make sure that it is non-skid floor wax.  What can I do in the stairways?  · Do not leave any items on the stairs.  · Make sure that you have a light switch at the top of the stairs and the bottom of the stairs. Have them installed if you do not have them.  · Make sure that there are handrails on both sides of the stairs. Fix handrails that are broken or loose. Make sure that handrails are as long as the stairways.  · Install non-slip stair treads on all stairs in your home.  · Avoid having throw rugs at the top or bottom of stairways, or secure the rugs with carpet tape to prevent them from moving.  · Choose a carpet design that does not hide the edge of steps on the stairway.  · Check any carpeting to make sure that it is firmly attached to the stairs. Fix any carpet that is loose or worn.  What can I do on the outside of my home?  · Use bright outdoor lighting.  · Regularly repair the edges of walkways and driveways and fix any cracks.  · Remove high doorway thresholds.  · Trim any shrubbery on the main path into your home.  · Regularly check that handrails are securely fastened and in good repair. Both sides of any steps should have handrails.  · Install guardrails along the edges of any raised decks or porches.  · Clear walkways of debris and clutter, including tools and rocks.  · Have leaves, snow, and ice cleared regularly.  · Use sand or salt on walkways during winter months.  · In the garage, clean up any spills right away, including grease  or oil spills.  What other actions can I take?  · Wear closed-toe shoes that fit well and support your feet. Wear shoes that have rubber soles or low heels.  · Use mobility aids as needed, such as canes, walkers, scooters, and crutches.  · Review your medicines with your health care provider. Some medicines can cause dizziness or changes in blood pressure, which increase your risk of falling.  Talk with your health care provider about other ways that you can decrease your risk of falls. This may include working with a physical therapist or  to improve your strength, balance, and endurance.  Where to find more information  · Centers for Disease Control and Prevention, [place]: https://www.cdc.gov  · National [place] on Aging: https://we0yhrq.saurabh.nih.gov  Contact a health care provider if:  · You are afraid of falling at home.  · You feel weak, drowsy, or dizzy at home.  · You fall at home.  Summary  · There are many simple things that you can do to make your home safe and to help prevent falls.  · Ways to make your home safe include removing tripping hazards and installing grab bars in the bathroom.  · Ask for help when making these changes in your home.  This information is not intended to replace advice given to you by your health care provider. Make sure you discuss any questions you have with your health care provider.  Document Released: 12/08/2003 Document Revised: 08/02/2018 Document Reviewed: 08/02/2018  [employer] Patient Education © 2019 [employer]

## 2020-01-13 ENCOUNTER — TELEPHONE (OUTPATIENT)
Dept: FAMILY MEDICINE CLINIC | Facility: CLINIC | Age: 80
End: 2020-01-13

## 2020-01-13 DIAGNOSIS — I25.10 CORONARY ARTERIOSCLEROSIS IN NATIVE ARTERY: ICD-10-CM

## 2020-01-13 RX ORDER — SIMVASTATIN 80 MG
80 TABLET ORAL DAILY
Qty: 90 TABLET | Refills: 3 | Status: SHIPPED | OUTPATIENT
Start: 2020-01-13 | End: 2021-01-18

## 2020-02-03 ENCOUNTER — OFFICE VISIT (OUTPATIENT)
Dept: ENDOCRINOLOGY | Age: 80
End: 2020-02-03

## 2020-02-03 VITALS
HEART RATE: 86 BPM | BODY MASS INDEX: 37.39 KG/M2 | OXYGEN SATURATION: 99 % | SYSTOLIC BLOOD PRESSURE: 124 MMHG | DIASTOLIC BLOOD PRESSURE: 74 MMHG | WEIGHT: 224.4 LBS | HEIGHT: 65 IN

## 2020-02-03 DIAGNOSIS — E78.5 HYPERLIPIDEMIA, UNSPECIFIED HYPERLIPIDEMIA TYPE: ICD-10-CM

## 2020-02-03 DIAGNOSIS — G47.33 OBSTRUCTIVE SLEEP APNEA SYNDROME: ICD-10-CM

## 2020-02-03 DIAGNOSIS — Z79.4 TYPE 2 DIABETES MELLITUS WITH COMPLICATION, WITH LONG-TERM CURRENT USE OF INSULIN (HCC): Primary | ICD-10-CM

## 2020-02-03 DIAGNOSIS — E11.8 TYPE 2 DIABETES MELLITUS WITH COMPLICATION, WITH LONG-TERM CURRENT USE OF INSULIN (HCC): Primary | ICD-10-CM

## 2020-02-03 DIAGNOSIS — I10 ESSENTIAL HYPERTENSION: ICD-10-CM

## 2020-02-03 DIAGNOSIS — I25.10 CORONARY ARTERY DISEASE INVOLVING NATIVE CORONARY ARTERY OF NATIVE HEART WITHOUT ANGINA PECTORIS: ICD-10-CM

## 2020-02-03 PROCEDURE — 99214 OFFICE O/P EST MOD 30 MIN: CPT | Performed by: INTERNAL MEDICINE

## 2020-02-04 LAB
ALBUMIN SERPL-MCNC: 4.4 G/DL (ref 3.7–4.7)
ALBUMIN/GLOB SERPL: 2.1 {RATIO} (ref 1.2–2.2)
ALP SERPL-CCNC: 80 IU/L (ref 39–117)
ALT SERPL-CCNC: 38 IU/L (ref 0–44)
AST SERPL-CCNC: 49 IU/L (ref 0–40)
BILIRUB SERPL-MCNC: 0.9 MG/DL (ref 0–1.2)
BUN SERPL-MCNC: 15 MG/DL (ref 8–27)
BUN/CREAT SERPL: 14 (ref 10–24)
CALCIUM SERPL-MCNC: 9.3 MG/DL (ref 8.6–10.2)
CHLORIDE SERPL-SCNC: 105 MMOL/L (ref 96–106)
CHOLEST SERPL-MCNC: 106 MG/DL (ref 100–199)
CO2 SERPL-SCNC: 22 MMOL/L (ref 20–29)
CREAT SERPL-MCNC: 1.1 MG/DL (ref 0.76–1.27)
GLOBULIN SER CALC-MCNC: 2.1 G/DL (ref 1.5–4.5)
GLUCOSE SERPL-MCNC: 142 MG/DL (ref 65–99)
HBA1C MFR BLD: 9.1 % (ref 4.8–5.6)
HDLC SERPL-MCNC: 36 MG/DL
INTERPRETATION: NORMAL
LDLC SERPL CALC-MCNC: 52 MG/DL (ref 0–99)
Lab: NORMAL
POTASSIUM SERPL-SCNC: 5.2 MMOL/L (ref 3.5–5.2)
PROT SERPL-MCNC: 6.5 G/DL (ref 6–8.5)
SODIUM SERPL-SCNC: 142 MMOL/L (ref 134–144)
TRIGL SERPL-MCNC: 91 MG/DL (ref 0–149)
TSH SERPL DL<=0.005 MIU/L-ACNC: 1.15 UIU/ML (ref 0.45–4.5)
VLDLC SERPL CALC-MCNC: 18 MG/DL (ref 5–40)

## 2020-02-06 DIAGNOSIS — Z79.4 TYPE 2 DIABETES MELLITUS WITH COMPLICATION, WITH LONG-TERM CURRENT USE OF INSULIN (HCC): ICD-10-CM

## 2020-02-06 DIAGNOSIS — E11.8 TYPE 2 DIABETES MELLITUS WITH COMPLICATION, WITH LONG-TERM CURRENT USE OF INSULIN (HCC): ICD-10-CM

## 2020-06-11 ENCOUNTER — TELEPHONE (OUTPATIENT)
Dept: ENDOCRINOLOGY | Age: 80
End: 2020-06-11

## 2020-08-23 DIAGNOSIS — E11.8 TYPE 2 DIABETES MELLITUS WITH COMPLICATION, WITH LONG-TERM CURRENT USE OF INSULIN (HCC): ICD-10-CM

## 2020-08-23 DIAGNOSIS — Z79.4 TYPE 2 DIABETES MELLITUS WITH COMPLICATION, WITH LONG-TERM CURRENT USE OF INSULIN (HCC): ICD-10-CM

## 2020-09-15 RX ORDER — BLOOD SUGAR DIAGNOSTIC
STRIP MISCELLANEOUS
Qty: 450 EACH | Refills: 1 | Status: SHIPPED | OUTPATIENT
Start: 2020-09-15 | End: 2020-09-16

## 2020-09-16 RX ORDER — BLOOD SUGAR DIAGNOSTIC
STRIP MISCELLANEOUS
Qty: 450 EACH | Refills: 1 | Status: SHIPPED | OUTPATIENT
Start: 2020-09-16 | End: 2021-12-06

## 2020-10-07 ENCOUNTER — OFFICE VISIT (OUTPATIENT)
Dept: ENDOCRINOLOGY | Age: 80
End: 2020-10-07

## 2020-10-07 DIAGNOSIS — E11.9 TYPE 2 DIABETES MELLITUS WITHOUT COMPLICATION, WITHOUT LONG-TERM CURRENT USE OF INSULIN (HCC): Primary | ICD-10-CM

## 2020-10-07 DIAGNOSIS — I25.10 CORONARY ARTERY DISEASE INVOLVING NATIVE CORONARY ARTERY OF NATIVE HEART WITHOUT ANGINA PECTORIS: ICD-10-CM

## 2020-10-07 DIAGNOSIS — E78.5 HYPERLIPIDEMIA, UNSPECIFIED HYPERLIPIDEMIA TYPE: ICD-10-CM

## 2020-10-07 DIAGNOSIS — I10 ESSENTIAL HYPERTENSION: ICD-10-CM

## 2020-10-07 PROCEDURE — 99214 OFFICE O/P EST MOD 30 MIN: CPT | Performed by: INTERNAL MEDICINE

## 2020-10-07 RX ORDER — IPRATROPIUM BROMIDE AND ALBUTEROL SULFATE 2.5; .5 MG/3ML; MG/3ML
3 SOLUTION RESPIRATORY (INHALATION) 4 TIMES DAILY
COMMUNITY
Start: 2020-06-29 | End: 2020-10-07

## 2020-10-15 RX ORDER — HUMAN INSULIN 100 [IU]/ML
INJECTION, SUSPENSION SUBCUTANEOUS
Qty: 50 ML | Refills: 1
Start: 2020-10-15 | End: 2021-02-09

## 2020-11-19 DIAGNOSIS — R68.89 ABNORMAL ENDOCRINE LABORATORY TEST FINDING: ICD-10-CM

## 2020-11-19 DIAGNOSIS — E78.5 HYPERLIPIDEMIA, UNSPECIFIED HYPERLIPIDEMIA TYPE: ICD-10-CM

## 2020-11-19 DIAGNOSIS — I10 ESSENTIAL HYPERTENSION: ICD-10-CM

## 2020-11-19 DIAGNOSIS — E11.9 TYPE 2 DIABETES MELLITUS WITHOUT COMPLICATION, WITHOUT LONG-TERM CURRENT USE OF INSULIN (HCC): Primary | ICD-10-CM

## 2020-11-24 ENCOUNTER — RESULTS ENCOUNTER (OUTPATIENT)
Dept: ENDOCRINOLOGY | Age: 80
End: 2020-11-24

## 2020-11-24 DIAGNOSIS — E78.5 HYPERLIPIDEMIA, UNSPECIFIED HYPERLIPIDEMIA TYPE: ICD-10-CM

## 2020-11-24 DIAGNOSIS — E11.9 TYPE 2 DIABETES MELLITUS WITHOUT COMPLICATION, WITHOUT LONG-TERM CURRENT USE OF INSULIN (HCC): ICD-10-CM

## 2020-11-24 DIAGNOSIS — I10 ESSENTIAL HYPERTENSION: ICD-10-CM

## 2020-11-24 LAB
ALBUMIN SERPL-MCNC: 4.2 G/DL (ref 3.5–5.2)
ALBUMIN/GLOB SERPL: 1.7 G/DL
ALP SERPL-CCNC: 104 U/L (ref 39–117)
ALT SERPL-CCNC: 25 U/L (ref 1–41)
AST SERPL-CCNC: 26 U/L (ref 1–40)
BILIRUB SERPL-MCNC: 1.2 MG/DL (ref 0–1.2)
BUN SERPL-MCNC: 15 MG/DL (ref 8–23)
BUN/CREAT SERPL: 16.1 (ref 7–25)
CALCIUM SERPL-MCNC: 9.1 MG/DL (ref 8.6–10.5)
CHLORIDE SERPL-SCNC: 100 MMOL/L (ref 98–107)
CHOLEST SERPL-MCNC: 103 MG/DL (ref 0–200)
CO2 SERPL-SCNC: 27.9 MMOL/L (ref 22–29)
CREAT SERPL-MCNC: 0.93 MG/DL (ref 0.76–1.27)
GLOBULIN SER CALC-MCNC: 2.5 GM/DL
GLUCOSE SERPL-MCNC: 203 MG/DL (ref 65–99)
HBA1C MFR BLD: 8.5 % (ref 4.8–5.6)
HDLC SERPL-MCNC: 36 MG/DL (ref 40–60)
INTERPRETATION: NORMAL
LDLC SERPL CALC-MCNC: 39 MG/DL (ref 0–100)
Lab: NORMAL
POTASSIUM SERPL-SCNC: 4.9 MMOL/L (ref 3.5–5.2)
PROT SERPL-MCNC: 6.7 G/DL (ref 6–8.5)
SODIUM SERPL-SCNC: 133 MMOL/L (ref 136–145)
TRIGL SERPL-MCNC: 165 MG/DL (ref 0–150)
VLDLC SERPL CALC-MCNC: 28 MG/DL (ref 5–40)

## 2020-12-19 ENCOUNTER — RESULTS ENCOUNTER (OUTPATIENT)
Dept: ENDOCRINOLOGY | Age: 80
End: 2020-12-19

## 2020-12-19 DIAGNOSIS — R68.89 ABNORMAL ENDOCRINE LABORATORY TEST FINDING: ICD-10-CM

## 2021-01-08 ENCOUNTER — OFFICE VISIT (OUTPATIENT)
Dept: FAMILY MEDICINE CLINIC | Facility: CLINIC | Age: 81
End: 2021-01-08

## 2021-01-08 VITALS
BODY MASS INDEX: 37.49 KG/M2 | WEIGHT: 225 LBS | HEIGHT: 65 IN | DIASTOLIC BLOOD PRESSURE: 68 MMHG | SYSTOLIC BLOOD PRESSURE: 126 MMHG | HEART RATE: 84 BPM | OXYGEN SATURATION: 97 %

## 2021-01-08 DIAGNOSIS — I48.0 PAROXYSMAL ATRIAL FIBRILLATION (HCC): ICD-10-CM

## 2021-01-08 DIAGNOSIS — E11.9 TYPE 2 DIABETES MELLITUS WITHOUT COMPLICATION, WITHOUT LONG-TERM CURRENT USE OF INSULIN (HCC): ICD-10-CM

## 2021-01-08 DIAGNOSIS — Z00.00 MEDICARE ANNUAL WELLNESS VISIT, SUBSEQUENT: Primary | ICD-10-CM

## 2021-01-08 DIAGNOSIS — I10 ESSENTIAL HYPERTENSION: ICD-10-CM

## 2021-01-08 DIAGNOSIS — H61.23 BILATERAL IMPACTED CERUMEN: ICD-10-CM

## 2021-01-08 DIAGNOSIS — I25.10 ASCVD (ARTERIOSCLEROTIC CARDIOVASCULAR DISEASE): ICD-10-CM

## 2021-01-08 DIAGNOSIS — E78.5 HYPERLIPIDEMIA, UNSPECIFIED HYPERLIPIDEMIA TYPE: ICD-10-CM

## 2021-01-08 PROCEDURE — G0439 PPPS, SUBSEQ VISIT: HCPCS | Performed by: FAMILY MEDICINE

## 2021-01-08 NOTE — PATIENT INSTRUCTIONS
Advance Directive    Advance directives are legal documents that let you make choices ahead of time about your health care and medical treatment in case you become unable to communicate for yourself. Advance directives are a way for you to make known your wishes to family, friends, and health care providers. This can let others know about your end-of-life care if you become unable to communicate.  Discussing and writing advance directives should happen over time rather than all at once. Advance directives can be changed depending on your situation and what you want, even after you have signed the advance directives.  There are different types of advance directives, such as:  · Medical power of .  · Living will.  · Do not resuscitate (DNR) or do not attempt resuscitation (DNAR) order.  Health care proxy and medical power of   A health care proxy is also called a health care agent. This is a person who is appointed to make medical decisions for you in cases where you are unable to make the decisions yourself. Generally, people choose someone they know well and trust to represent their preferences. Make sure to ask this person for an agreement to act as your proxy. A proxy may have to exercise judgment in the event of a medical decision for which your wishes are not known.  A medical power of  is a legal document that names your health care proxy. Depending on the laws in your state, after the document is written, it may also need to be:  · Signed.  · Notarized.  · Dated.  · Copied.  · Witnessed.  · Incorporated into your medical record.  You may also want to appoint someone to manage your money in a situation in which you are unable to do so. This is called a durable power of  for finances. It is a separate legal document from the durable power of  for health care. You may choose the same person or someone different from your health care proxy to act as your agent in money  matters.  If you do not appoint a proxy, or if there is a concern that the proxy is not acting in your best interests, a court may appoint a guardian to act on your behalf.  Living will  A living will is a set of instructions that state your wishes about medical care when you cannot express them yourself. Health care providers should keep a copy of your living will in your medical record. You may want to give a copy to family members or friends. To alert caregivers in case of an emergency, you can place a card in your wallet to let them know that you have a living will and where they can find it. A living will is used if you become:  · Terminally ill.  · Disabled.  · Unable to communicate or make decisions.  Items to consider in your living will include:  · To use or not to use life-support equipment, such as dialysis machines and breathing machines (ventilators).  · A DNR or DNAR order. This tells health care providers not to use cardiopulmonary resuscitation (CPR) if breathing or heartbeat stops.  · To use or not to use tube feeding.  · To be given or not to be given food and fluids.  · Comfort (palliative) care when the goal becomes comfort rather than a cure.  · Donation of organs and tissues.  A living will does not give instructions for distributing your money and property if you should pass away.  DNR or DNAR  A DNR or DNAR order is a request not to have CPR in the event that your heart stops beating or you stop breathing. If a DNR or DNAR order has not been made and shared, a health care provider will try to help any patient whose heart has stopped or who has stopped breathing. If you plan to have surgery, talk with your health care provider about how your DNR or DNAR order will be followed if problems occur.  What if I do not have an advance directive?  If you do not have an advance directive, some states assign family decision makers to act on your behalf based on how closely you are related to them. Each  state has its own laws about advance directives. You may want to check with your health care provider, , or state representative about the laws in your state.  Summary  · Advance directives are the legal documents that allow you to make choices ahead of time about your health care and medical treatment in case you become unable to tell others about your care.  · The process of discussing and writing advance directives should happen over time. You can change the advance directives, even after you have signed them.  · Advance directives include DNR or DNAR orders, living ortega, and designating an agent as your medical power of .  This information is not intended to replace advice given to you by your health care provider. Make sure you discuss any questions you have with your health care provider.  Document Revised: 07/16/2020 Document Reviewed: 07/16/2020  [employer] Patient Education © 2020 [employer]      Calorie Counting for Weight Loss  Calories are units of energy. Your body needs a certain amount of calories from food to keep you going throughout the day. When you eat more calories than your body needs, your body stores the extra calories as fat. When you eat fewer calories than your body needs, your body burns fat to get the energy it needs.  Calorie counting means keeping track of how many calories you eat and drink each day. Calorie counting can be helpful if you need to lose weight. If you make sure to eat fewer calories than your body needs, you should lose weight. Ask your health care provider what a healthy weight is for you.  For calorie counting to work, you will need to eat the right number of calories in a day in order to lose a healthy amount of weight per week. A dietitian can help you determine how many calories you need in a day and will give you suggestions on how to reach your calorie goal.  · A healthy amount of weight to lose per week is usually 1-2 lb (0.5-0.9 kg). This  usually means that your daily calorie intake should be reduced by 500-750 calories.  · Eating 1,200 - 1,500 calories per day can help most women lose weight.  · Eating 1,500 - 1,800 calories per day can help most men lose weight.  What is my plan?  My goal is to have __________ calories per day.  If I have this many calories per day, I should lose around __________ pounds per week.  What do I need to know about calorie counting?  In order to meet your daily calorie goal, you will need to:  · Find out how many calories are in each food you would like to eat. Try to do this before you eat.  · Decide how much of the food you plan to eat.  · Write down what you ate and how many calories it had. Doing this is called keeping a food log.  To successfully lose weight, it is important to balance calorie counting with a healthy lifestyle that includes regular activity. Aim for 150 minutes of moderate exercise (such as walking) or 75 minutes of vigorous exercise (such as running) each week.  Where do I find calorie information?    The number of calories in a food can be found on a Nutrition Facts label. If a food does not have a Nutrition Facts label, try to look up the calories online or ask your dietitian for help.  Remember that calories are listed per serving. If you choose to have more than one serving of a food, you will have to multiply the calories per serving by the amount of servings you plan to eat. For example, the label on a package of bread might say that a serving size is 1 slice and that there are 90 calories in a serving. If you eat 1 slice, you will have eaten 90 calories. If you eat 2 slices, you will have eaten 180 calories.  How do I keep a food log?  Immediately after each meal, record the following information in your food log:  · What you ate. Don't forget to include toppings, sauces, and other extras on the food.  · How much you ate. This can be measured in cups, ounces, or number of items.  · How many  "calories each food and drink had.  · The total number of calories in the meal.  Keep your food log near you, such as in a small notebook in your pocket, or use a mobile lisa or website. Some programs will calculate calories for you and show you how many calories you have left for the day to meet your goal.  What are some calorie counting tips?    · Use your calories on foods and drinks that will fill you up and not leave you hungry:  ? Some examples of foods that fill you up are nuts and nut butters, vegetables, lean proteins, and high-fiber foods like whole grains. High-fiber foods are foods with more than 5 g fiber per serving.  ? Drinks such as sodas, specialty coffee drinks, alcohol, and juices have a lot of calories, yet do not fill you up.  · Eat nutritious foods and avoid empty calories. Empty calories are calories you get from foods or beverages that do not have many vitamins or protein, such as candy, sweets, and soda. It is better to have a nutritious high-calorie food (such as an avocado) than a food with few nutrients (such as a bag of chips).  · Know how many calories are in the foods you eat most often. This will help you calculate calorie counts faster.  · Pay attention to calories in drinks. Low-calorie drinks include water and unsweetened drinks.  · Pay attention to nutrition labels for \"low fat\" or \"fat free\" foods. These foods sometimes have the same amount of calories or more calories than the full fat versions. They also often have added sugar, starch, or salt, to make up for flavor that was removed with the fat.  · Find a way of tracking calories that works for you. Get creative. Try different apps or programs if writing down calories does not work for you.  What are some portion control tips?  · Know how many calories are in a serving. This will help you know how many servings of a certain food you can have.  · Use a measuring cup to measure serving sizes. You could also try weighing out " portions on a kitchen scale. With time, you will be able to estimate serving sizes for some foods.  · Take some time to put servings of different foods on your favorite plates, bowls, and cups so you know what a serving looks like.  · Try not to eat straight from a bag or box. Doing this can lead to overeating. Put the amount you would like to eat in a cup or on a plate to make sure you are eating the right portion.  · Use smaller plates, glasses, and bowls to prevent overeating.  · Try not to multitask (for example, watch TV or use your computer) while eating. If it is time to eat, sit down at a table and enjoy your food. This will help you to know when you are full. It will also help you to be aware of what you are eating and how much you are eating.  What are tips for following this plan?  Reading food labels  · Check the calorie count compared to the serving size. The serving size may be smaller than what you are used to eating.  · Check the source of the calories. Make sure the food you are eating is high in vitamins and protein and low in saturated and trans fats.  Shopping  · Read nutrition labels while you shop. This will help you make healthy decisions before you decide to purchase your food.  · Make a grocery list and stick to it.  Cooking  · Try to cook your favorite foods in a healthier way. For example, try baking instead of frying.  · Use low-fat dairy products.  Meal planning  · Use more fruits and vegetables. Half of your plate should be fruits and vegetables.  · Include lean proteins like poultry and fish.  How do I count calories when eating out?  · Ask for smaller portion sizes.  · Consider sharing an entree and sides instead of getting your own entree.  · If you get your own entree, eat only half. Ask for a box at the beginning of your meal and put the rest of your entree in it so you are not tempted to eat it.  · If calories are listed on the menu, choose the lower calorie options.  · Choose  "dishes that include vegetables, fruits, whole grains, low-fat dairy products, and lean protein.  · Choose items that are boiled, broiled, grilled, or steamed. Stay away from items that are buttered, battered, fried, or served with cream sauce. Items labeled \"crispy\" are usually fried, unless stated otherwise.  · Choose water, low-fat milk, unsweetened iced tea, or other drinks without added sugar. If you want an alcoholic beverage, choose a lower calorie option such as a glass of wine or light beer.  · Ask for dressings, sauces, and syrups on the side. These are usually high in calories, so you should limit the amount you eat.  · If you want a salad, choose a garden salad and ask for grilled meats. Avoid extra toppings like mccray, cheese, or fried items. Ask for the dressing on the side, or ask for olive oil and vinegar or lemon to use as dressing.  · Estimate how many servings of a food you are given. For example, a serving of cooked rice is ½ cup or about the size of half a baseball. Knowing serving sizes will help you be aware of how much food you are eating at restaurants. The list below tells you how big or small some common portion sizes are based on everyday objects:  ? 1 oz--4 stacked dice.  ? 3 oz--1 deck of cards.  ? 1 tsp--1 die.  ? 1 Tbsp--½ a ping-pong ball.  ? 2 Tbsp--1 ping-pong ball.  ? ½ cup--½ baseball.  ? 1 cup--1 baseball.  Summary  · Calorie counting means keeping track of how many calories you eat and drink each day. If you eat fewer calories than your body needs, you should lose weight.  · A healthy amount of weight to lose per week is usually 1-2 lb (0.5-0.9 kg). This usually means reducing your daily calorie intake by 500-750 calories.  · The number of calories in a food can be found on a Nutrition Facts label. If a food does not have a Nutrition Facts label, try to look up the calories online or ask your dietitian for help.  · Use your calories on foods and drinks that will fill you up, and " not on foods and drinks that will leave you hungry.  · Use smaller plates, glasses, and bowls to prevent overeating.  This information is not intended to replace advice given to you by your health care provider. Make sure you discuss any questions you have with your health care provider.  Document Revised: 09/06/2019 Document Reviewed: 11/17/2017  [employer] Patient Education © 2020 [employer]      Exercising to Lose Weight  Exercise is structured, repetitive physical activity to improve fitness and health. Getting regular exercise is important for everyone. It is especially important if you are overweight. Being overweight increases your risk of heart disease, stroke, diabetes, high blood pressure, and several types of cancer. Reducing your calorie intake and exercising can help you lose weight.  Exercise is usually categorized as moderate or vigorous intensity. To lose weight, most people need to do a certain amount of moderate-intensity or vigorous-intensity exercise each week.  Moderate-intensity exercise    Moderate-intensity exercise is any activity that gets you moving enough to burn at least three times more energy (calories) than if you were sitting.  Examples of moderate exercise include:  · Walking a mile in 15 minutes.  · Doing light yard work.  · Biking at an easy pace.  Most people should get at least 150 minutes (2 hours and 30 minutes) a week of moderate-intensity exercise to maintain their body weight.  Vigorous-intensity exercise  Vigorous-intensity exercise is any activity that gets you moving enough to burn at least six times more calories than if you were sitting. When you exercise at this intensity, you should be working hard enough that you are not able to carry on a conversation.  Examples of vigorous exercise include:  · Running.  · Playing a team sport, such as football, basketball, and soccer.  · Jumping rope.  Most people should get at least 75 minutes (1 hour and 15 minutes) a week of  vigorous-intensity exercise to maintain their body weight.  How can exercise affect me?  When you exercise enough to burn more calories than you eat, you lose weight. Exercise also reduces body fat and builds muscle. The more muscle you have, the more calories you burn. Exercise also:  · Improves mood.  · Reduces stress and tension.  · Improves your overall fitness, flexibility, and endurance.  · Increases bone strength.  The amount of exercise you need to lose weight depends on:  · Your age.  · The type of exercise.  · Any health conditions you have.  · Your overall physical ability.  Talk to your health care provider about how much exercise you need and what types of activities are safe for you.  What actions can I take to lose weight?  Nutrition    · Make changes to your diet as told by your health care provider or diet and nutrition specialist (dietitian). This may include:  ? Eating fewer calories.  ? Eating more protein.  ? Eating less unhealthy fats.  ? Eating a diet that includes fresh fruits and vegetables, whole grains, low-fat dairy products, and lean protein.  ? Avoiding foods with added fat, salt, and sugar.  · Drink plenty of water while you exercise to prevent dehydration or heat stroke.  Activity  · Choose an activity that you enjoy and set realistic goals. Your health care provider can help you make an exercise plan that works for you.  · Exercise at a moderate or vigorous intensity most days of the week.  ? The intensity of exercise may vary from person to person. You can tell how intense a workout is for you by paying attention to your breathing and heartbeat. Most people will notice their breathing and heartbeat get faster with more intense exercise.  · Do resistance training twice each week, such as:  ? Push-ups.  ? Sit-ups.  ? Lifting weights.  ? Using resistance bands.  · Getting short amounts of exercise can be just as helpful as long structured periods of exercise. If you have trouble  finding time to exercise, try to include exercise in your daily routine.  ? Get up, stretch, and walk around every 30 minutes throughout the day.  ? Go for a walk during your lunch break.  ? Park your car farther away from your destination.  ? If you take public transportation, get off one stop early and walk the rest of the way.  ? Make phone calls while standing up and walking around.  ? Take the stairs instead of elevators or escalators.  · Wear comfortable clothes and shoes with good support.  · Do not exercise so much that you hurt yourself, feel dizzy, or get very short of breath.  Where to find more information  · [place] Department of Health and Human Services: www.hhs.gov  · Centers for Disease Control and Prevention (CDC): www.cdc.gov  Contact a health care provider:  · Before starting a new exercise program.  · If you have questions or concerns about your weight.  · If you have a medical problem that keeps you from exercising.  Get help right away if you have any of the following while exercising:  · Injury.  · Dizziness.  · Difficulty breathing or shortness of breath that does not go away when you stop exercising.  · Chest pain.  · Rapid heartbeat.  Summary  · Being overweight increases your risk of heart disease, stroke, diabetes, high blood pressure, and several types of cancer.  · Losing weight happens when you burn more calories than you eat.  · Reducing the amount of calories you eat in addition to getting regular moderate or vigorous exercise each week helps you lose weight.  This information is not intended to replace advice given to you by your health care provider. Make sure you discuss any questions you have with your health care provider.  Document Revised: 12/31/2018 Document Reviewed: 12/31/2018  [employer] Patient Education © 2020 [employer]

## 2021-01-08 NOTE — PROGRESS NOTES
The ABCs of the Annual Wellness Visit  Subsequent Medicare Wellness Visit    Chief Complaint   Patient presents with   • Annual Exam     medicare   • Hyperlipidemia   • Hypertension   • Coronary Artery Disease   • Atrial Fibrillation   • Diabetes       Subjective   History of Present Illness:  Bunny Chacon is a 80 y.o. male who presents for a Subsequent Medicare Wellness Visit.  Patient with dm2, arterial htn and copd;  Mgmt dm2, hld and htn by endocrinology;  Has cad mgmt per cardiology;  Has copd, stable and on inhalers, sees pulmonary.  Has a fib, on anticoagulation, mgmt per cardiology    HEALTH RISK ASSESSMENT    Recent Hospitalizations:  No hospitalization(s) within the last year.    Current Medical Providers:  Patient Care Team:  Jose Stevens DO as PCP - General  Michael Pemberton MD as Consulting Physician (Cardiology)  Iram Polanco MD as Consulting Physician (Dermatology)  Mickey Fortune MD as Consulting Physician (Ophthalmology)  Kris Barney MD (Pulmonary Disease)  Lily Ureña MD (Retina Ophthalmology)  Ric Rios MD as Consulting Physician (Endocrinology)  Jeffery Rust MD as Consulting Physician (Pulmonary Disease)    Smoking Status:  Social History     Tobacco Use   Smoking Status Former Smoker   • Packs/day: 1.50   • Years: 50.00   • Pack years: 75.00   Smokeless Tobacco Never Used       Alcohol Consumption:  Social History     Substance and Sexual Activity   Alcohol Use No       Depression Screen:   PHQ-2/PHQ-9 Depression Screening 1/8/2021   Little interest or pleasure in doing things 0   Feeling down, depressed, or hopeless 0   Trouble falling or staying asleep, or sleeping too much 0   Feeling tired or having little energy 0   Poor appetite or overeating 0   Feeling bad about yourself - or that you are a failure or have let yourself or your family down 0   Trouble concentrating on things, such as reading the newspaper or watching  television 0   Moving or speaking so slowly that other people could have noticed. Or the opposite - being so fidgety or restless that you have been moving around a lot more than usual 0   Thoughts that you would be better off dead, or of hurting yourself in some way 0   Total Score 0   If you checked off any problems, how difficult have these problems made it for you to do your work, take care of things at home, or get along with other people? Not difficult at all       Fall Risk Screen:  IVA Fall Risk Assessment was completed, and patient is at LOW risk for falls.Assessment completed on:1/8/2021    Health Habits and Functional and Cognitive Screening:  Functional & Cognitive Status 1/8/2021   Do you have difficulty preparing food and eating? No   Do you have difficulty bathing yourself, getting dressed or grooming yourself? No   Do you have difficulty using the toilet? No   Do you have difficulty moving around from place to place? No   Do you have trouble with steps or getting out of a bed or a chair? No   Current Diet Well Balanced Diet   Dental Exam Up to date   Eye Exam Up to date   Exercise (times per week) 0 times per week   Current Exercises Include No Regular Exercise   Current Exercise Activities Include -   Do you need help using the phone?  No   Are you deaf or do you have serious difficulty hearing?  No   Do you need help with transportation? No   Do you need help shopping? No   Do you need help preparing meals?  No   Do you need help with housework?  No   Do you need help with laundry? No   Do you need help taking your medications? No   Do you need help managing money? No   Do you ever drive or ride in a car without wearing a seat belt? No   Have you felt unusual stress, anger or loneliness in the last month? No   Who do you live with? Spouse   If you need help, do you have trouble finding someone available to you? No   Have you been bothered in the last four weeks by sexual problems? No   Do you  have difficulty concentrating, remembering or making decisions? No         Does the patient have evidence of cognitive impairment? No    Asprin use counseling:Taking ASA appropriately as indicated    Age-appropriate Screening Schedule:  Refer to the list below for future screening recommendations based on patient's age, sex and/or medical conditions. Orders for these recommended tests are listed in the plan section. The patient has been provided with a written plan.    Health Maintenance   Topic Date Due   • DIABETIC FOOT EXAM  02/03/2021   • HEMOGLOBIN A1C  05/23/2021   • URINE MICROALBUMIN  08/19/2021   • DIABETIC EYE EXAM  09/10/2021   • LIPID PANEL  11/23/2021   • COLONOSCOPY  01/04/2028   • TDAP/TD VACCINES (3 - Td) 03/03/2028   • INFLUENZA VACCINE  Completed          The following portions of the patient's history were reviewed and updated as appropriate: allergies, current medications, past family history, past medical history, past social history, past surgical history and problem list.    Outpatient Medications Prior to Visit   Medication Sig Dispense Refill   • Anoro Ellipta 62.5-25 MCG/INH aerosol powder  inhaler Inhale 1 puff Daily.     • aspirin 81 MG tablet Take 81 mg by mouth daily.     • enalapril (VASOTEC) 2.5 MG tablet Take 1 tablet by mouth Daily. 90 tablet 3   • furosemide (LASIX) 40 MG tablet Take 1 tablet by mouth Daily. 90 tablet 3   • glucose blood (OneTouch Ultra) test strip Dx code E11.9 USE AS DIRECTED TO TEST BLOOD SUGAR 5 TIMES DAILY 450 each 1   • insulin NPH (NovoLIN N ReliOn) 100 UNIT/ML injection INJECT 46 UNITS INTO THE SKIN BEFORE BREAKFAST AND INJECT 10 UNITS AT BEDTIME 50 mL 1   • Insulin Pen Needle ([employer] PEN NEEDLES) 31G X 6 MM misc USE AS DIRECTED.     • insulin regular (NovoLIN R ReliOn) 100 UNIT/ML injection INJECT 10 UNITS INTO THE SKIN BEFORE BREAKFAST AND INJECT 12 UNITS BEFORE SUPPER 30 mL 1   • metoprolol tartrate (LOPRESSOR) 50 MG tablet Take 1 tablet by mouth Every  12 (Twelve) Hours. 180 tablet 3   • Multiple Vitamin (MULTI VITAMIN DAILY) tablet Take 1 tablet by mouth daily.     • multivitamin-minerals (OCUVITE) tablet Take 1 tablet by mouth 2 (two) times a day.     • Omega-3 Fatty Acids (GNP FISH OIL) 1000 MG capsule Take 1 capsule by mouth daily.     • ONETOUCH DELICA LANCETS FINE misc      • simvastatin (ZOCOR) 80 MG tablet Take 1 tablet by mouth Daily. 90 tablet 3   • umeclidinium-vilanterol (ANORO ELLIPTA) 62.5-25 MCG/INH aerosol powder  inhaler Inhale Daily.     • warfarin (COUMADIN) 7.5 MG tablet Take 1 tablet by mouth Daily. 1 1/2 tablet tues & thur 1 tablet the rest of the week       No facility-administered medications prior to visit.        Patient Active Problem List   Diagnosis   • Type 2 diabetes mellitus without complication, without long-term current use of insulin (CMS/[facility])   • Hyperlipidemia   • Essential hypertension   • CAD (coronary artery disease)   • Sleep apnea   • Paroxysmal atrial fibrillation (CMS/[facility])   • Macular degeneration   • History of atrial fibrillation   • ASCVD (arteriosclerotic cardiovascular disease)   • Diabetic eye exam (CMS/[facility])   • Long term current use of anticoagulant       Advanced Care Planning:  ACP discussion was held with the patient during this visit. Patient does not have an advance directive, information provided.    Review of Systems   Respiratory: Negative for shortness of breath.    Cardiovascular: Negative for chest pain and palpitations.   Gastrointestinal: Negative for abdominal pain, nausea and vomiting.       Compared to one year ago, the patient feels his physical health is the same.  Compared to one year ago, the patient feels his mental health is the same.    Reviewed chart for potential of high risk medication in the elderly: yes  Reviewed chart for potential of harmful drug interactions in the elderly:yes    Objective         Vitals:    01/08/21 1435   BP: 126/68   Pulse: 84   SpO2: 97%   Weight: 102 kg (225 lb)  "  Height: 165.1 cm (65\")   PainSc: 0-No pain       Body mass index is 37.44 kg/m².  Discussed the patient's BMI with him. The BMI is above average; BMI management plan is completed.    Physical Exam  Vitals signs and nursing note reviewed.   Constitutional:       Appearance: He is well-developed.   HENT:      Head: Normocephalic and atraumatic.   Neck:      Musculoskeletal: Neck supple.      Thyroid: No thyromegaly.      Vascular: No JVD.   Cardiovascular:      Rate and Rhythm: Normal rate and regular rhythm.      Heart sounds: Normal heart sounds. No murmur. No friction rub. No gallop.    Pulmonary:      Effort: Pulmonary effort is normal. No respiratory distress.      Breath sounds: Normal breath sounds. No wheezing or rales.   Abdominal:      General: Bowel sounds are normal. There is no distension.      Palpations: Abdomen is soft.      Tenderness: There is no abdominal tenderness. There is no guarding or rebound.   Skin:     General: Skin is warm and dry.   Neurological:      Mental Status: He is alert.   Psychiatric:         Behavior: Behavior normal.         Lab Results   Component Value Date     (H) 11/23/2020    CHLPL 103 11/23/2020    TRIG 165 (H) 11/23/2020    HDL 36 (L) 11/23/2020    LDL 39 11/23/2020    VLDL 28 11/23/2020    HGBA1C 8.50 (H) 11/23/2020        Assessment/Plan   Medicare Risks and Personalized Health Plan  CMS Preventative Services Quick Reference  Obesity/Overweight     The above risks/problems have been discussed with the patient.  Pertinent information has been shared with the patient in the After Visit Summary.  Follow up plans and orders are seen below in the Assessment/Plan Section.    Diagnoses and all orders for this visit:    1. Medicare annual wellness visit, subsequent (Primary)    2. ASCVD (arteriosclerotic cardiovascular disease)    3. Essential hypertension    4. Paroxysmal atrial fibrillation (CMS/[facility])    5. Type 2 diabetes mellitus without complication, without " long-term current use of insulin (CMS/[facility])    6. Hyperlipidemia, unspecified hyperlipidemia type    7. Bilateral impacted cerumen  -     carbamide peroxide (Debrox) 6.5 % otic solution; Administer 5 drops into both ears Every Night.  Dispense: 22 mL; Refill: 5      Follow Up:  Return in about 1 year (around 1/8/2022), or if symptoms worsen or fail to improve.     An After Visit Summary and PPPS were given to the patient.

## 2021-01-15 DIAGNOSIS — Z79.4 TYPE 2 DIABETES MELLITUS WITH COMPLICATION, WITH LONG-TERM CURRENT USE OF INSULIN (HCC): ICD-10-CM

## 2021-01-15 DIAGNOSIS — I10 ESSENTIAL HYPERTENSION: ICD-10-CM

## 2021-01-15 DIAGNOSIS — E11.8 TYPE 2 DIABETES MELLITUS WITH COMPLICATION, WITH LONG-TERM CURRENT USE OF INSULIN (HCC): ICD-10-CM

## 2021-01-15 DIAGNOSIS — I25.10 CORONARY ARTERIOSCLEROSIS IN NATIVE ARTERY: ICD-10-CM

## 2021-01-15 DIAGNOSIS — I48.0 PAROXYSMAL ATRIAL FIBRILLATION (HCC): ICD-10-CM

## 2021-01-18 RX ORDER — FUROSEMIDE 40 MG/1
TABLET ORAL
Qty: 90 TABLET | Refills: 3 | Status: SHIPPED | OUTPATIENT
Start: 2021-01-18 | End: 2022-01-11 | Stop reason: SDUPTHER

## 2021-01-18 RX ORDER — SIMVASTATIN 80 MG
TABLET ORAL
Qty: 90 TABLET | Refills: 3 | Status: SHIPPED | OUTPATIENT
Start: 2021-01-18 | End: 2022-01-11 | Stop reason: SDUPTHER

## 2021-01-18 RX ORDER — ENALAPRIL MALEATE 2.5 MG/1
TABLET ORAL
Qty: 90 TABLET | Refills: 3 | Status: SHIPPED | OUTPATIENT
Start: 2021-01-18 | End: 2022-01-11 | Stop reason: SDUPTHER

## 2021-01-18 RX ORDER — METOPROLOL TARTRATE 50 MG/1
50 TABLET, FILM COATED ORAL EVERY 12 HOURS SCHEDULED
Qty: 180 TABLET | Refills: 3 | Status: SHIPPED | OUTPATIENT
Start: 2021-01-18 | End: 2022-01-11 | Stop reason: SDUPTHER

## 2021-01-18 NOTE — TELEPHONE ENCOUNTER
Caller: Summer De Jesus    Relationship: Emergency Contact    Best call back number: [phone]    Medication needed:   Requested Prescriptions     Pending Prescriptions Disp Refills   • enalapril (VASOTEC) 2.5 MG tablet [Pharmacy Med Name: ENALAPRIL MALEATE 2.5 MG Tablet] 90 tablet 3     Sig: TAKE 1 TABLET EVERY DAY   • furosemide (LASIX) 40 MG tablet [Pharmacy Med Name: FUROSEMIDE 40 MG Tablet] 90 tablet 3     Sig: TAKE 1 TABLET EVERY DAY   • metoprolol tartrate (LOPRESSOR) 50 MG tablet [Pharmacy Med Name: METOPROLOL TARTRATE 50 MG Tablet] 180 tablet 3     Sig: TAKE 1 TABLET BY MOUTH EVERY 12 (TWELVE) HOURS.   • simvastatin (ZOCOR) 80 MG tablet [Pharmacy Med Name: SIMVASTATIN 80 MG Tablet] 90 tablet 3     Sig: TAKE 1 TABLET EVERY DAY       When do you need the refill by: 01/29/2021      Does the patient have less than a 3 day supply:  [] Yes  [x] No    What is the patient's preferred pharmacy: [facility] - [facility] [address] - [phone]  - [phone]

## 2021-02-09 DIAGNOSIS — Z79.4 TYPE 2 DIABETES MELLITUS WITH COMPLICATION, WITH LONG-TERM CURRENT USE OF INSULIN (HCC): ICD-10-CM

## 2021-02-09 DIAGNOSIS — E11.8 TYPE 2 DIABETES MELLITUS WITH COMPLICATION, WITH LONG-TERM CURRENT USE OF INSULIN (HCC): ICD-10-CM

## 2021-02-09 RX ORDER — HUMAN INSULIN 100 [IU]/ML
INJECTION, SUSPENSION SUBCUTANEOUS
Qty: 50 ML | Refills: 1 | Status: SHIPPED | OUTPATIENT
Start: 2021-02-09 | End: 2021-07-28

## 2021-02-09 RX ORDER — HUMAN INSULIN 100 [IU]/ML
INJECTION, SOLUTION SUBCUTANEOUS
Qty: 30 ML | Refills: 1 | Status: SHIPPED | OUTPATIENT
Start: 2021-02-09 | End: 2021-07-28

## 2021-05-28 ENCOUNTER — OFFICE VISIT (OUTPATIENT)
Dept: ENDOCRINOLOGY | Age: 81
End: 2021-05-28

## 2021-05-28 DIAGNOSIS — H35.30 MACULAR DEGENERATION, UNSPECIFIED LATERALITY, UNSPECIFIED TYPE: ICD-10-CM

## 2021-05-28 DIAGNOSIS — I25.10 CORONARY ARTERY DISEASE INVOLVING NATIVE CORONARY ARTERY OF NATIVE HEART WITHOUT ANGINA PECTORIS: ICD-10-CM

## 2021-05-28 DIAGNOSIS — E78.5 HYPERLIPIDEMIA, UNSPECIFIED HYPERLIPIDEMIA TYPE: ICD-10-CM

## 2021-05-28 DIAGNOSIS — G47.33 OBSTRUCTIVE SLEEP APNEA SYNDROME: ICD-10-CM

## 2021-05-28 DIAGNOSIS — I10 ESSENTIAL HYPERTENSION: ICD-10-CM

## 2021-05-28 DIAGNOSIS — E11.9 TYPE 2 DIABETES MELLITUS WITHOUT COMPLICATION, WITHOUT LONG-TERM CURRENT USE OF INSULIN (HCC): Primary | ICD-10-CM

## 2021-05-28 PROCEDURE — 99443 PR PHYS/QHP TELEPHONE EVALUATION 21-30 MIN: CPT | Performed by: INTERNAL MEDICINE

## 2021-05-28 RX ORDER — IPRATROPIUM BROMIDE AND ALBUTEROL SULFATE 2.5; .5 MG/3ML; MG/3ML
3 SOLUTION RESPIRATORY (INHALATION) EVERY 4 HOURS PRN
COMMUNITY

## 2021-06-25 LAB
ALBUMIN SERPL-MCNC: 4.2 G/DL (ref 3.5–5.2)
ALBUMIN/GLOB SERPL: 1.7 G/DL
ALP SERPL-CCNC: 97 U/L (ref 39–117)
ALT SERPL-CCNC: 21 U/L (ref 1–41)
AST SERPL-CCNC: 22 U/L (ref 1–40)
BILIRUB SERPL-MCNC: 1.4 MG/DL (ref 0–1.2)
BUN SERPL-MCNC: 14 MG/DL (ref 8–23)
BUN/CREAT SERPL: 13.9 (ref 7–25)
CALCIUM SERPL-MCNC: 9.1 MG/DL (ref 8.6–10.5)
CHLORIDE SERPL-SCNC: 103 MMOL/L (ref 98–107)
CHOLEST SERPL-MCNC: 97 MG/DL (ref 0–200)
CO2 SERPL-SCNC: 22.9 MMOL/L (ref 22–29)
CREAT SERPL-MCNC: 1.01 MG/DL (ref 0.76–1.27)
GLOBULIN SER CALC-MCNC: 2.5 GM/DL
GLUCOSE SERPL-MCNC: 158 MG/DL (ref 65–99)
HBA1C MFR BLD: 8.1 % (ref 4.8–5.6)
HDLC SERPL-MCNC: 32 MG/DL (ref 40–60)
IMP & REVIEW OF LAB RESULTS: NORMAL
LDLC SERPL CALC-MCNC: 46 MG/DL (ref 0–100)
POTASSIUM SERPL-SCNC: 4.4 MMOL/L (ref 3.5–5.2)
PROT SERPL-MCNC: 6.7 G/DL (ref 6–8.5)
SODIUM SERPL-SCNC: 137 MMOL/L (ref 136–145)
T4 FREE SERPL-MCNC: 1.23 NG/DL (ref 0.93–1.7)
TRIGL SERPL-MCNC: 96 MG/DL (ref 0–150)
TSH SERPL DL<=0.005 MIU/L-ACNC: 1.59 UIU/ML (ref 0.27–4.2)
VLDLC SERPL CALC-MCNC: 19 MG/DL (ref 5–40)

## 2021-07-27 DIAGNOSIS — E11.8 TYPE 2 DIABETES MELLITUS WITH COMPLICATION, WITH LONG-TERM CURRENT USE OF INSULIN (HCC): ICD-10-CM

## 2021-07-27 DIAGNOSIS — Z79.4 TYPE 2 DIABETES MELLITUS WITH COMPLICATION, WITH LONG-TERM CURRENT USE OF INSULIN (HCC): ICD-10-CM

## 2021-07-28 RX ORDER — HUMAN INSULIN 100 [IU]/ML
INJECTION, SUSPENSION SUBCUTANEOUS
Qty: 50 ML | Refills: 1 | Status: SHIPPED | OUTPATIENT
Start: 2021-07-28 | End: 2022-01-04

## 2021-07-28 RX ORDER — HUMAN INSULIN 100 [IU]/ML
INJECTION, SOLUTION SUBCUTANEOUS
Qty: 20 ML | Refills: 0 | Status: SHIPPED | OUTPATIENT
Start: 2021-07-28 | End: 2021-10-06

## 2021-10-05 DIAGNOSIS — E11.8 TYPE 2 DIABETES MELLITUS WITH COMPLICATION, WITH LONG-TERM CURRENT USE OF INSULIN (HCC): ICD-10-CM

## 2021-10-05 DIAGNOSIS — Z79.4 TYPE 2 DIABETES MELLITUS WITH COMPLICATION, WITH LONG-TERM CURRENT USE OF INSULIN (HCC): ICD-10-CM

## 2021-10-06 RX ORDER — HUMAN INSULIN 100 [IU]/ML
INJECTION, SOLUTION SUBCUTANEOUS
Qty: 30 ML | Refills: 1 | Status: SHIPPED | OUTPATIENT
Start: 2021-10-06 | End: 2022-01-04

## 2021-12-06 RX ORDER — BLOOD SUGAR DIAGNOSTIC
STRIP MISCELLANEOUS
Qty: 450 EACH | Refills: 1 | Status: SHIPPED | OUTPATIENT
Start: 2021-12-06 | End: 2022-09-06

## 2022-01-04 DIAGNOSIS — E11.8 TYPE 2 DIABETES MELLITUS WITH COMPLICATION, WITH LONG-TERM CURRENT USE OF INSULIN: ICD-10-CM

## 2022-01-04 DIAGNOSIS — Z79.4 TYPE 2 DIABETES MELLITUS WITH COMPLICATION, WITH LONG-TERM CURRENT USE OF INSULIN: ICD-10-CM

## 2022-01-04 RX ORDER — HUMAN INSULIN 100 [IU]/ML
INJECTION, SOLUTION SUBCUTANEOUS
Qty: 20 ML | Refills: 1 | Status: SHIPPED | OUTPATIENT
Start: 2022-01-04 | End: 2022-02-21 | Stop reason: SDUPTHER

## 2022-01-04 RX ORDER — HUMAN INSULIN 100 [IU]/ML
INJECTION, SUSPENSION SUBCUTANEOUS
Qty: 50 ML | Refills: 1 | Status: SHIPPED | OUTPATIENT
Start: 2022-01-04 | End: 2022-02-21 | Stop reason: SDUPTHER

## 2022-01-17 ENCOUNTER — TELEPHONE (OUTPATIENT)
Dept: FAMILY MEDICINE CLINIC | Facility: CLINIC | Age: 82
End: 2022-01-17

## 2022-01-17 ENCOUNTER — OFFICE VISIT (OUTPATIENT)
Dept: FAMILY MEDICINE CLINIC | Facility: CLINIC | Age: 82
End: 2022-01-17

## 2022-01-17 VITALS — OXYGEN SATURATION: 96 % | HEART RATE: 65 BPM | TEMPERATURE: 97.3 F

## 2022-01-17 DIAGNOSIS — R05.9 COUGH: Primary | ICD-10-CM

## 2022-01-17 PROCEDURE — 99213 OFFICE O/P EST LOW 20 MIN: CPT | Performed by: FAMILY MEDICINE

## 2022-01-17 NOTE — TELEPHONE ENCOUNTER
PATIENT DID SPEAK WITH PROVIDER RONNELL ABOUT GETTING A INFUSION IF HE GETS COVID .     PATIENT WIFE STATED HE AS WELL AS HER IS HAVING COVID SYMPTOMS     PATIENT IS SEEING PROVIDER KATELIN TODAY  PM     WIFE IS SET FOR 2PM AS WELL .     DID NOT SEE IN NOTES FROM PROVIDER RONNELL ABOUT INFUSION   BUT PATIENT DID A TELEHEALTH WITH PROVIDER ON 01/13/2022  AND PROVIDER DISCUSSED WITH PATIENT ABOUT INFUSION IF NEEDED PER THE PATIENT     ANY QUESTIONS PLEASE CALL WIFE OF PATIENT IRENE       [phone]

## 2022-01-17 NOTE — PROGRESS NOTES
Subjective   Bunny Chacon is a 81 y.o. male. Presents today for   Chief Complaint   Patient presents with   • Cough       Cough  This is a recurrent problem. Episode onset: worse since yesterday;  has copd. The problem has been waxing and waning. The problem occurs constantly. The cough is productive of sputum. Associated symptoms include wheezing. Pertinent negatives include no chills, fever, postnasal drip, rhinorrhea or sore throat. He has tried a beta-agonist inhaler for the symptoms. The treatment provided moderate relief. His past medical history is significant for COPD and emphysema.       Review of Systems   Constitutional: Positive for fatigue. Negative for chills and fever.   HENT: Negative for congestion, postnasal drip, rhinorrhea and sore throat.    Respiratory: Positive for cough and wheezing.    Gastrointestinal: Negative for abdominal pain, nausea and vomiting.       Patient Active Problem List   Diagnosis   • Type 2 diabetes mellitus without complication, without long-term current use of insulin ([facility])   • Hyperlipidemia   • Essential hypertension   • CAD (coronary artery disease)   • Sleep apnea   • Paroxysmal atrial fibrillation ([facility])   • Macular degeneration   • History of atrial fibrillation   • ASCVD (arteriosclerotic cardiovascular disease)   • Diabetic eye exam ([facility])   • Long term current use of anticoagulant       Social History     Socioeconomic History   • Marital status:    Tobacco Use   • Smoking status: Former Smoker     Packs/day: 1.50     Years: 50.00     Pack years: 75.00   • Smokeless tobacco: Never Used   Substance and Sexual Activity   • Alcohol use: No   • Drug use: No       No Known Allergies    Current Outpatient Medications on File Prior to Visit   Medication Sig Dispense Refill   • enalapril (VASOTEC) 2.5 MG tablet Take 1 tablet by mouth Daily. 90 tablet 3   • furosemide (LASIX) 40 MG tablet Take 1 tablet by mouth 2 (Two) Times a Day As Needed (leg swelling). 180  tablet 3   • glucose blood (OneTouch Ultra) test strip Dx code E11.9 USE AS DIRECTED TO TEST BLOOD SUGAR 5 TIMES DAILY 450 each 1   • insulin NPH (NovoLIN N ReliOn) 100 UNIT/ML injection INJECT 44 UNITS SUBCUTANEOUSLY BEFORE BREAKFAST AND 10 AT BEDTIME 50 mL 1   • Insulin Pen Needle ([employer] PEN NEEDLES) 31G X 6 MM misc USE AS DIRECTED.     • ipratropium-albuterol (DUO-NEB) 0.5-2.5 mg/3 ml nebulizer Take 3 mL by nebulization Every 4 (Four) Hours As Needed for Wheezing. Use 4 times a day     • metoprolol tartrate (LOPRESSOR) 50 MG tablet Take 1 tablet by mouth Every 12 (Twelve) Hours. 180 tablet 3   • Multiple Vitamin (MULTI VITAMIN DAILY) tablet Take 1 tablet by mouth daily.     • multivitamin-minerals (OCUVITE) tablet Take 1 tablet by mouth 2 (two) times a day.     • NovoLIN R ReliOn 100 UNIT/ML injection INJECT 10 UNITS SUBCUTANEOUSLY BEFORE BREAKFAST AND 12 AT BEDTIME 20 mL 1   • Omega-3 Fatty Acids (GNP FISH OIL) 1000 MG capsule Take 1 capsule by mouth daily.     • ONETOUCH DELICA LANCETS FINE misc      • simvastatin (ZOCOR) 80 MG tablet Take 1 tablet by mouth Daily. 90 tablet 3   • warfarin (COUMADIN) 7.5 MG tablet Take 1 tablet by mouth Daily. 1 1/2 tablet tues & thur 1 tablet the rest of the week       No current facility-administered medications on file prior to visit.       Objective   Vitals:    01/17/22 1327   Pulse: 65   Temp: 97.3 °F (36.3 °C)   SpO2: 96%   PainSc: 0-No pain     There is no height or weight on file to calculate BMI.    Physical Exam  Vitals and nursing note reviewed.   Constitutional:       Appearance: He is well-developed.   HENT:      Head: Normocephalic and atraumatic.   Neck:      Thyroid: No thyromegaly.      Vascular: No JVD.   Cardiovascular:      Rate and Rhythm: Normal rate and regular rhythm.      Heart sounds: Normal heart sounds. No murmur heard.  No friction rub. No gallop.    Pulmonary:      Effort: Pulmonary effort is normal. No respiratory distress.      Breath sounds:  Decreased air movement present. Decreased breath sounds present. No wheezing or rales.   Abdominal:      General: Bowel sounds are normal. There is no distension.      Palpations: Abdomen is soft.      Tenderness: There is no abdominal tenderness. There is no guarding or rebound.   Musculoskeletal:      Cervical back: Neck supple.   Skin:     General: Skin is warm and dry.   Neurological:      Mental Status: He is alert.   Psychiatric:         Behavior: Behavior normal.         Assessment/Plan   Diagnoses and all orders for this visit:    1. Cough (Primary)  -     COVID-19,[facility] ROUTINE, NP/OP SWAB IN TRANSPORT MEDIA OR ESWAB 72 HR TAT - Swab, Anterior nasal; Future  -     COVID-19,[facility] ROUTINE, NP/OP SWAB IN TRANSPORT MEDIA OR ESWAB 72 HR TAT - Swab, Anterior nasal    patient with increase in cough, wife no taste or smell started yesterday for both;  Suspect covid 19; gave verbal permission for monoclonal antibody infusion if is positive;     covid 19 pending  Push fluids and rest  GO ER if difficulty breathing  Quarantine for 10 days from start of symptoms plus no fever for 24 hours without taking medication to reduce fever.           -Follow up: Prn - RTC if worse or no improvement.

## 2022-01-18 LAB
LABCORP SARS-COV-2, NAA 2 DAY TAT: NORMAL
SARS-COV-2 RNA RESP QL NAA+PROBE: NOT DETECTED

## 2022-01-19 NOTE — PROGRESS NOTES
Call results to patient.  Covid 19 negative. His wife was swabbed same time, but still pending (I just looked).  Will keep close eye out for today as hoping back soon.

## 2022-02-18 NOTE — PROGRESS NOTES
Subjective   Bunny Chacon is a 81 y.o. male.       F/u for dm 2, hyperlipidemia, hypertension, sleep apnea / testing bs 5 x day / last dm eye exam 2/11/22 with dr Ureña / last dm foot exam today  with dr Rios           Patient is a 80-year-old male who came in for follow-up     He has type 2 diabetes mellitus and has been on Novolin N 46 and R 10 every AM and R 11 at supper and N 10 q hs.  He checks his blood sugar 5 times a day. He was switched from analog insulin to human insulin to reduce drug costs. He denies severe hypoglycemic episodes.  FBS .  Lunchtime .  Suppertime glucose .  Bedtime glucose .  His last meal was at 10:30 AM.      His last eye examination was in 5/21. He has macular degeneration and had intraocular injection on left eye.  He has peripheral vision on his right eye.  He had previous bilateral cataract surgery . He has microalbuminuria on urine sample taken last 8/20. He is on enalapril. He denies any paresthesias.      He has hyperlipidemia and has been on Zocor 80 mg once a day,  and fish oil 1000 mg once a day. He he has been having intermittent leg cramps.  He denies significant weight change.       He has hypertension and CAD.  He had CABG in 1997 at [facility]. He has been on chronic Coumadin therapy for intermittent atrial fibrillation. ProTime is being monitored by Dr. Pemberton.  He is on enalapril, Lasix and Lopressor.  He denies any bleeding. He denies any chest pain, or palpitations.     He has sleep apnea and uses his CPAP regularly. He wakes up rested. He was diagnosed to have COPD by Dr. Jeffery Rust.  He is on Duoneb nebulizer 5 times per day.     He had 2 Moderna Covid vaccine and had 1 Pfizer COVID vaccine.  He had flu vaccine last fall.     The following portions of the patient's history were reviewed and updated as appropriate: allergies, current medications, past family history, past medical history, past social history, past surgical history  "and problem list.    Review of Systems   Eyes: Positive for visual disturbance.   Respiratory: Negative for shortness of breath.    Cardiovascular: Negative.  Negative for chest pain and palpitations.   Gastrointestinal: Negative.    Endocrine: Negative for cold intolerance and heat intolerance.   Genitourinary: Negative.    Musculoskeletal: Positive for back pain. Negative for myalgias.   Neurological: Negative for numbness.     Objective      Vitals:    02/21/22 1412   BP: 138/80   Pulse: 58   SpO2: 96%   Weight: 98.6 kg (217 lb 6.4 oz)   Height: 167.6 cm (66\")     Physical Exam  Constitutional:       General: He is not in acute distress.     Appearance: Normal appearance. He is obese. He is not ill-appearing, toxic-appearing or diaphoretic.   HENT:      Mouth/Throat:      Pharynx: No oropharyngeal exudate or posterior oropharyngeal erythema.   Eyes:      General: No scleral icterus.        Right eye: No discharge.         Left eye: No discharge.      Extraocular Movements: Extraocular movements intact.      Conjunctiva/sclera: Conjunctivae normal.   Neck:      Vascular: No carotid bruit.   Cardiovascular:      Rate and Rhythm: Regular rhythm.      Pulses: Normal pulses.      Heart sounds: Normal heart sounds.   Pulmonary:      Effort: Pulmonary effort is normal. No respiratory distress.      Breath sounds: Normal breath sounds. No stridor. No rales.   Chest:      Chest wall: No tenderness.   Abdominal:      General: Bowel sounds are normal.      Palpations: Abdomen is soft.      Tenderness: There is no right CVA tenderness or left CVA tenderness.   Musculoskeletal:      Cervical back: Normal range of motion. No rigidity or tenderness.      Right lower leg: Edema present.      Left lower leg: Edema present.      Comments: Right leg more swollen than the left.  No calf tenderness.   Lymphadenopathy:      Cervical: No cervical adenopathy.   Neurological:      General: No focal deficit present.      Mental Status: He " is alert and oriented to person, place, and time.      Comments: Intact light touch in lower extremities.       Office Visit on 01/17/2022   Component Date Value Ref Range Status   • SARS-CoV-2, MIKE 01/17/2022 Not Detected  Not Detected Final    Comment: This nucleic acid amplification test was developed and its performance  characteristics determined by [facility]. Nucleic acid  amplification tests include RT-PCR and TMA. This test has not been  FDA cleared or approved. This test has been authorized by FDA under  an Emergency Use Authorization (EUA). This test is only authorized  for the duration of time the declaration that circumstances exist  justifying the authorization of the emergency use of in vitro  diagnostic tests for detection of SARS-CoV-2 virus and/or diagnosis  of COVID-19 infection under section 564(b)(1) of the Act, 21 U.S.C.  360bbb-3(b) (1), unless the authorization is terminated or revoked  sooner.  When diagnostic testing is negative, the possibility of a false  negative result should be considered in the context of a patient's  recent exposures and the presence of clinical signs and symptoms  consistent with COVID-19. An individual without symptoms of COVID-19  and who is not shedding SARS-CoV-2 virus wo                           uld expect to have a  negative (not detected) result in this assay.     • [facility] SARS-COV-2, MIKE 2 DAY TAT 01/17/2022 Performed   Final     Assessment/Plan   Diagnoses and all orders for this visit:    1. Type 2 diabetes mellitus without complication, without long-term current use of insulin (HCC) (Primary)  -     Comprehensive Metabolic Panel  -     Hemoglobin A1c  -     TSH  -     T4, Free  -     Microalbumin / Creatinine Urine Ratio - Urine, Clean Catch    2. Hyperlipidemia, unspecified hyperlipidemia type  -     Lipid Panel  -     TSH  -     T4, Free    3. Essential hypertension  -     Comprehensive Metabolic Panel    4. Coronary artery disease involving  native coronary artery of native heart without angina pectoris  -     Comprehensive Metabolic Panel  -     Lipid Panel    5. Obstructive sleep apnea syndrome  -     TSH  -     T4, Free    6. History of atrial fibrillation      Continue Novolin N and Novolin R.  Continue Zocor and fish oil.  Continue enalapril, Lasix, Lopressor, Coumadin per cardiologist.  Continue CPAP and DuoNeb.    Copy my note sent to Dr. Jose Setvens.    RTC 4 mos

## 2022-02-21 ENCOUNTER — OFFICE VISIT (OUTPATIENT)
Dept: ENDOCRINOLOGY | Age: 82
End: 2022-02-21

## 2022-02-21 VITALS
OXYGEN SATURATION: 96 % | HEIGHT: 66 IN | WEIGHT: 217.4 LBS | DIASTOLIC BLOOD PRESSURE: 80 MMHG | HEART RATE: 58 BPM | SYSTOLIC BLOOD PRESSURE: 138 MMHG | BODY MASS INDEX: 34.94 KG/M2

## 2022-02-21 DIAGNOSIS — E11.9 TYPE 2 DIABETES MELLITUS WITHOUT COMPLICATION, WITHOUT LONG-TERM CURRENT USE OF INSULIN: Primary | ICD-10-CM

## 2022-02-21 DIAGNOSIS — Z79.4 TYPE 2 DIABETES MELLITUS WITH COMPLICATION, WITH LONG-TERM CURRENT USE OF INSULIN: ICD-10-CM

## 2022-02-21 DIAGNOSIS — Z86.79 HISTORY OF ATRIAL FIBRILLATION: ICD-10-CM

## 2022-02-21 DIAGNOSIS — I25.10 CORONARY ARTERY DISEASE INVOLVING NATIVE CORONARY ARTERY OF NATIVE HEART WITHOUT ANGINA PECTORIS: ICD-10-CM

## 2022-02-21 DIAGNOSIS — E78.5 HYPERLIPIDEMIA, UNSPECIFIED HYPERLIPIDEMIA TYPE: ICD-10-CM

## 2022-02-21 DIAGNOSIS — E11.8 TYPE 2 DIABETES MELLITUS WITH COMPLICATION, WITH LONG-TERM CURRENT USE OF INSULIN: ICD-10-CM

## 2022-02-21 DIAGNOSIS — G47.33 OBSTRUCTIVE SLEEP APNEA SYNDROME: ICD-10-CM

## 2022-02-21 DIAGNOSIS — I10 ESSENTIAL HYPERTENSION: ICD-10-CM

## 2022-02-21 PROCEDURE — 99214 OFFICE O/P EST MOD 30 MIN: CPT | Performed by: INTERNAL MEDICINE

## 2022-02-21 RX ORDER — HUMAN INSULIN 100 [IU]/ML
INJECTION, SOLUTION SUBCUTANEOUS
Qty: 30 ML | Refills: 1 | Status: SHIPPED | OUTPATIENT
Start: 2022-02-21 | End: 2022-07-25 | Stop reason: SDUPTHER

## 2022-02-21 RX ORDER — HUMAN INSULIN 100 [IU]/ML
INJECTION, SUSPENSION SUBCUTANEOUS
Qty: 60 ML | Refills: 2 | Status: SHIPPED | OUTPATIENT
Start: 2022-02-21 | End: 2023-01-25

## 2022-02-22 LAB
ALBUMIN SERPL-MCNC: 4.1 G/DL (ref 3.7–4.7)
ALBUMIN/CREAT UR: 303 MG/G CREAT (ref 0–29)
ALBUMIN/GLOB SERPL: 1.8 {RATIO} (ref 1.2–2.2)
ALP SERPL-CCNC: 136 IU/L (ref 44–121)
ALT SERPL-CCNC: 25 IU/L (ref 0–44)
AST SERPL-CCNC: 28 IU/L (ref 0–40)
BILIRUB SERPL-MCNC: 1.8 MG/DL (ref 0–1.2)
BUN SERPL-MCNC: 19 MG/DL (ref 8–27)
BUN/CREAT SERPL: 17 (ref 10–24)
CALCIUM SERPL-MCNC: 9.2 MG/DL (ref 8.6–10.2)
CHLORIDE SERPL-SCNC: 105 MMOL/L (ref 96–106)
CHOLEST SERPL-MCNC: 106 MG/DL (ref 100–199)
CO2 SERPL-SCNC: 19 MMOL/L (ref 20–29)
CREAT SERPL-MCNC: 1.09 MG/DL (ref 0.76–1.27)
CREAT UR-MCNC: 29.1 MG/DL
GLOBULIN SER CALC-MCNC: 2.3 G/DL (ref 1.5–4.5)
GLUCOSE SERPL-MCNC: 88 MG/DL (ref 65–99)
HBA1C MFR BLD: 9.9 % (ref 4.8–5.6)
HDLC SERPL-MCNC: 37 MG/DL
IMP & REVIEW OF LAB RESULTS: NORMAL
LDLC SERPL CALC-MCNC: 54 MG/DL (ref 0–99)
MICROALBUMIN UR-MCNC: 88.1 UG/ML
POTASSIUM SERPL-SCNC: 4.5 MMOL/L (ref 3.5–5.2)
PROT SERPL-MCNC: 6.4 G/DL (ref 6–8.5)
SODIUM SERPL-SCNC: 139 MMOL/L (ref 134–144)
T4 FREE SERPL-MCNC: 1.2 NG/DL (ref 0.82–1.77)
TRIGL SERPL-MCNC: 68 MG/DL (ref 0–149)
TSH SERPL DL<=0.005 MIU/L-ACNC: 1.61 UIU/ML (ref 0.45–4.5)
VLDLC SERPL CALC-MCNC: 15 MG/DL (ref 5–40)

## 2022-02-27 NOTE — PROGRESS NOTES
Hemoglobin A1c higher at 9.9%.  Offer patient to use freestyle farzad sensor.  Give samples if he agrees.Urine microalbumin elevated.Normal thyroid function tests.LDL 54.  HDL 37.  Triglycerides 68.  Continue Zocor 80 mg/day and fish oil 1000 mg/day

## 2022-07-20 NOTE — PROGRESS NOTES
Subjective   Bunny Chacon is a 81 y.o. male.      F/u for dm 2, hyperlipidemia, hypertension, sleep apnea / testing bs 5 x day / last dm eye exam 2/11/22 / last dm foot exam 2/21/22 with dr Rios          Patient is a 81 year old male who came in for follow-up     He has type 2 diabetes mellitus and has been on Jardiance 10 mg/day, Novolin N 46 and R 10 every AM and R 11 at supper and N 10 q hs.  He occasionally skips taking his bedtime NPH insulin.  He checks his blood sugar 5 times a day. He was switched from analog insulin to human insulin to reduce drug costs. He denies severe hypoglycemic episodes.  He has no early morning hypoglycemia.  He does not want to use a glucose sensor.  Fasting glucose 149-208.  RBS 72 189.   He had lab test done this morning and results are pending.      His last eye examination was in 5/22. He has macular degeneration and had intraocular injection on left eye.  He has peripheral vision on his right eye.  He had previous bilateral cataract surgery . He has microalbuminuria on urine sample taken last 2/22. He is on enalapril. He denies any paresthesias.      He has hyperlipidemia and has been on Zocor 80 mg once a day,  and fish oil 1000 mg once a day. He he has been having intermittent leg cramps.  He denies significant weight change.       He has hypertension and CAD.  He had CABG in [facility] at [facility]. He has been on chronic Coumadin therapy for intermittent atrial fibrillation. ProTime is being monitored by Dr. Pemberton.  He is on Jardiance 10 mg/day, enalapril 2.5 mg/day, Lasix 40 mg twice daily and Lopressor 50 mg twice a day.  He denies any bleeding. He denies any chest pain, or palpitations.     He had an arterial ultrasound done in June 2022 which showed right lower extremity with no evidence of hemodynamically significant arterial stenosis.  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  He had an arterial ultrasound of the lower  "extremities in June 2022 which showed right common iliac artery with greater than 50% stenosis.  He will be followed by Dr. Vasquez     He has sleep apnea and uses his CPAP regularly. He wakes up rested. He was diagnosed to have COPD by Dr. Jeffery Rust.  He is on Duoneb nebulizer 5 times per day.     He had 2 Moderna Covid vaccine and had 2 Pfizer COVID vaccine.        The following portions of the patient's history were reviewed and updated as appropriate: allergies, current medications, past family history, past medical history, past social history, past surgical history and problem list.    Review of Systems   Eyes: Negative for visual disturbance.   Respiratory: Negative for shortness of breath.    Cardiovascular: Negative for chest pain and palpitations.   Gastrointestinal: Negative.    Endocrine: Negative for cold intolerance and heat intolerance.   Genitourinary: Negative.    Musculoskeletal: Negative for myalgias.   Neurological: Negative for numbness.     Vitals:    07/25/22 1232   BP: 112/66   Pulse: 77   Temp: 97.2 °F (36.2 °C)   SpO2: 96%   Weight: 93.2 kg (205 lb 6.4 oz)   Height: 167.6 cm (65.98\")      Objective   Physical Exam  Constitutional:       General: He is not in acute distress.     Appearance: Normal appearance. He is obese. He is not ill-appearing or toxic-appearing.   Eyes:      General: No scleral icterus.        Right eye: No discharge.         Left eye: No discharge.      Extraocular Movements: Extraocular movements intact.      Conjunctiva/sclera: Conjunctivae normal.   Neck:      Vascular: No carotid bruit.   Cardiovascular:      Rate and Rhythm: Normal rate and regular rhythm.      Pulses: Normal pulses.      Heart sounds: Normal heart sounds. No murmur heard.    No friction rub.   Pulmonary:      Breath sounds: Normal breath sounds. No rales.   Chest:      Chest wall: No tenderness.   Abdominal:      General: Bowel sounds are normal.      Palpations: Abdomen is soft.      Tenderness: " There is no right CVA tenderness or left CVA tenderness.   Musculoskeletal:      Right lower leg: No edema.      Left lower leg: No edema.   Lymphadenopathy:      Cervical: No cervical adenopathy.   Skin:     General: Skin is warm and dry.   Neurological:      General: No focal deficit present.      Mental Status: He is alert and oriented to person, place, and time.      Comments: Intact light touch on lower ext   Psychiatric:         Mood and Affect: Mood normal.       Office Visit on 02/21/2022   Component Date Value Ref Range Status   • Glucose 02/21/2022 88  65 - 99 mg/dL Final   • BUN 02/21/2022 19  8 - 27 mg/dL Final   • Creatinine 02/21/2022 1.09  0.76 - 1.27 mg/dL Final    Comment:                **Effective February 28, 2022 [facility]                   reporting the 2021 CKD-EPI creatinine equation that                   estimates kidney function without a race variable.     • eGFR Non  Am 02/21/2022 64  >59 mL/min/1.73 Final   • eGFR African Am 02/21/2022 74  >59 mL/min/1.73 Final    Comment: **In accordance with recommendations from the NKF-ASN Task force,**    [facility] is in the process of updating its eGFR calculation to the    2021 CKD-EPI creatinine equation that estimates kidney function    without a race variable.     • BUN/Creatinine Ratio 02/21/2022 17  10 - 24 Final   • Sodium 02/21/2022 139  134 - 144 mmol/L Final   • Potassium 02/21/2022 4.5  3.5 - 5.2 mmol/L Final   • Chloride 02/21/2022 105  96 - 106 mmol/L Final   • Total CO2 02/21/2022 19 (A) 20 - 29 mmol/L Final   • Calcium 02/21/2022 9.2  8.6 - 10.2 mg/dL Final   • Total Protein 02/21/2022 6.4  6.0 - 8.5 g/dL Final   • Albumin 02/21/2022 4.1  3.7 - 4.7 g/dL Final   • Globulin 02/21/2022 2.3  1.5 - 4.5 g/dL Final   • A/G Ratio 02/21/2022 1.8  1.2 - 2.2 Final   • Total Bilirubin 02/21/2022 1.8 (A) 0.0 - 1.2 mg/dL Final   • Alkaline Phosphatase 02/21/2022 136 (A) 44 - 121 IU/L Final   • AST (SGOT) 02/21/2022 28  0 - 40 IU/L  Final   • ALT (SGPT) 02/21/2022 25  0 - 44 IU/L Final   • Hemoglobin A1C 02/21/2022 9.9 (A) 4.8 - 5.6 % Final    Comment:          Prediabetes: 5.7 - 6.4           Diabetes: >6.4           Glycemic control for adults with diabetes: <7.0     • Total Cholesterol 02/21/2022 106  100 - 199 mg/dL Final   • Triglycerides 02/21/2022 68  0 - 149 mg/dL Final   • HDL Cholesterol 02/21/2022 37 (A) >39 mg/dL Final   • VLDL Cholesterol Mateus 02/21/2022 15  5 - 40 mg/dL Final   • LDL Chol Calc (NIH) 02/21/2022 54  0 - 99 mg/dL Final   • TSH 02/21/2022 1.610  0.450 - 4.500 uIU/mL Final   • Free T4 02/21/2022 1.20  0.82 - 1.77 ng/dL Final   • Creatinine, Urine 02/21/2022 29.1  Not Estab. mg/dL Final   • Microalbumin, Urine 02/21/2022 88.1  Not Estab. ug/mL Final   • Microalbumin/Creatinine Ratio 02/21/2022 303 (A) 0 - 29 mg/g creat Final    Comment:                        Normal:                0 -  29                         Moderately increased: 30 - 300                         Severely increased:       >300     • Interpretation 02/21/2022 Note   Final    Supplemental report is available.     Assessment & Plan   Diagnoses and all orders for this visit:    1. Type 2 diabetes mellitus without complication, without long-term current use of insulin (HCC) (Primary)    2. Hyperlipidemia, unspecified hyperlipidemia type    3. Essential hypertension    4. Coronary artery disease involving native coronary artery of native heart without angina pectoris    5. Obstructive sleep apnea syndrome      Continue Novolin N and Novolin R and Jardiance.  Take bedtime Novolin N regularly.  Continue Zocor 80 mg every evening and fish oil 1000 mg daily.  Continue CPAP.    Copy of my note sent to Dr. Jose Stevens and Dr. Ureña.    RTC 4 mos.

## 2022-07-21 ENCOUNTER — TELEPHONE (OUTPATIENT)
Dept: ENDOCRINOLOGY | Age: 82
End: 2022-07-21

## 2022-07-22 DIAGNOSIS — E78.5 HYPERLIPIDEMIA, UNSPECIFIED HYPERLIPIDEMIA TYPE: ICD-10-CM

## 2022-07-22 DIAGNOSIS — E11.9 TYPE 2 DIABETES MELLITUS WITHOUT COMPLICATION, WITHOUT LONG-TERM CURRENT USE OF INSULIN: Primary | ICD-10-CM

## 2022-07-25 ENCOUNTER — OFFICE VISIT (OUTPATIENT)
Dept: ENDOCRINOLOGY | Age: 82
End: 2022-07-25

## 2022-07-25 ENCOUNTER — LAB (OUTPATIENT)
Dept: ENDOCRINOLOGY | Age: 82
End: 2022-07-25

## 2022-07-25 VITALS
TEMPERATURE: 97.2 F | HEART RATE: 77 BPM | SYSTOLIC BLOOD PRESSURE: 112 MMHG | DIASTOLIC BLOOD PRESSURE: 66 MMHG | WEIGHT: 205.4 LBS | OXYGEN SATURATION: 96 % | HEIGHT: 66 IN | BODY MASS INDEX: 33.01 KG/M2

## 2022-07-25 DIAGNOSIS — E78.5 HYPERLIPIDEMIA, UNSPECIFIED HYPERLIPIDEMIA TYPE: ICD-10-CM

## 2022-07-25 DIAGNOSIS — E11.9 TYPE 2 DIABETES MELLITUS WITHOUT COMPLICATION, WITHOUT LONG-TERM CURRENT USE OF INSULIN: ICD-10-CM

## 2022-07-25 DIAGNOSIS — G47.33 OBSTRUCTIVE SLEEP APNEA SYNDROME: ICD-10-CM

## 2022-07-25 DIAGNOSIS — I25.10 CORONARY ARTERY DISEASE INVOLVING NATIVE CORONARY ARTERY OF NATIVE HEART WITHOUT ANGINA PECTORIS: ICD-10-CM

## 2022-07-25 DIAGNOSIS — I10 ESSENTIAL HYPERTENSION: ICD-10-CM

## 2022-07-25 DIAGNOSIS — E11.9 TYPE 2 DIABETES MELLITUS WITHOUT COMPLICATION, WITHOUT LONG-TERM CURRENT USE OF INSULIN: Primary | ICD-10-CM

## 2022-07-25 PROCEDURE — 99214 OFFICE O/P EST MOD 30 MIN: CPT | Performed by: INTERNAL MEDICINE

## 2022-07-25 RX ORDER — HUMAN INSULIN 100 [IU]/ML
INJECTION, SOLUTION SUBCUTANEOUS
Qty: 30 ML | Refills: 6 | Status: SHIPPED | OUTPATIENT
Start: 2022-07-25

## 2022-07-26 LAB
ALBUMIN SERPL-MCNC: 4.2 G/DL (ref 3.7–4.7)
ALBUMIN/GLOB SERPL: 1.4 {RATIO} (ref 1.2–2.2)
ALP SERPL-CCNC: 117 IU/L (ref 44–121)
ALT SERPL-CCNC: 17 IU/L (ref 0–44)
AST SERPL-CCNC: 19 IU/L (ref 0–40)
BILIRUB SERPL-MCNC: 1.2 MG/DL (ref 0–1.2)
BUN SERPL-MCNC: 19 MG/DL (ref 8–27)
BUN/CREAT SERPL: 17 (ref 10–24)
CALCIUM SERPL-MCNC: 9.3 MG/DL (ref 8.6–10.2)
CHLORIDE SERPL-SCNC: 101 MMOL/L (ref 96–106)
CHOLEST SERPL-MCNC: 114 MG/DL (ref 100–199)
CO2 SERPL-SCNC: 18 MMOL/L (ref 20–29)
CREAT SERPL-MCNC: 1.14 MG/DL (ref 0.76–1.27)
EGFRCR SERPLBLD CKD-EPI 2021: 65 ML/MIN/1.73
GLOBULIN SER CALC-MCNC: 2.9 G/DL (ref 1.5–4.5)
GLUCOSE SERPL-MCNC: 226 MG/DL (ref 65–99)
HBA1C MFR BLD: 8.8 % (ref 4.8–5.6)
HDLC SERPL-MCNC: 38 MG/DL
IMP & REVIEW OF LAB RESULTS: NORMAL
LDLC SERPL CALC-MCNC: 54 MG/DL (ref 0–99)
POTASSIUM SERPL-SCNC: 4.3 MMOL/L (ref 3.5–5.2)
PROT SERPL-MCNC: 7.1 G/DL (ref 6–8.5)
SODIUM SERPL-SCNC: 137 MMOL/L (ref 134–144)
TRIGL SERPL-MCNC: 119 MG/DL (ref 0–149)
TSH SERPL DL<=0.005 MIU/L-ACNC: 1.93 UIU/ML (ref 0.45–4.5)
VLDLC SERPL CALC-MCNC: 22 MG/DL (ref 5–40)

## 2022-12-14 DIAGNOSIS — I48.0 PAROXYSMAL ATRIAL FIBRILLATION: ICD-10-CM

## 2022-12-14 DIAGNOSIS — I25.10 CORONARY ARTERIOSCLEROSIS IN NATIVE ARTERY: ICD-10-CM

## 2022-12-14 DIAGNOSIS — E11.8 TYPE 2 DIABETES MELLITUS WITH COMPLICATION, WITH LONG-TERM CURRENT USE OF INSULIN: ICD-10-CM

## 2022-12-14 DIAGNOSIS — Z79.4 TYPE 2 DIABETES MELLITUS WITH COMPLICATION, WITH LONG-TERM CURRENT USE OF INSULIN: ICD-10-CM

## 2022-12-14 DIAGNOSIS — I10 ESSENTIAL HYPERTENSION: ICD-10-CM

## 2022-12-14 RX ORDER — SIMVASTATIN 80 MG
TABLET ORAL
Qty: 90 TABLET | Refills: 3 | Status: SHIPPED | OUTPATIENT
Start: 2022-12-14

## 2022-12-14 RX ORDER — FUROSEMIDE 40 MG/1
TABLET ORAL
Qty: 180 TABLET | Refills: 3 | Status: SHIPPED | OUTPATIENT
Start: 2022-12-14

## 2022-12-14 RX ORDER — ENALAPRIL MALEATE 2.5 MG/1
TABLET ORAL
Qty: 90 TABLET | Refills: 3 | Status: SHIPPED | OUTPATIENT
Start: 2022-12-14

## 2022-12-14 RX ORDER — METOPROLOL TARTRATE 50 MG/1
TABLET, FILM COATED ORAL
Qty: 180 TABLET | Refills: 3 | Status: SHIPPED | OUTPATIENT
Start: 2022-12-14

## 2023-01-13 ENCOUNTER — OFFICE VISIT (OUTPATIENT)
Dept: FAMILY MEDICINE CLINIC | Facility: CLINIC | Age: 83
End: 2023-01-13
Payer: MEDICARE

## 2023-01-13 VITALS
HEART RATE: 72 BPM | BODY MASS INDEX: 34.66 KG/M2 | SYSTOLIC BLOOD PRESSURE: 126 MMHG | WEIGHT: 208 LBS | HEIGHT: 65 IN | OXYGEN SATURATION: 98 % | DIASTOLIC BLOOD PRESSURE: 68 MMHG

## 2023-01-13 DIAGNOSIS — E78.2 MIXED HYPERLIPIDEMIA: ICD-10-CM

## 2023-01-13 DIAGNOSIS — Z00.00 MEDICARE ANNUAL WELLNESS VISIT, SUBSEQUENT: Primary | ICD-10-CM

## 2023-01-13 DIAGNOSIS — E66.09 CLASS 1 OBESITY DUE TO EXCESS CALORIES WITH SERIOUS COMORBIDITY AND BODY MASS INDEX (BMI) OF 34.0 TO 34.9 IN ADULT: ICD-10-CM

## 2023-01-13 DIAGNOSIS — E11.9 ENCOUNTER FOR DIABETIC FOOT EXAM: ICD-10-CM

## 2023-01-13 DIAGNOSIS — I73.9 PAD (PERIPHERAL ARTERY DISEASE): ICD-10-CM

## 2023-01-13 DIAGNOSIS — I10 ESSENTIAL HYPERTENSION: ICD-10-CM

## 2023-01-13 DIAGNOSIS — I48.0 PAROXYSMAL ATRIAL FIBRILLATION: ICD-10-CM

## 2023-01-13 DIAGNOSIS — I25.10 CORONARY ARTERY DISEASE INVOLVING NATIVE CORONARY ARTERY OF NATIVE HEART WITHOUT ANGINA PECTORIS: ICD-10-CM

## 2023-01-13 DIAGNOSIS — E11.59 TYPE 2 DIABETES MELLITUS WITH OTHER CIRCULATORY COMPLICATION, WITH LONG-TERM CURRENT USE OF INSULIN: ICD-10-CM

## 2023-01-13 DIAGNOSIS — Z79.4 TYPE 2 DIABETES MELLITUS WITH OTHER CIRCULATORY COMPLICATION, WITH LONG-TERM CURRENT USE OF INSULIN: ICD-10-CM

## 2023-01-13 PROBLEM — E66.9 OBESITY: Status: ACTIVE | Noted: 2020-03-05

## 2023-01-13 PROBLEM — R06.02 SOBOE (SHORTNESS OF BREATH ON EXERTION): Status: ACTIVE | Noted: 2020-03-05

## 2023-01-13 PROCEDURE — G0439 PPPS, SUBSEQ VISIT: HCPCS | Performed by: FAMILY MEDICINE

## 2023-01-13 PROCEDURE — 1170F FXNL STATUS ASSESSED: CPT | Performed by: FAMILY MEDICINE

## 2023-01-13 PROCEDURE — 1126F AMNT PAIN NOTED NONE PRSNT: CPT | Performed by: FAMILY MEDICINE

## 2023-01-13 PROCEDURE — 1125F AMNT PAIN NOTED PAIN PRSNT: CPT | Performed by: FAMILY MEDICINE

## 2023-01-13 PROCEDURE — 1159F MED LIST DOCD IN RCRD: CPT | Performed by: FAMILY MEDICINE

## 2023-01-13 PROCEDURE — 1160F RVW MEDS BY RX/DR IN RCRD: CPT | Performed by: FAMILY MEDICINE

## 2023-01-13 NOTE — PATIENT INSTRUCTIONS
Advance Care Planning and Advance Directives     You make decisions on a daily basis - decisions about where you want to live, your career, your home, your life. Perhaps one of the most important decisions you face is your choice for future medical care. Take time to talk with your family and your healthcare team and start planning today.  Advance Care Planning is a process that can help you:  Understand possible future healthcare decisions in light of your own experiences  Reflect on those decision in light of your goals and values  Discuss your decisions with those closest to you and the healthcare professionals that care for you  Make a plan by creating a document that reflects your wishes    Surrogate Decision Maker  In the event of a medical emergency, which has left you unable to communicate or to make your own decisions, you would need someone to make decisions for you.  It is important to discuss your preferences for medical treatment with this person while you are in good health.     Qualities of a surrogate decision maker:  Willing to take on this role and responsibility  Knows what you want for future medical care  Willing to follow your wishes even if they don't agree with them  Able to make difficult medical decisions under stressful circumstances    Advance Directives  These are legal documents you can create that will guide your healthcare team and decision maker(s) when needed. These documents can be stored in the electronic medical record.    Living Will - a legal document to guide your care if you have a terminal condition or a serious illness and are unable to communicate. States vary by statute in document names/types, but most forms may include one or more of the following:        -  Directions regarding life-prolonging treatments        -  Directions regarding artificially provided nutrition/hydration        -  Choosing a healthcare decision maker        -  Direction regarding organ/tissue  "donation    Durable Power of  for Healthcare - this document names an -in-fact to make medical decisions for you, but it may also allow this person to make personal and financial decisions for you. Please seek the advice of an  if you need this type of document.    **Advance Directives are not required and no one may discriminate against you if you do not sign one.    Medical Orders  Many states allow specific forms/orders signed by your physician to record your wishes for medical treatment in your current state of health. This form, signed in personal communication with your physician, addresses resuscitation and other medical interventions that you may or may not want.      For more information or to schedule a time with a [facility] Advance Care Planning Facilitator contact: [facility] or call [phone] and someone will contact you directly.\"[id number]-[phone] [id number]!\"  Healthy Habits Start Early    EAT 5 OR MORE SERVINGS OF VEGETABLES AND FRUITS EVERY DAY.    Help Bunny get three vegetables and two fruits each day. Red, green, yellow, orange...encourage them to try all the colors so they can enjoy different flavors and get more vitamins.    How can I help Bunny do this?  ---------------------------------------------  -BE PATIENT WITH Bunny, remember it may take 10 times before they start to like new food. So, start with small bites and just keep trying.  -Serve at least one vegetable or fruit at every meal. Even try two. Remember, portions do not have to be as big as you think.  -Encourage eating fruits and vegetables instead of drinking them..it's a better way to get fiber and vitamins..so limit the amount of juice to 1/2 cup per day for children 1-6 years and one cup per day for children 7-18 years of age. Try using 1/2 part water and 1/2 part juice.    Spend less than two hours per day watching television and other screen media. Screen media includes video games, movies and " computer use for entertainment.    How can I help Bunny do this?  -Turn off the TV at dinner. Dinner is the best time to hang out with your kids and just talk, learn about their day, and tell them about your day. Your kids have a lot to learn from you and dinner is a great time to share.

## 2023-01-13 NOTE — PROGRESS NOTES
QUICK REFERENCE INFORMATION:  The ABCs of the Annual Wellness Visit    Subsequent Medicare Wellness Visit    HEALTH RISK ASSESSMENT    1940    Recent Hospitalizations:  No hospitalization(s) within the last year..        Current Medical Providers:  Patient Care Team:  Jose Stevens DO as PCP - General  Michael Pemberton MD as Consulting Physician (Cardiology)  Iram Polanco MD as Consulting Physician (Dermatology)  Mickey Fortune MD as Consulting Physician (Ophthalmology)  Kris Barney MD (Pulmonary Disease)  Lily Ureña MD (Retina Ophthalmology)  Ric Rios MD as Consulting Physician (Endocrinology)  Jeffery Rsut MD as Consulting Physician (Pulmonary Disease)        Smoking Status:  Social History     Tobacco Use   Smoking Status Former   • Packs/day: 1.50   • Years: 50.00   • Pack years: 75.00   • Types: Cigarettes   Smokeless Tobacco Never       Alcohol Consumption:  Social History     Substance and Sexual Activity   Alcohol Use No       Depression Screen:   PHQ-2/PHQ-9 Depression Screening 1/13/2023   Retired PHQ-9 Total Score -   Retired Total Score -   Little Interest or Pleasure in Doing Things 0-->not at all   Feeling Down, Depressed or Hopeless 0-->not at all   PHQ-9: Brief Depression Severity Measure Score 0       Health Habits and Functional and Cognitive Screening:  Functional & Cognitive Status 1/13/2023   Do you have difficulty preparing food and eating? No   Do you have difficulty bathing yourself, getting dressed or grooming yourself? No   Do you have difficulty using the toilet? No   Do you have difficulty moving around from place to place? No   Do you have trouble with steps or getting out of a bed or a chair? No   Current Diet Well Balanced Diet   Dental Exam Not up to date   Eye Exam Up to date   Exercise (times per week) 0 times per week   Current Exercises Include No Regular Exercise   Current Exercise Activities Include -   Do you need  help using the phone?  No   Are you deaf or do you have serious difficulty hearing?  No   Do you need help with transportation? No   Do you need help shopping? No   Do you need help preparing meals?  No   Do you need help with housework?  No   Do you need help with laundry? No   Do you need help taking your medications? No   Do you need help managing money? No   Do you ever drive or ride in a car without wearing a seat belt? No   Have you felt unusual stress, anger or loneliness in the last month? No   Who do you live with? Spouse   If you need help, do you have trouble finding someone available to you? No   Have you been bothered in the last four weeks by sexual problems? No   Do you have difficulty concentrating, remembering or making decisions? No           Does the patient have evidence of cognitive impairment? No    Aspirin use counseling: Does not need ASA (and currently is not on it)      Recent Lab Results:  CMP:  Lab Results   Component Value Date    BUN 19 07/25/2022    CREATININE 1.14 07/25/2022    EGFRIFNONA 64 02/21/2022    EGFRIFAFRI 74 02/21/2022    BCR 17 07/25/2022     07/25/2022    K 4.3 07/25/2022    CO2 18 (L) 07/25/2022    CALCIUM 9.3 07/25/2022    PROTENTOTREF 7.1 07/25/2022    ALBUMIN 4.2 07/25/2022    LABGLOBREF 2.9 07/25/2022    LABIL2 1.4 07/25/2022    BILITOT 1.2 07/25/2022    ALKPHOS 117 07/25/2022    AST 19 07/25/2022    ALT 17 07/25/2022     Lipid Panel:  Lab Results   Component Value Date    TRIG 119 07/25/2022    HDL 38 (L) 07/25/2022    VLDL 22 07/25/2022     HbA1c:  Lab Results   Component Value Date    HGBA1C 8.8 (H) 07/25/2022       Visual Acuity:  No results found.    Age-appropriate Screening Schedule:  Refer to the list below for future screening recommendations based on patient's age, sex and/or medical conditions. Orders for these recommended tests are listed in the plan section. The patient has been provided with a written plan.    Health Maintenance   Topic Date Due    • HEMOGLOBIN A1C  01/25/2023   • DIABETIC EYE EXAM  02/11/2023   • URINE MICROALBUMIN  02/21/2023   • LIPID PANEL  07/25/2023   • DIABETIC FOOT EXAM  01/13/2024   • TDAP/TD VACCINES (3 - Td or Tdap) 03/03/2028   • INFLUENZA VACCINE  Completed        Subjective   History of Present Illness    Bunny Chacon is a 82 y.o. male who presents for an Subsequent Wellness Visit.  Patient 81 Y/o male with cad, pad, dm2, htn and hld;  Dm2, hld and htn mgmt per endocrinolcoyg;  Sees vascular after cabg and cardiology for cad, a. Fib and pad;   No cp/soa;  Doing well;  Sees Dr. Rios and wants labs here for convenience for appt 1/25/23 so can discuss at appt.    The following portions of the patient's history were reviewed and updated as appropriate: allergies, current medications, past family history, past medical history, past social history, past surgical history and problem list.    Outpatient Medications Prior to Visit   Medication Sig Dispense Refill   • empagliflozin (JARDIANCE) 10 MG tablet tablet Take 10 mg by mouth Daily.     • enalapril (VASOTEC) 2.5 MG tablet TAKE 1 TABLET EVERY DAY 90 tablet 3   • furosemide (LASIX) 40 MG tablet TAKE 1 TABLET TWICE DAILY AS NEEDED FOR LEG SWELLING 180 tablet 3   • glucose blood (OneTouch Ultra) test strip DX CODE e11.9 USE AS DIRECTED TO TEST BLOOD SUGAR 5 TIMES DAILY 500 each 3   • insulin NPH (NovoLIN N ReliOn) 100 UNIT/ML injection INJECT 46 UNITS SUBCUTANEOUSLY BEFORE BREAKFAST AND 10 AT BEDTIME 60 mL 2   • Insulin Pen Needle 31G X 6 MM misc USE AS DIRECTED.     • insulin regular (NovoLIN R ReliOn) 100 UNIT/ML injection 10 units at breakfast and 11 units at supper.  May take extra 2 to 5 units if blood sugars are above 200. 30 mL 6   • ipratropium-albuterol (DUO-NEB) 0.5-2.5 mg/3 ml nebulizer Take 3 mL by nebulization Every 4 (Four) Hours As Needed for Wheezing. 5 times daily     • metoprolol tartrate (LOPRESSOR) 50 MG tablet TAKE 1 TABLET EVERY 12 HOURS 180 tablet 3   •  Multiple Vitamin (MULTI VITAMIN DAILY) tablet Take 1 tablet by mouth daily.     • multivitamin-minerals (OCUVITE) tablet Take 1 tablet by mouth 2 (two) times a day.     • Omega-3 Fatty Acids (GNP FISH OIL) 1000 MG capsule Take 1 capsule by mouth daily.     • ONETOUCH DELICA LANCETS FINE misc      • simvastatin (ZOCOR) 80 MG tablet TAKE 1 TABLET EVERY DAY 90 tablet 3   • warfarin (COUMADIN) 7.5 MG tablet Take 1 tablet by mouth Daily. 1 1/2 tablet tues & thur 1 tablet the rest of the week       No facility-administered medications prior to visit.       Patient Active Problem List   Diagnosis   • Type 2 diabetes mellitus without complication, without long-term current use of insulin ([facility])   • Hyperlipidemia   • Essential hypertension   • CAD (coronary artery disease)   • Sleep apnea   • Paroxysmal atrial fibrillation ([facility])   • Macular degeneration   • History of atrial fibrillation   • ASCVD (arteriosclerotic cardiovascular disease)   • Diabetic eye exam ([facility])   • Long term current use of anticoagulant   • Obesity   • PAD (peripheral artery disease) ([facility])   • SOBOE (shortness of breath on exertion)       Advance Care Planning:  ACP discussion was held with the patient during this visit. Patient does not have an advance directive, information provided.    Identification of Risk Factors:  Risk factors include: Obesity/Overweight .    Review of Systems   Respiratory: Negative for shortness of breath.    Cardiovascular: Negative for chest pain and palpitations.   Gastrointestinal: Negative for abdominal pain, nausea and vomiting.       Compared to one year ago, the patient feels his physical health is the same.  Compared to one year ago, the patient feels his mental health is the same.    Objective     Physical Exam  Vitals and nursing note reviewed.   Constitutional:       Appearance: He is well-developed.   HENT:      Head: Normocephalic and atraumatic.   Neck:      Thyroid: No thyromegaly.      Vascular: No JVD.  "  Cardiovascular:      Rate and Rhythm: Normal rate and regular rhythm.      Heart sounds: Normal heart sounds. No murmur heard.    No friction rub. No gallop.   Pulmonary:      Effort: Pulmonary effort is normal. No respiratory distress.      Breath sounds: Normal breath sounds. No wheezing or rales.   Abdominal:      General: Bowel sounds are normal. There is no distension.      Palpations: Abdomen is soft.      Tenderness: There is no abdominal tenderness. There is no guarding or rebound.   Musculoskeletal:      Cervical back: Neck supple.   Feet:      Comments: Diabetic foot exam and monofilament completed, see scanned report.      Skin:     General: Skin is warm and dry.   Neurological:      Mental Status: He is alert.   Psychiatric:         Behavior: Behavior normal.         Vitals:    01/13/23 1303   BP: 126/68   Pulse: 72   SpO2: 98%   Weight: 94.3 kg (208 lb)   Height: 165.1 cm (65\")   PainSc: 0-No pain     Body mass index is 34.61 kg/m².    BMI is >= 30 and <35. (Class 1 Obesity). The following options were offered after discussion;: weight loss educational material (shared in after visit summary)      Assessment & Plan   Patient Self-Management and Personalized Health Advice  The patient has been provided with information about: diet and exercise and preventive services including:   · Annual Wellness Visit (AWV).    Visit Diagnoses:    ICD-10-CM ICD-9-CM   1. Medicare annual wellness visit, subsequent  Z00.00 V70.0   2. Type 2 diabetes mellitus with other circulatory complication, with long-term current use of insulin ([facility])  E11.59 250.70    Z79.4 V58.67   3. PAD (peripheral artery disease) ([facility])  I73.9 443.9   4. Paroxysmal atrial fibrillation ([facility])  I48.0 427.31   5. Coronary artery disease involving native coronary artery of native heart without angina pectoris  I25.10 414.01   6. Essential hypertension  I10 401.9   7. Mixed hyperlipidemia  E78.2 272.2   8. Class 1 obesity due to excess calories with " serious comorbidity and body mass index (BMI) of 34.0 to 34.9 in adult  E66.09 278.00    Z68.34 V85.34   9. Encounter for diabetic foot exam (HCC)  E11.9 250.00       Orders Placed This Encounter   Procedures   • Microalbumin / Creatinine Urine Ratio - Urine, Clean Catch     Order Specific Question:   Release to patient     Answer:   Routine Release   • Lipid Panel   • Comprehensive Metabolic Panel     Order Specific Question:   Release to patient     Answer:   Routine Release   • Hemoglobin A1c     Order Specific Question:   Release to patient     Answer:   Routine Release   • TSH     Order Specific Question:   Release to patient     Answer:   Routine Release       Outpatient Encounter Medications as of 1/13/2023   Medication Sig Dispense Refill   • empagliflozin (JARDIANCE) 10 MG tablet tablet Take 10 mg by mouth Daily.     • enalapril (VASOTEC) 2.5 MG tablet TAKE 1 TABLET EVERY DAY 90 tablet 3   • furosemide (LASIX) 40 MG tablet TAKE 1 TABLET TWICE DAILY AS NEEDED FOR LEG SWELLING 180 tablet 3   • glucose blood (OneTouch Ultra) test strip DX CODE e11.9 USE AS DIRECTED TO TEST BLOOD SUGAR 5 TIMES DAILY 500 each 3   • insulin NPH (NovoLIN N ReliOn) 100 UNIT/ML injection INJECT 46 UNITS SUBCUTANEOUSLY BEFORE BREAKFAST AND 10 AT BEDTIME 60 mL 2   • Insulin Pen Needle 31G X 6 MM misc USE AS DIRECTED.     • insulin regular (NovoLIN R ReliOn) 100 UNIT/ML injection 10 units at breakfast and 11 units at supper.  May take extra 2 to 5 units if blood sugars are above 200. 30 mL 6   • ipratropium-albuterol (DUO-NEB) 0.5-2.5 mg/3 ml nebulizer Take 3 mL by nebulization Every 4 (Four) Hours As Needed for Wheezing. 5 times daily     • metoprolol tartrate (LOPRESSOR) 50 MG tablet TAKE 1 TABLET EVERY 12 HOURS 180 tablet 3   • Multiple Vitamin (MULTI VITAMIN DAILY) tablet Take 1 tablet by mouth daily.     • multivitamin-minerals (OCUVITE) tablet Take 1 tablet by mouth 2 (two) times a day.     • Omega-3 Fatty Acids (GNP FISH OIL)  "1000 MG capsule Take 1 capsule by mouth daily.     • ONETOUCH DELICA LANCETS FINE misc      • simvastatin (ZOCOR) 80 MG tablet TAKE 1 TABLET EVERY DAY 90 tablet 3   • warfarin (COUMADIN) 7.5 MG tablet Take 1 tablet by mouth Daily. 1 1/2 tablet tues & thur 1 tablet the rest of the week       No facility-administered encounter medications on file as of 1/13/2023.       Reviewed use of high risk medication in the elderly: yes  Reviewed for potential of harmful drug interactions in the elderly: yesa    Follow Up:  Return in about 1 year (around 1/13/2024), or if symptoms worsen or fail to improve.     An After Visit Summary and PPPS with all of these plans were given to the patient.           ++++++++++++++++++++++++++++++++++++++++++++++++++++++++++++++++++     Chief Complaint   Patient presents with   • Medicare Wellness-subsequent   • Diabetes   • Hyperlipidemia   • Hypertension     HPI    Review of Systems   Cardiovascular: Negative for chest pain and palpitations.   Respiratory: Negative for shortness of breath.    Gastrointestinal: Negative for abdominal pain, nausea and vomiting.       Social History     Tobacco Use   • Smoking status: Former     Packs/day: 1.50     Years: 50.00     Pack years: 75.00     Types: Cigarettes   • Smokeless tobacco: Never   Substance Use Topics   • Alcohol use: No     O:   Vitals:    01/13/23 1303   BP: 126/68   Pulse: 72   SpO2: 98%   Weight: 94.3 kg (208 lb)   Height: 165.1 cm (65\")   PainSc: 0-No pain     Body mass index is 34.61 kg/m².  Vitals and nursing note reviewed.   Constitutional:       Appearance: Well-developed.   HENT:      Head: Normocephalic and atraumatic.   Neck:      Thyroid: No thyromegaly.      Vascular: No JVD.   Pulmonary:      Effort: Pulmonary effort is normal. No respiratory distress.      Breath sounds: Normal breath sounds. No wheezing. No rales.   Cardiovascular:      Normal rate. Regular rhythm. Normal heart sounds.      No gallop. No friction rub. "   Abdominal:      General: Bowel sounds are normal. There is no distension.      Palpations: Abdomen is soft.      Tenderness: There is no abdominal tenderness. There is no guarding or rebound.   Musculoskeletal:      Cervical back: Neck supple. Skin:     General: Skin is warm and dry.   Feet:      Comments: Diabetic foot exam and monofilament completed, see scanned report.      Neurological:      Mental Status: Alert.   Psychiatric:         Behavior: Behavior normal.         Diagnoses and all orders for this visit:    1. Medicare annual wellness visit, subsequent (Primary)    2. Type 2 diabetes mellitus with other circulatory complication, with long-term current use of insulin (HCC)  -     Microalbumin / Creatinine Urine Ratio - Urine, Clean Catch  -     Lipid Panel  -     Comprehensive Metabolic Panel  -     Hemoglobin A1c  -     TSH    3. PAD (peripheral artery disease) (HCC)  -     Lipid Panel  -     Comprehensive Metabolic Panel    4. Paroxysmal atrial fibrillation (HCC)    5. Coronary artery disease involving native coronary artery of native heart without angina pectoris  -     Lipid Panel  -     Comprehensive Metabolic Panel    6. Essential hypertension  -     Comprehensive Metabolic Panel    7. Mixed hyperlipidemia  -     Lipid Panel  -     Comprehensive Metabolic Panel    8. Class 1 obesity due to excess calories with serious comorbidity and body mass index (BMI) of 34.0 to 34.9 in adult    9. Encounter for diabetic foot exam (HCC)        Return in about 1 year (around 1/13/2024), or if symptoms worsen or fail to improve.

## 2023-01-14 LAB
ALBUMIN SERPL-MCNC: 4.5 G/DL (ref 3.6–4.6)
ALBUMIN/CREAT UR: 61 MG/G CREAT (ref 0–29)
ALBUMIN/GLOB SERPL: 1.8 {RATIO} (ref 1.2–2.2)
ALP SERPL-CCNC: 104 IU/L (ref 44–121)
ALT SERPL-CCNC: 19 IU/L (ref 0–44)
AST SERPL-CCNC: 23 IU/L (ref 0–40)
BILIRUB SERPL-MCNC: 1 MG/DL (ref 0–1.2)
BUN SERPL-MCNC: 14 MG/DL (ref 8–27)
BUN/CREAT SERPL: 13 (ref 10–24)
CALCIUM SERPL-MCNC: 9.5 MG/DL (ref 8.6–10.2)
CHLORIDE SERPL-SCNC: 102 MMOL/L (ref 96–106)
CHOLEST SERPL-MCNC: 127 MG/DL (ref 100–199)
CO2 SERPL-SCNC: 22 MMOL/L (ref 20–29)
CREAT SERPL-MCNC: 1.07 MG/DL (ref 0.76–1.27)
CREAT UR-MCNC: 18.8 MG/DL
EGFRCR SERPLBLD CKD-EPI 2021: 70 ML/MIN/1.73
GLOBULIN SER CALC-MCNC: 2.5 G/DL (ref 1.5–4.5)
GLUCOSE SERPL-MCNC: 90 MG/DL (ref 70–99)
HBA1C MFR BLD: 8.9 % (ref 4.8–5.6)
HDLC SERPL-MCNC: 42 MG/DL
LDLC SERPL CALC-MCNC: 64 MG/DL (ref 0–99)
MICROALBUMIN UR-MCNC: 11.4 UG/ML
POTASSIUM SERPL-SCNC: 4.4 MMOL/L (ref 3.5–5.2)
PROT SERPL-MCNC: 7 G/DL (ref 6–8.5)
SODIUM SERPL-SCNC: 142 MMOL/L (ref 134–144)
TRIGL SERPL-MCNC: 118 MG/DL (ref 0–149)
TSH SERPL DL<=0.005 MIU/L-ACNC: 1.3 UIU/ML (ref 0.45–4.5)
VLDLC SERPL CALC-MCNC: 21 MG/DL (ref 5–40)

## 2023-01-25 ENCOUNTER — OFFICE VISIT (OUTPATIENT)
Dept: ENDOCRINOLOGY | Age: 83
End: 2023-01-25
Payer: MEDICARE

## 2023-01-25 VITALS
TEMPERATURE: 96.9 F | SYSTOLIC BLOOD PRESSURE: 106 MMHG | WEIGHT: 206.4 LBS | DIASTOLIC BLOOD PRESSURE: 60 MMHG | BODY MASS INDEX: 34.39 KG/M2 | HEART RATE: 72 BPM | HEIGHT: 65 IN | OXYGEN SATURATION: 93 %

## 2023-01-25 DIAGNOSIS — I73.9 PAD (PERIPHERAL ARTERY DISEASE): ICD-10-CM

## 2023-01-25 DIAGNOSIS — I10 ESSENTIAL HYPERTENSION: ICD-10-CM

## 2023-01-25 DIAGNOSIS — E11.9 TYPE 2 DIABETES MELLITUS WITHOUT COMPLICATION, WITHOUT LONG-TERM CURRENT USE OF INSULIN: Primary | ICD-10-CM

## 2023-01-25 DIAGNOSIS — H35.30 MACULAR DEGENERATION, UNSPECIFIED LATERALITY, UNSPECIFIED TYPE: ICD-10-CM

## 2023-01-25 DIAGNOSIS — E78.2 MIXED HYPERLIPIDEMIA: ICD-10-CM

## 2023-01-25 DIAGNOSIS — G47.33 OBSTRUCTIVE SLEEP APNEA SYNDROME: ICD-10-CM

## 2023-01-25 DIAGNOSIS — I25.10 CORONARY ARTERY DISEASE INVOLVING NATIVE CORONARY ARTERY OF NATIVE HEART WITHOUT ANGINA PECTORIS: ICD-10-CM

## 2023-01-25 PROCEDURE — 99214 OFFICE O/P EST MOD 30 MIN: CPT | Performed by: INTERNAL MEDICINE

## 2023-01-25 RX ORDER — HUMAN INSULIN 100 [IU]/ML
INJECTION, SUSPENSION SUBCUTANEOUS
Qty: 60 ML | Refills: 2 | Status: SHIPPED
Start: 2023-01-25

## 2023-01-25 NOTE — LETTER
January 25, 2023     Jose Stevens DO  [address]  [place] [address]  [facility] [address]    Patient: Bunny Chacon   YOB: 1940   Date of Visit: 1/25/2023       Dear Dr. Rodney DO:    Thank you for referring Bunny Chacon to me for evaluation. Below are the relevant portions of my assessment and plan of care.    If you have questions, please do not hesitate to call me. I look forward to following Bunny along with you.         Sincerely,        Ric Rios MD        CC: MD Lily Gregory MD Richard Wayne Baker, MD Ferenc Pantaleone Nagy, MD Yson, Ric NEWMAN MD  01/25/23 1408  Signed  Subjective   Bunny Chacon is a 82 y.o. male.     History of Present Illness     Patient is a 82 year old male who came in for follow-up     He has type 2 diabetes mellitus and has been on Jardiance 10 mg/day, Novolin N 46 and R 10 every AM and R 11 at supper and N 10 q hs.  He occasionally skips taking his bedtime NPH insulin or occasionally takes it at 4 AM.  He checks his blood sugar 5 times a day. He was switched from analog insulin to human insulin to reduce drug costs. He denies severe hypoglycemic episodes.  He has no early morning hypoglycemia.  He does not want to use a glucose sensor.  Fasting glucose 109-198.  Lunchtime glucose .  Suppertime glucose .  Bedtime glucose .   Hemoglobin A1c done in January 2023 is 8.9%.      His last eye examination was in 5/22. He has macular degeneration and had intraocular injection on left eye.  He has peripheral vision on his right eye.  He had previous bilateral cataract surgery .  He has an appointment with the ophthalmologist next week.  He has microalbuminuria on urine sample taken last January 2023. He is on enalapril. He denies any paresthesias.      He has hyperlipidemia and has been on Zocor 80 mg once a day,  and fish oil 1000 mg once a day. He denies myalgia.  He has no significant weight change since July  2022.  Lipid panel done in January 2023 are as follows: Cholesterol 127.  HDL.  42 LDL 64.  Triglycerides 118.      He has hypertension and CAD.  [facility]. He has been on chronic Coumadin therapy for intermittent atrial fibrillation. ProTime is being monitored by Dr. Pemberton.  He is on Jardiance 10 mg/day, enalapril 2.5 mg/day, Lasix 40 mg twice daily and Lopressor 50 mg twice a day.  He denies any bleeding. He denies any chest pain, or palpitations.      He had an arterial ultrasound done in June 2022 which showed right lower extremity with no evidence of hemodynamically significant arterial stenosis.  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  He had an arterial ultrasound of the lower extremities in June 2022 which showed right common iliac artery with greater than 50% stenosis.  He will have vascular studies next week.     He has sleep apnea and uses his CPAP regularly. He wakes up rested. He was diagnosed to have COPD by Dr. Jeffery Rust.  He is on Duoneb nebulizer 5 times per day.     He had 2 Moderna Covid vaccine and had 2 Pfizer COVID vaccine.       He had flu vaccine in the fall      The following portions of the patient's history were reviewed and updated as appropriate: allergies, current medications, past family history, past medical history, past social history, past surgical history and problem list.    Review of Systems   Eyes: Negative for visual disturbance.   Respiratory: Negative for shortness of breath.    Cardiovascular: Negative for chest pain and palpitations.   Gastrointestinal: Negative for anal bleeding, blood in stool, diarrhea and nausea.   Endocrine: Negative for cold intolerance and heat intolerance.   Genitourinary: Negative.    Musculoskeletal: Positive for arthralgias.   Neurological: Negative for numbness.     Vitals:    01/25/23 1232   BP: 106/60   Pulse: 72   Temp: 96.9 °F (36.1 °C)   TempSrc: Temporal   SpO2: 93%  "  Weight: 93.6 kg (206 lb 6.4 oz)   Height: 165.1 cm (65\")      Objective   Physical Exam  Constitutional:       Appearance: Normal appearance. He is not toxic-appearing or diaphoretic.   Eyes:      General: No scleral icterus.        Right eye: No discharge.         Left eye: No discharge.      Extraocular Movements: Extraocular movements intact.      Conjunctiva/sclera: Conjunctivae normal.   Neck:      Vascular: No carotid bruit.   Cardiovascular:      Rate and Rhythm: Normal rate and regular rhythm.      Heart sounds: Normal heart sounds. No murmur heard.    No friction rub. No gallop.   Pulmonary:      Effort: No respiratory distress.      Breath sounds: Normal breath sounds. No stridor. No rales.   Chest:      Chest wall: No tenderness.   Abdominal:      General: Bowel sounds are normal. There is no distension.      Palpations: Abdomen is soft. There is no mass.      Tenderness: There is no right CVA tenderness or left CVA tenderness.   Musculoskeletal:      Right lower leg: No edema.      Left lower leg: No edema.   Lymphadenopathy:      Cervical: No cervical adenopathy.   Neurological:      Mental Status: He is alert and oriented to person, place, and time.      Comments: Intact light touch on lower ext       Office Visit on 01/13/2023   Component Date Value Ref Range Status   • Creatinine, Urine 01/13/2023 18.8  Not Estab. mg/dL Final   • Microalbumin, Urine 01/13/2023 11.4  Not Estab. ug/mL Final   • Microalbumin/Creatinine Ratio 01/13/2023 61 (H)  0 - 29 mg/g creat Final    Comment:                        Normal:                0 -  29                         Moderately increased: 30 - 300                         Severely increased:       >300     • Total Cholesterol 01/13/2023 127  100 - 199 mg/dL Final   • Triglycerides 01/13/2023 118  0 - 149 mg/dL Final   • HDL Cholesterol 01/13/2023 42  >39 mg/dL Final   • VLDL Cholesterol Mateus 01/13/2023 21  5 - 40 mg/dL Final   • LDL Chol Calc ([facility]) 01/13/2023 64  0 " - 99 mg/dL Final   • Glucose 01/13/2023 90  70 - 99 mg/dL Final   • BUN 01/13/2023 14  8 - 27 mg/dL Final   • Creatinine 01/13/2023 1.07  0.76 - 1.27 mg/dL Final   • EGFR Result 01/13/2023 70  >59 mL/min/1.73 Final   • BUN/Creatinine Ratio 01/13/2023 13  10 - 24 Final   • Sodium 01/13/2023 142  134 - 144 mmol/L Final   • Potassium 01/13/2023 4.4  3.5 - 5.2 mmol/L Final   • Chloride 01/13/2023 102  96 - 106 mmol/L Final   • Total CO2 01/13/2023 22  20 - 29 mmol/L Final   • Calcium 01/13/2023 9.5  8.6 - 10.2 mg/dL Final   • Total Protein 01/13/2023 7.0  6.0 - 8.5 g/dL Final   • Albumin 01/13/2023 4.5  3.6 - 4.6 g/dL Final   • Globulin 01/13/2023 2.5  1.5 - 4.5 g/dL Final   • A/G Ratio 01/13/2023 1.8  1.2 - 2.2 Final   • Total Bilirubin 01/13/2023 1.0  0.0 - 1.2 mg/dL Final   • Alkaline Phosphatase 01/13/2023 104  44 - 121 IU/L Final   • AST (SGOT) 01/13/2023 23  0 - 40 IU/L Final   • ALT (SGPT) 01/13/2023 19  0 - 44 IU/L Final   • Hemoglobin A1C 01/13/2023 8.9 (H)  4.8 - 5.6 % Final    Comment:          Prediabetes: 5.7 - 6.4           Diabetes: >6.4           Glycemic control for adults with diabetes: <7.0     • TSH 01/13/2023 1.300  0.450 - 4.500 uIU/mL Final     Assessment & Plan   Diagnoses and all orders for this visit:    1. Type 2 diabetes mellitus without complication, without long-term current use of insulin (HCC) (Primary)    2. Mixed hyperlipidemia    3. Essential hypertension    4. Coronary artery disease involving native coronary artery of native heart without angina pectoris    5. Obstructive sleep apnea syndrome    6. Macular degeneration, unspecified laterality, unspecified type    7. PAD (peripheral artery disease) (HCC)      Take Novolin N 46 units at 10 AM and 12 units at 10 PM.  Novolin R 10 units at breakfast and 11 units at supper.  Continue Zocor 80 mg/day and fish oil 1000 mg daily.  Continue Jardiance, enalapril, Lasix and metoprolol per Dr. Pemberton.  Continue Coumadin per   Nilay..  Follow-up with Dr. Vasquez.  Continue CPAP.    Copy of my note sent to Dr. Jose Stevens, Dr. Ureña, Dr. Pemberton and Dr. Vasquez.    RTC 6 mos

## 2023-01-25 NOTE — PROGRESS NOTES
Subjective   Bunny Chacon is a 82 y.o. male.     History of Present Illness     Patient is a 82 year old male who came in for follow-up     He has type 2 diabetes mellitus and has been on Jardiance 10 mg/day, Novolin N 46 and R 10 every AM and R 11 at supper and N 10 q hs.  He occasionally skips taking his bedtime NPH insulin or occasionally takes it at 4 AM.  He checks his blood sugar 5 times a day. He was switched from analog insulin to human insulin to reduce drug costs. He denies severe hypoglycemic episodes.  He has no early morning hypoglycemia.  He does not want to use a glucose sensor.  Fasting glucose 109-198.  Lunchtime glucose .  Suppertime glucose .  Bedtime glucose .   Hemoglobin A1c done in January 2023 is 8.9%.      His last eye examination was in 5/22. He has macular degeneration and had intraocular injection on left eye.  He has peripheral vision on his right eye.  He had previous bilateral cataract surgery .  He has an appointment with the ophthalmologist next week.  He has microalbuminuria on urine sample taken last January 2023. He is on enalapril. He denies any paresthesias.      He has hyperlipidemia and has been on Zocor 80 mg once a day,  and fish oil 1000 mg once a day. He denies myalgia.  He has no significant weight change since July 2022.  Lipid panel done in January 2023 are as follows: Cholesterol 127.  HDL.  42 LDL 64.  Triglycerides 118.      He has hypertension and CAD.  He had CABG in 1997 at [facility]. He has been on chronic Coumadin therapy for intermittent atrial fibrillation. ProTime is being monitored by Dr. Pemberton.  He is on Jardiance 10 mg/day, enalapril 2.5 mg/day, Lasix 40 mg twice daily and Lopressor 50 mg twice a day.  He denies any bleeding. He denies any chest pain, or palpitations.      He had an arterial ultrasound done in June 2022 which showed right lower extremity with no evidence of hemodynamically significant arterial stenosis.  The left  "lower extremity with evidence of occlusion in the popliteal artery.  He had an arterial ultrasound of the lower extremities in June 2022 which showed right common iliac artery with greater than 50% stenosis.  He will have vascular studies next week.     He has sleep apnea and uses his CPAP regularly. He wakes up rested. He was diagnosed to have COPD by Dr. Jeffery Rust.  He is on Duoneb nebulizer 5 times per day.     He had 2 Moderna Covid vaccine and had 2 Pfizer COVID vaccine.       He had flu vaccine in the fall      The following portions of the patient's history were reviewed and updated as appropriate: allergies, current medications, past family history, past medical history, past social history, past surgical history and problem list.    Review of Systems   Eyes: Negative for visual disturbance.   Respiratory: Negative for shortness of breath.    Cardiovascular: Negative for chest pain and palpitations.   Gastrointestinal: Negative for anal bleeding, blood in stool, diarrhea and nausea.   Endocrine: Negative for cold intolerance and heat intolerance.   Genitourinary: Negative.    Musculoskeletal: Positive for arthralgias.   Neurological: Negative for numbness.     Vitals:    01/25/23 1232   BP: 106/60   Pulse: 72   Temp: 96.9 °F (36.1 °C)   TempSrc: Temporal   SpO2: 93%   Weight: 93.6 kg (206 lb 6.4 oz)   Height: 165.1 cm (65\")      Objective   Physical Exam  Constitutional:       Appearance: Normal appearance. He is not toxic-appearing or diaphoretic.   Eyes:      General: No scleral icterus.        Right eye: No discharge.         Left eye: No discharge.      Extraocular Movements: Extraocular movements intact.      Conjunctiva/sclera: Conjunctivae normal.   Neck:      Vascular: No carotid bruit.   Cardiovascular:      Rate and Rhythm: Normal rate and regular rhythm.      Heart sounds: Normal heart sounds. No murmur heard.    No friction rub. No gallop.   Pulmonary:      Effort: No respiratory distress.    "   Breath sounds: Normal breath sounds. No stridor. No rales.   Chest:      Chest wall: No tenderness.   Abdominal:      General: Bowel sounds are normal. There is no distension.      Palpations: Abdomen is soft. There is no mass.      Tenderness: There is no right CVA tenderness or left CVA tenderness.   Musculoskeletal:      Right lower leg: No edema.      Left lower leg: No edema.   Lymphadenopathy:      Cervical: No cervical adenopathy.   Neurological:      Mental Status: He is alert and oriented to person, place, and time.      Comments: Intact light touch on lower ext       Office Visit on 01/13/2023   Component Date Value Ref Range Status   • Creatinine, Urine 01/13/2023 18.8  Not Estab. mg/dL Final   • Microalbumin, Urine 01/13/2023 11.4  Not Estab. ug/mL Final   • Microalbumin/Creatinine Ratio 01/13/2023 61 (H)  0 - 29 mg/g creat Final    Comment:                        Normal:                0 -  29                         Moderately increased: 30 - 300                         Severely increased:       >300     • Total Cholesterol 01/13/2023 127  100 - 199 mg/dL Final   • Triglycerides 01/13/2023 118  0 - 149 mg/dL Final   • HDL Cholesterol 01/13/2023 42  >39 mg/dL Final   • VLDL Cholesterol Mateus 01/13/2023 21  5 - 40 mg/dL Final   • LDL Chol Calc ([facility]) 01/13/2023 64  0 - 99 mg/dL Final   • Glucose 01/13/2023 90  70 - 99 mg/dL Final   • BUN 01/13/2023 14  8 - 27 mg/dL Final   • Creatinine 01/13/2023 1.07  0.76 - 1.27 mg/dL Final   • EGFR Result 01/13/2023 70  >59 mL/min/1.73 Final   • BUN/Creatinine Ratio 01/13/2023 13  10 - 24 Final   • Sodium 01/13/2023 142  134 - 144 mmol/L Final   • Potassium 01/13/2023 4.4  3.5 - 5.2 mmol/L Final   • Chloride 01/13/2023 102  96 - 106 mmol/L Final   • Total CO2 01/13/2023 22  20 - 29 mmol/L Final   • Calcium 01/13/2023 9.5  8.6 - 10.2 mg/dL Final   • Total Protein 01/13/2023 7.0  6.0 - 8.5 g/dL Final   • Albumin 01/13/2023 4.5  3.6 - 4.6 g/dL Final   • Globulin  01/13/2023 2.5  1.5 - 4.5 g/dL Final   • A/G Ratio 01/13/2023 1.8  1.2 - 2.2 Final   • Total Bilirubin 01/13/2023 1.0  0.0 - 1.2 mg/dL Final   • Alkaline Phosphatase 01/13/2023 104  44 - 121 IU/L Final   • AST (SGOT) 01/13/2023 23  0 - 40 IU/L Final   • ALT (SGPT) 01/13/2023 19  0 - 44 IU/L Final   • Hemoglobin A1C 01/13/2023 8.9 (H)  4.8 - 5.6 % Final    Comment:          Prediabetes: 5.7 - 6.4           Diabetes: >6.4           Glycemic control for adults with diabetes: <7.0     • TSH 01/13/2023 1.300  0.450 - 4.500 uIU/mL Final     Assessment & Plan   Diagnoses and all orders for this visit:    1. Type 2 diabetes mellitus without complication, without long-term current use of insulin (HCC) (Primary)    2. Mixed hyperlipidemia    3. Essential hypertension    4. Coronary artery disease involving native coronary artery of native heart without angina pectoris    5. Obstructive sleep apnea syndrome    6. Macular degeneration, unspecified laterality, unspecified type    7. PAD (peripheral artery disease) (HCC)      Take Novolin N 46 units at 10 AM and 12 units at 10 PM.  Novolin R 10 units at breakfast and 11 units at supper.  Continue Zocor 80 mg/day and fish oil 1000 mg daily.  Continue Jardiance, enalapril, Lasix and metoprolol per Dr. Pemberton.  Continue Coumadin per Dr Pemberton..  Follow-up with Dr. Vasquez.  Continue CPAP.    Copy of my note sent to Dr. Jose Stevens, Dr. Ureña, Dr. Pemberton and Dr. Vasquez.    RTC 6 mos

## 2023-04-03 ENCOUNTER — TELEPHONE (OUTPATIENT)
Dept: FAMILY MEDICINE CLINIC | Facility: CLINIC | Age: 83
End: 2023-04-03
Payer: MEDICARE

## 2023-04-03 DIAGNOSIS — U07.1 COVID-19 VIRUS DETECTED: Primary | ICD-10-CM

## 2023-04-03 NOTE — TELEPHONE ENCOUNTER
Caller: Summer De Jesus    Relationship to patient: Emergency Contact    Date of positive COVID19 test: PATIENT TESTED POSITIVE LAST NIGHT, 4/2/23    COVID19 symptoms: WIFE STATES PATIENT IS COUGHING, DRAINAGE, FEELS ACHY, FEVER, UPSET STOMACH.    Additional information or concerns: WIFE IS REQUESTING THAT DR. JOHNSON CALL IN THE ANTIVIRAL MEDICATION FOR THE PATIENT.        What is the patients preferred pharmacy: [facility] [address] [facility] [place].

## 2023-04-12 RX ORDER — HUMAN INSULIN 100 [IU]/ML
INJECTION, SUSPENSION SUBCUTANEOUS
Qty: 60 ML | Refills: 0 | Status: SHIPPED | OUTPATIENT
Start: 2023-04-12

## 2023-04-12 RX ORDER — HUMAN INSULIN 100 [IU]/ML
INJECTION, SOLUTION SUBCUTANEOUS
Qty: 30 ML | Refills: 0 | Status: SHIPPED | OUTPATIENT
Start: 2023-04-12

## 2023-04-17 ENCOUNTER — TELEPHONE (OUTPATIENT)
Dept: FAMILY MEDICINE CLINIC | Facility: CLINIC | Age: 83
End: 2023-04-17
Payer: MEDICARE

## 2023-04-17 DIAGNOSIS — F41.0 PANIC ATTACK: Primary | ICD-10-CM

## 2023-04-17 RX ORDER — LORAZEPAM 0.5 MG/1
0.5 TABLET ORAL EVERY 8 HOURS PRN
Qty: 3 TABLET | Refills: 0 | Status: SHIPPED | OUTPATIENT
Start: 2023-04-17

## 2023-04-17 NOTE — TELEPHONE ENCOUNTER
Caller: Summer De Jesus    Relationship: Emergency Contact    Best call back number: [phone]     What medication are you requesting: ANXIETY MEDICATION       If a prescription is needed, what is your preferred pharmacy and phone number:  [facility]/pharmacy #[id number] - [place], [place] - [address] [phone] [facility] [phone]         Additional notes: PATIENTS WIFE STATES THAT HE WILL BE HAVING SURGERY TOMORROW. WAS WANTING SOMETHING CALLED IN BEFORE THEN

## 2023-07-26 ENCOUNTER — OFFICE VISIT (OUTPATIENT)
Dept: ENDOCRINOLOGY | Age: 83
End: 2023-07-26
Payer: MEDICARE

## 2023-07-26 VITALS
HEIGHT: 65 IN | SYSTOLIC BLOOD PRESSURE: 116 MMHG | HEART RATE: 72 BPM | WEIGHT: 203.6 LBS | OXYGEN SATURATION: 94 % | TEMPERATURE: 96.3 F | BODY MASS INDEX: 33.92 KG/M2 | DIASTOLIC BLOOD PRESSURE: 70 MMHG

## 2023-07-26 DIAGNOSIS — Z79.4 TYPE 2 DIABETES MELLITUS WITH COMPLICATION, WITH LONG-TERM CURRENT USE OF INSULIN: Primary | ICD-10-CM

## 2023-07-26 DIAGNOSIS — E78.5 HYPERLIPIDEMIA, UNSPECIFIED HYPERLIPIDEMIA TYPE: ICD-10-CM

## 2023-07-26 DIAGNOSIS — I10 PRIMARY HYPERTENSION: ICD-10-CM

## 2023-07-26 DIAGNOSIS — I73.9 PAD (PERIPHERAL ARTERY DISEASE): ICD-10-CM

## 2023-07-26 DIAGNOSIS — G47.33 OBSTRUCTIVE SLEEP APNEA SYNDROME: ICD-10-CM

## 2023-07-26 DIAGNOSIS — I25.10 CORONARY ARTERY DISEASE INVOLVING NATIVE CORONARY ARTERY OF NATIVE HEART WITHOUT ANGINA PECTORIS: ICD-10-CM

## 2023-07-26 DIAGNOSIS — E11.8 TYPE 2 DIABETES MELLITUS WITH COMPLICATION, WITH LONG-TERM CURRENT USE OF INSULIN: Primary | ICD-10-CM

## 2023-07-26 PROCEDURE — 3078F DIAST BP <80 MM HG: CPT | Performed by: INTERNAL MEDICINE

## 2023-07-26 PROCEDURE — 3074F SYST BP LT 130 MM HG: CPT | Performed by: INTERNAL MEDICINE

## 2023-07-26 PROCEDURE — 99214 OFFICE O/P EST MOD 30 MIN: CPT | Performed by: INTERNAL MEDICINE

## 2023-07-26 NOTE — PROGRESS NOTES
Subjective   Bunny Chacon is a 82 y.o. male.     History of Present Illness       He has type 2 diabetes mellitus and has been on Jardiance 10 mg/day, Novolin N 46 and R 10 every AM and R 11 at supper and N 12 q hs.  He checks his blood sugar 5 times a day. He was switched from analog insulin to human insulin to reduce drug costs. He denies severe hypoglycemic episodes.  He has no early morning hypoglycemia.  He does not want to use a glucose sensor.  Fasting glucose .  RBS .  He has no early morning hypoglycemia.        His last eye examination was in June 2023.  He had vitrectomy twice on the left eye done by Dr. Ureña in 2023. He has macular degeneration and had intraocular injection on left eye.  He has peripheral vision on his right eye.  He had previous bilateral cataract surgery . He has microalbuminuria on urine sample taken last January 2023. He is on enalapril. He denies any paresthesias.      He has hyperlipidemia and has been on Zocor 80 mg once a day,  and fish oil 1000 mg once a day. He denies myalgia.  He has lost 3 pounds since January 2023.      He has hypertension and CAD.  He had CABG in 1997 at [facility]. He has been on chronic Coumadin therapy for intermittent atrial fibrillation. ProTime is being monitored by Dr. Pemberton.  He is on Jardiance 10 mg/day, enalapril 2.5 mg/day, Lasix 40 mg twice daily and Lopressor 50 mg twice a day.  He denies any bleeding. He denies any chest pain, or palpitations.      He had an arterial ultrasound done in June 2022 which showed right lower extremity with no evidence of hemodynamically significant arterial stenosis.  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  He had an arterial ultrasound of the lower extremities in June 2022 which showed right common iliac artery with greater than 50% stenosis.     Arterial ultrasound done in March 2023 showed occlusion of the left popliteal artery and right lower  "extremity with no evidence of significant stenosis.  Left lower extremity left lower extremity has moderate arthrosis has moderate arterial insufficiency at rest with digit pressure 56 mmHg which is adequate for healing.  Right lower extremity shows normal arterial circulation at rest with digit pressure of 89 mmHg.  He follows with Dr. Vasquez.     He has sleep apnea and uses his CPAP regularly. He wakes up rested. He was diagnosed to have COPD by Dr. Jeffery Rust.  He is on Duoneb nebulizer 5 times per day.     He had 2 Moderna Covid vaccine and had 2 Pfizer COVID vaccine.  He had COVID infection once but did not require hospitalization.  He was treated with Paxlovid             The following portions of the patient's history were reviewed and updated as appropriate: allergies, current medications, past family history, past medical history, past social history, past surgical history, and problem list.    Review of Systems   Eyes:  Positive for visual disturbance.   Respiratory:  Negative for shortness of breath.    Cardiovascular:  Negative for chest pain and palpitations.   Gastrointestinal: Negative.    Genitourinary: Negative.    Musculoskeletal:  Negative for myalgias.   Neurological:  Negative for numbness.   Vitals:    07/26/23 1403   BP: 116/70   Pulse: 72   Temp: 96.3 °F (35.7 °C)   TempSrc: Temporal   SpO2: 94%   Weight: 92.4 kg (203 lb 9.6 oz)   Height: 165.1 cm (65\")      Objective   Physical Exam  Constitutional:       General: He is not in acute distress.     Appearance: Normal appearance.   Eyes:      General: No scleral icterus.        Right eye: No discharge.         Left eye: No discharge.      Extraocular Movements: Extraocular movements intact.      Conjunctiva/sclera: Conjunctivae normal.   Cardiovascular:      Rate and Rhythm: Rhythm irregular.      Heart sounds:     No gallop.   Pulmonary:      Effort: No respiratory distress.      Breath sounds: No wheezing or rales.   Abdominal:      General: " Bowel sounds are normal.      Palpations: Abdomen is soft.      Tenderness: There is no right CVA tenderness or left CVA tenderness.   Musculoskeletal:      Right lower leg: No edema.      Left lower leg: No edema.      Comments: Feet warm.  No cyanosis.  Faint dorsalis pedis pulses bilaterally.  No plantar ulcers.   Neurological:      Mental Status: He is alert and oriented to person, place, and time.      Comments: Intact light touch in lower extremities.     Office Visit on 01/13/2023   Component Date Value Ref Range Status    Creatinine, Urine 01/13/2023 18.8  Not Estab. mg/dL Final    Microalbumin, Urine 01/13/2023 11.4  Not Estab. ug/mL Final    Microalbumin/Creatinine Ratio 01/13/2023 61 (H)  0 - 29 mg/g creat Final    Comment:                        Normal:                0 -  29                         Moderately increased: 30 - 300                         Severely increased:       >300      Total Cholesterol 01/13/2023 127  100 - 199 mg/dL Final    Triglycerides 01/13/2023 118  0 - 149 mg/dL Final    HDL Cholesterol 01/13/2023 42  >39 mg/dL Final    VLDL Cholesterol Mateus 01/13/2023 21  5 - 40 mg/dL Final    LDL Chol Calc ([facility]) 01/13/2023 64  0 - 99 mg/dL Final    Glucose 01/13/2023 90  70 - 99 mg/dL Final    BUN 01/13/2023 14  8 - 27 mg/dL Final    Creatinine 01/13/2023 1.07  0.76 - 1.27 mg/dL Final    EGFR Result 01/13/2023 70  >59 mL/min/1.73 Final    BUN/Creatinine Ratio 01/13/2023 13  10 - 24 Final    Sodium 01/13/2023 142  134 - 144 mmol/L Final    Potassium 01/13/2023 4.4  3.5 - 5.2 mmol/L Final    Chloride 01/13/2023 102  96 - 106 mmol/L Final    Total CO2 01/13/2023 22  20 - 29 mmol/L Final    Calcium 01/13/2023 9.5  8.6 - 10.2 mg/dL Final    Total Protein 01/13/2023 7.0  6.0 - 8.5 g/dL Final    Albumin 01/13/2023 4.5  3.6 - 4.6 g/dL Final    Globulin 01/13/2023 2.5  1.5 - 4.5 g/dL Final    A/G Ratio 01/13/2023 1.8  1.2 - 2.2 Final    Total Bilirubin 01/13/2023 1.0  0.0 - 1.2 mg/dL Final    Alkaline  Phosphatase 01/13/2023 104  44 - 121 IU/L Final    AST (SGOT) 01/13/2023 23  0 - 40 IU/L Final    ALT (SGPT) 01/13/2023 19  0 - 44 IU/L Final    Hemoglobin A1C 01/13/2023 8.9 (H)  4.8 - 5.6 % Final    Comment:          Prediabetes: 5.7 - 6.4           Diabetes: >6.4           Glycemic control for adults with diabetes: <7.0      TSH 01/13/2023 1.300  0.450 - 4.500 uIU/mL Final     Assessment & Plan   Diagnoses and all orders for this visit:    1. Type 2 diabetes mellitus with complication, with long-term current use of insulin (Primary)  -     Comprehensive Metabolic Panel  -     Hemoglobin A1c  -     TSH    2. Hyperlipidemia, unspecified hyperlipidemia type  -     Comprehensive Metabolic Panel  -     Lipid Panel  -     TSH    3. Primary hypertension  -     Comprehensive Metabolic Panel    4. Coronary artery disease involving native coronary artery of native heart without angina pectoris  -     Lipid Panel    5. PAD (peripheral artery disease)  -     Lipid Panel    6. Obstructive sleep apnea syndrome  -     TSH      Continue Novolin N and Novolin R.  Continue Zocor 80 mg/day and fish oil 1000 mg/day.  Continue Jardiance, enalapril, Lasix and metoprolol per Dr. Pemberton.  Will defer blood pressure control and treatment of coronary disease to Dr. Pemberton.  Follow-up with Dr. Ureña, Dr. Vasquez, Dr. Pemberton, Dr. Stevens, and Dr. Rust.    Flu vaccine this fall.    RTC 6 mos.

## 2023-07-26 NOTE — LETTER
July 26, 2023     Jose Stevens DO  [address]  [address]  [facility] [address]    Patient: Bunny Chacon   YOB: 1940   Date of Visit: 7/26/2023     Dear Jose Stevens DO:       Thank you for referring Bunny Chacon to me for evaluation. Below are the relevant portions of my assessment and plan of care.    If you have questions, please do not hesitate to call me. I look forward to following Bunny along with you.         Sincerely,        Ric Rios MD        CC: MD Lily Gregory MD Richard Wayne Baker, MD Ferenc Pantaleone Nagy, MD Yson, Ric NEWMAN MD  07/26/23 2100  Sign when Signing Visit  Subjective  Bunny Chacon is a 82 y.o. male.     History of Present Illness       He has type 2 diabetes mellitus and has been on Jardiance 10 mg/day, Novolin N 46 and R 10 every AM and R 11 at supper and N 12 q hs.  He checks his blood sugar 5 times a day. He was switched from analog insulin to human insulin to reduce drug costs. He denies severe hypoglycemic episodes.  He has no early morning hypoglycemia.  He does not want to use a glucose sensor.  Fasting glucose .  RBS .  He has no early morning hypoglycemia.        His last eye examination was in June 2023.  He had vitrectomy twice on the left eye done by Dr. Ureña in 2023. He has macular degeneration and had intraocular injection on left eye.  He has peripheral vision on his right eye.  He had previous bilateral cataract surgery . He has microalbuminuria on urine sample taken last January 2023. He is on enalapril. He denies any paresthesias.      He has hyperlipidemia and has been on Zocor 80 mg once a day,  and fish oil 1000 mg once a day. He denies myalgia.  He has lost 3 pounds since January 2023.      He has hypertension and CAD.  He had CABG in 1997 at [facility] [place]. He has been on chronic Coumadin therapy for intermittent atrial fibrillation. ProTime is being monitored by Dr. Pemberton.   He is on Jardiance 10 mg/day, enalapril 2.5 mg/day, Lasix 40 mg twice daily and Lopressor 50 mg twice a day.  He denies any bleeding. He denies any chest pain, or palpitations.      He had an arterial ultrasound done in June 2022 which showed right lower extremity with no evidence of hemodynamically significant arterial stenosis.  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  He had an arterial ultrasound of the lower extremities in June 2022 which showed right common iliac artery with greater than 50% stenosis.     Arterial ultrasound done in March 2023 showed occlusion of the left popliteal artery and right lower extremity with no evidence of significant stenosis.  Left lower extremity left lower extremity has moderate arthrosis has moderate arterial insufficiency at rest with digit pressure 56 mmHg which is adequate for healing.  Right lower extremity shows normal arterial circulation at rest with digit pressure of 89 mmHg.  He follows with Dr. Vasquez.     He has sleep apnea and uses his CPAP regularly. He wakes up rested. He was diagnosed to have COPD by Dr. Jeffery Rust.  He is on Duoneb nebulizer 5 times per day.     He had 2 Moderna Covid vaccine and had 2 Pfizer COVID vaccine.  He had COVID infection once but did not require hospitalization.  He was treated with Paxlovid             The following portions of the patient's history were reviewed and updated as appropriate: allergies, current medications, past family history, past medical history, past social history, past surgical history, and problem list.    Review of Systems   Eyes:  Positive for visual disturbance.   Respiratory:  Negative for shortness of breath.    Cardiovascular:  Negative for chest pain and palpitations.   Gastrointestinal: Negative.    Genitourinary: Negative.    Musculoskeletal:  Negative for myalgias.   Neurological:  Negative for numbness.   Vitals:    07/26/23 1403   BP: 116/70   Pulse: 72   Temp:  "96.3 °F (35.7 °C)   TempSrc: Temporal   SpO2: 94%   Weight: 92.4 kg (203 lb 9.6 oz)   Height: 165.1 cm (65\")      Objective  Physical Exam  Constitutional:       General: He is not in acute distress.     Appearance: Normal appearance.   Eyes:      General: No scleral icterus.        Right eye: No discharge.         Left eye: No discharge.      Extraocular Movements: Extraocular movements intact.      Conjunctiva/sclera: Conjunctivae normal.   Cardiovascular:      Rate and Rhythm: Rhythm irregular.      Heart sounds:     No gallop.   Pulmonary:      Effort: No respiratory distress.      Breath sounds: No wheezing or rales.   Abdominal:      General: Bowel sounds are normal.      Palpations: Abdomen is soft.      Tenderness: There is no right CVA tenderness or left CVA tenderness.   Musculoskeletal:      Right lower leg: No edema.      Left lower leg: No edema.      Comments: Feet warm.  No cyanosis.  Faint dorsalis pedis pulses bilaterally.  No plantar ulcers.   Neurological:      Mental Status: He is alert and oriented to person, place, and time.      Comments: Intact light touch in lower extremities.     Office Visit on 01/13/2023   Component Date Value Ref Range Status   • Creatinine, Urine 01/13/2023 18.8  Not Estab. mg/dL Final   • Microalbumin, Urine 01/13/2023 11.4  Not Estab. ug/mL Final   • Microalbumin/Creatinine Ratio 01/13/2023 61 (H)  0 - 29 mg/g creat Final    Comment:                        Normal:                0 -  29                         Moderately increased: 30 - 300                         Severely increased:       >300     • Total Cholesterol 01/13/2023 127  100 - 199 mg/dL Final   • Triglycerides 01/13/2023 118  0 - 149 mg/dL Final   • HDL Cholesterol 01/13/2023 42  >39 mg/dL Final   • VLDL Cholesterol Mateus 01/13/2023 21  5 - 40 mg/dL Final   • LDL Chol Calc ([facility]) 01/13/2023 64  0 - 99 mg/dL Final   • Glucose 01/13/2023 90  70 - 99 mg/dL Final   • BUN 01/13/2023 14  8 - 27 mg/dL Final   • " Creatinine 01/13/2023 1.07  0.76 - 1.27 mg/dL Final   • EGFR Result 01/13/2023 70  >59 mL/min/1.73 Final   • BUN/Creatinine Ratio 01/13/2023 13  10 - 24 Final   • Sodium 01/13/2023 142  134 - 144 mmol/L Final   • Potassium 01/13/2023 4.4  3.5 - 5.2 mmol/L Final   • Chloride 01/13/2023 102  96 - 106 mmol/L Final   • Total CO2 01/13/2023 22  20 - 29 mmol/L Final   • Calcium 01/13/2023 9.5  8.6 - 10.2 mg/dL Final   • Total Protein 01/13/2023 7.0  6.0 - 8.5 g/dL Final   • Albumin 01/13/2023 4.5  3.6 - 4.6 g/dL Final   • Globulin 01/13/2023 2.5  1.5 - 4.5 g/dL Final   • A/G Ratio 01/13/2023 1.8  1.2 - 2.2 Final   • Total Bilirubin 01/13/2023 1.0  0.0 - 1.2 mg/dL Final   • Alkaline Phosphatase 01/13/2023 104  44 - 121 IU/L Final   • AST (SGOT) 01/13/2023 23  0 - 40 IU/L Final   • ALT (SGPT) 01/13/2023 19  0 - 44 IU/L Final   • Hemoglobin A1C 01/13/2023 8.9 (H)  4.8 - 5.6 % Final    Comment:          Prediabetes: 5.7 - 6.4           Diabetes: >6.4           Glycemic control for adults with diabetes: <7.0     • TSH 01/13/2023 1.300  0.450 - 4.500 uIU/mL Final     Assessment & Plan  Diagnoses and all orders for this visit:    1. Type 2 diabetes mellitus with complication, with long-term current use of insulin (Primary)  -     Comprehensive Metabolic Panel  -     Hemoglobin A1c  -     TSH    2. Hyperlipidemia, unspecified hyperlipidemia type  -     Comprehensive Metabolic Panel  -     Lipid Panel  -     TSH    3. Primary hypertension  -     Comprehensive Metabolic Panel    4. Coronary artery disease involving native coronary artery of native heart without angina pectoris  -     Lipid Panel    5. PAD (peripheral artery disease)  -     Lipid Panel    6. Obstructive sleep apnea syndrome  -     TSH      Continue Novolin N and Novolin R.  Continue Zocor 80 mg/day and fish oil 1000 mg/day.  Continue Jardiance, enalapril, Lasix and metoprolol per Dr. Pemberton.  Will defer blood pressure control and treatment of coronary disease  to Dr. Pemberton.  Follow-up with Dr. Ureña, Dr. Vasquez, Dr. Pemberton, Dr. Stevens, and Dr. Rust.    Flu vaccine this fall.    RTC 6 mos.

## 2023-07-27 LAB
ALBUMIN SERPL-MCNC: 4.5 G/DL (ref 3.5–5.2)
ALBUMIN/GLOB SERPL: 1.8 G/DL
ALP SERPL-CCNC: 108 U/L (ref 39–117)
ALT SERPL-CCNC: 30 U/L (ref 1–41)
AST SERPL-CCNC: 26 U/L (ref 1–40)
BILIRUB SERPL-MCNC: 1.3 MG/DL (ref 0–1.2)
BUN SERPL-MCNC: 15 MG/DL (ref 8–23)
BUN/CREAT SERPL: 14.4 (ref 7–25)
CALCIUM SERPL-MCNC: 9.5 MG/DL (ref 8.6–10.5)
CHLORIDE SERPL-SCNC: 107 MMOL/L (ref 98–107)
CHOLEST SERPL-MCNC: 114 MG/DL (ref 0–200)
CO2 SERPL-SCNC: 22.4 MMOL/L (ref 22–29)
CREAT SERPL-MCNC: 1.04 MG/DL (ref 0.76–1.27)
EGFRCR SERPLBLD CKD-EPI 2021: 72.1 ML/MIN/1.73
GLOBULIN SER CALC-MCNC: 2.5 GM/DL
GLUCOSE SERPL-MCNC: 53 MG/DL (ref 65–99)
HBA1C MFR BLD: 7.7 % (ref 4.8–5.6)
HDLC SERPL-MCNC: 44 MG/DL (ref 40–60)
IMP & REVIEW OF LAB RESULTS: NORMAL
LDLC SERPL CALC-MCNC: 51 MG/DL (ref 0–100)
POTASSIUM SERPL-SCNC: 4.1 MMOL/L (ref 3.5–5.2)
PROT SERPL-MCNC: 7 G/DL (ref 6–8.5)
SODIUM SERPL-SCNC: 143 MMOL/L (ref 136–145)
TRIGL SERPL-MCNC: 99 MG/DL (ref 0–150)
TSH SERPL DL<=0.005 MIU/L-ACNC: 1.12 UIU/ML (ref 0.27–4.2)
VLDLC SERPL CALC-MCNC: 19 MG/DL (ref 5–40)

## 2023-08-30 ENCOUNTER — TELEPHONE (OUTPATIENT)
Dept: FAMILY MEDICINE CLINIC | Facility: CLINIC | Age: 83
End: 2023-08-30
Payer: MEDICARE

## 2023-08-30 DIAGNOSIS — K04.7 ABSCESSED TOOTH: Primary | ICD-10-CM

## 2023-08-30 RX ORDER — AMOXICILLIN 500 MG/1
500 CAPSULE ORAL 2 TIMES DAILY
Qty: 14 CAPSULE | Refills: 0 | Status: SHIPPED | OUTPATIENT
Start: 2023-08-30 | End: 2023-09-06

## 2023-08-30 NOTE — TELEPHONE ENCOUNTER
Caller: Irene De Jesus    Relationship: Emergency Contact    Best call back number: [phone]    What medication are you requesting: AMOXICILLIN 500 MG    What are your current symptoms: ABSCESS TOOTH    If a prescription is needed, what is your preferred pharmacy and phone number: [facility] [id number] - [place], [place] - [address] AT [facility] [address]- - [phone]  - [phone] FX     Additional notes: IRENE REQUESTING THIS TO BE SENT TO THE PHARMACY TODAY SO THE PATIENT CAN START THIS MEDICATION. PATIENT HAS AN ABSCESS TOOTH AND HIS ORAL SURGEON ADVISED THE PATIENT TO REQUEST AMOXCILLIN FROM DR. REYNAGA TO TAKE SEVERAL DAYS BEFORE HIS PROCEDURE.

## 2023-09-20 DIAGNOSIS — Z79.4 TYPE 2 DIABETES MELLITUS WITH COMPLICATION, WITH LONG-TERM CURRENT USE OF INSULIN: Primary | ICD-10-CM

## 2023-09-20 DIAGNOSIS — E11.8 TYPE 2 DIABETES MELLITUS WITH COMPLICATION, WITH LONG-TERM CURRENT USE OF INSULIN: Primary | ICD-10-CM

## 2023-09-21 RX ORDER — BLOOD SUGAR DIAGNOSTIC
1 STRIP MISCELLANEOUS
Qty: 500 EACH | Refills: 3 | Status: SHIPPED | OUTPATIENT
Start: 2023-09-21

## 2023-09-29 ENCOUNTER — PRIOR AUTHORIZATION (OUTPATIENT)
Dept: ENDOCRINOLOGY | Age: 83
End: 2023-09-29
Payer: MEDICARE

## 2024-01-18 ENCOUNTER — OFFICE VISIT (OUTPATIENT)
Dept: FAMILY MEDICINE CLINIC | Facility: CLINIC | Age: 84
End: 2024-01-18
Payer: MEDICARE

## 2024-01-18 VITALS
HEIGHT: 65 IN | SYSTOLIC BLOOD PRESSURE: 108 MMHG | OXYGEN SATURATION: 93 % | BODY MASS INDEX: 33.95 KG/M2 | DIASTOLIC BLOOD PRESSURE: 72 MMHG | HEART RATE: 81 BPM | WEIGHT: 203.8 LBS

## 2024-01-18 DIAGNOSIS — E66.9 OBESITY (BMI 30-39.9): ICD-10-CM

## 2024-01-18 DIAGNOSIS — J44.9 MIXED TYPE COPD (CHRONIC OBSTRUCTIVE PULMONARY DISEASE): ICD-10-CM

## 2024-01-18 DIAGNOSIS — I10 ESSENTIAL HYPERTENSION: ICD-10-CM

## 2024-01-18 DIAGNOSIS — Z00.00 MEDICARE ANNUAL WELLNESS VISIT, SUBSEQUENT: Primary | ICD-10-CM

## 2024-01-18 DIAGNOSIS — H61.21 IMPACTED CERUMEN OF RIGHT EAR: ICD-10-CM

## 2024-01-18 DIAGNOSIS — I25.10 CORONARY ARTERIOSCLEROSIS IN NATIVE ARTERY: ICD-10-CM

## 2024-01-18 DIAGNOSIS — I73.9 PAD (PERIPHERAL ARTERY DISEASE): ICD-10-CM

## 2024-01-18 DIAGNOSIS — E78.2 MIXED HYPERLIPIDEMIA: ICD-10-CM

## 2024-01-18 DIAGNOSIS — Z79.4 TYPE 2 DIABETES MELLITUS WITH OTHER CIRCULATORY COMPLICATION, WITH LONG-TERM CURRENT USE OF INSULIN: ICD-10-CM

## 2024-01-18 DIAGNOSIS — I48.0 PAROXYSMAL ATRIAL FIBRILLATION: ICD-10-CM

## 2024-01-18 DIAGNOSIS — I25.10 CORONARY ARTERY DISEASE INVOLVING NATIVE CORONARY ARTERY OF NATIVE HEART WITHOUT ANGINA PECTORIS: ICD-10-CM

## 2024-01-18 DIAGNOSIS — E11.59 TYPE 2 DIABETES MELLITUS WITH OTHER CIRCULATORY COMPLICATION, WITH LONG-TERM CURRENT USE OF INSULIN: ICD-10-CM

## 2024-01-18 PROBLEM — I77.1 ILIAC ARTERY STENOSIS, RIGHT: Status: ACTIVE | Noted: 2023-05-10

## 2024-01-18 RX ORDER — POTASSIUM CHLORIDE 750 MG/1
10 TABLET, FILM COATED, EXTENDED RELEASE ORAL 2 TIMES DAILY
Qty: 180 TABLET | Refills: 3 | Status: SHIPPED | OUTPATIENT
Start: 2024-01-18

## 2024-01-18 RX ORDER — METHYLPREDNISOLONE 4 MG/1
TABLET ORAL
Qty: 21 TABLET | Refills: 0 | Status: SHIPPED | OUTPATIENT
Start: 2024-01-18 | End: 2024-01-18

## 2024-01-18 RX ORDER — DEXTROMETHORPHAN HYDROBROMIDE AND PROMETHAZINE HYDROCHLORIDE 15; 6.25 MG/5ML; MG/5ML
5 SYRUP ORAL 4 TIMES DAILY PRN
Qty: 180 ML | Refills: 3 | Status: SHIPPED | OUTPATIENT
Start: 2024-01-18 | End: 2024-01-18

## 2024-01-18 RX ORDER — FUROSEMIDE 40 MG/1
TABLET ORAL
Qty: 180 TABLET | Refills: 3 | Status: SHIPPED | OUTPATIENT
Start: 2024-01-18

## 2024-01-18 RX ORDER — DEXTROMETHORPHAN HYDROBROMIDE AND PROMETHAZINE HYDROCHLORIDE 15; 6.25 MG/5ML; MG/5ML
5 SYRUP ORAL 4 TIMES DAILY PRN
Qty: 180 ML | Refills: 0 | Status: SHIPPED | OUTPATIENT
Start: 2024-01-18 | End: 2024-01-18 | Stop reason: SDUPTHER

## 2024-01-18 NOTE — PROGRESS NOTES
QUICK REFERENCE INFORMATION:  The ABCs of the Annual Wellness Visit    subsequent Medicare Wellness Visit    HEALTH RISK ASSESSMENT    1940  Bunny Chacon is a 83 y.o. male who presents for an Subsequent Wellness Visit.    MEAT CRITERIA FOR CHRONIC CONDITIONS:  COPD - saw pulmonary 8 days ago and FEV 1 down more;  given trelegy;  Has had increased dyspnea, nonproductive cough sicne 12/31/23, pulmonary addressed and will see back to evaluate;  He declines treatment/cxr today;  call if not better and will order/send medications.  CAD/PAF/HTN/PAD - mgmt of cad, paf by Cardiology including warfarin, reports stable and monitored closely  DM2/hld - sees Endocrinology and the manage, monitor and assess these conditions, has up coming appt.  Prefers to have Dr. Rios do his dm2 foot exam as hard time getting shoes off and will have done there anyway.  Had retinal tear and is being managed by DR. rUeña.    Wife reports hearing loss right ear with hx of cerumen impaction and would like checked.    The following portions of the patient's history were reviewed and   updated as appropriate: allergies, current medications, past family history, past medical history, past social history, past surgical history, and problem list.    Compared to one year ago, the patient feels his physical   health is the same.    Compared to one year ago, the patient feels his mental   health is the same.    Recent Hospitalizations:  He was not admitted to the hospital during the last year.     Current Medical Providers:  Patient Care Team:  Jose Stevens DO as PCP - General  Michael Pemberton MD as Consulting Physician (Cardiology)  Iram Polanco MD as Consulting Physician (Dermatology)  Mickey Fortune MD as Consulting Physician (Ophthalmology)  Kris Barney MD (Pulmonary Disease)  Lily Ureña MD (Retina Ophthalmology)  Ric Rios MD as Consulting Physician (Endocrinology)  Jeffery Rust,  MD as Consulting Physician (Pulmonary Disease)    I reviewed list of current Medical providers and suppliers with patient and are listed above to the best of the patient's and my knowledge.    Smoking Status:  Social History     Tobacco Use   Smoking Status Former    Packs/day: 1.50    Years: 50.00    Additional pack years: 0.00    Total pack years: 75.00    Types: Cigarettes   Smokeless Tobacco Never       Alcohol Consumption:  Social History     Substance and Sexual Activity   Alcohol Use No       Depression Screen:       2024     1:33 PM   PHQ-2/PHQ-9 Depression Screening   Little Interest or Pleasure in Doing Things 0-->not at all   Feeling Down, Depressed or Hopeless 0-->not at all   PHQ-9: Brief Depression Severity Measure Score 0       Health Habits and Functional and Cognitive Screenin/18/2024     1:00 PM   Functional & Cognitive Status   Do you have difficulty preparing food and eating? No   Do you have difficulty bathing yourself, getting dressed or grooming yourself? No   Do you have difficulty using the toilet? No   Do you have difficulty moving around from place to place? No   Do you have trouble with steps or getting out of a bed or a chair? No   Current Diet Well Balanced Diet   Dental Exam Not up to date   Eye Exam Up to date   Exercise (times per week) 0 times per week   Current Exercises Include No Regular Exercise   Do you need help using the phone?  No   Are you deaf or do you have serious difficulty hearing?  Yes   Do you need help to go to places out of walking distance? Yes   Do you need help shopping? Yes   Do you need help preparing meals?  Yes   Do you need help with housework?  No   Do you need help with laundry? No   Do you need help taking your medications? No   Do you need help managing money? No   Do you ever drive or ride in a car without wearing a seat belt? No   Have you felt unusual stress, anger or loneliness in the last month? No   Who do you live with? Spouse   If  you need help, do you have trouble finding someone available to you? No   Have you been bothered in the last four weeks by sexual problems? No   Do you have difficulty concentrating, remembering or making decisions? Yes       Does the patient have evidence of cognitive impairment? No    Aspirin use counseling: Does not need ASA (and currently is not on it)    Recent Lab Results:  CMP:  Lab Results   Component Value Date    BUN 15 07/26/2023    CREATININE 1.04 07/26/2023    EGFRIFNONA 64 02/21/2022    EGFRIFAFRI 74 02/21/2022    BCR 14.4 07/26/2023     07/26/2023    K 4.1 07/26/2023    CO2 22.4 07/26/2023    CALCIUM 9.5 07/26/2023    PROTENTOTREF 7.0 07/26/2023    ALBUMIN 4.5 07/26/2023    LABGLOBREF 2.5 07/26/2023    LABIL2 1.8 07/26/2023    BILITOT 1.3 (H) 07/26/2023    ALKPHOS 108 07/26/2023    AST 26 07/26/2023    ALT 30 07/26/2023     Lipid Panel:  Lab Results   Component Value Date    TRIG 99 07/26/2023    HDL 44 07/26/2023    VLDL 19 07/26/2023     HbA1c:  Lab Results   Component Value Date    HGBA1C 7.70 (H) 07/26/2023       Visual Acuity:  No results found.    Age-appropriate Screening Schedule:  Refer to the list below for future screening recommendations based on patient's age, sex and/or medical conditions. Orders for these recommended tests are listed in the plan section. The patient has been provided with a written plan.    Health Maintenance   Topic Date Due    DIABETIC FOOT EXAM  01/13/2024    URINE MICROALBUMIN  01/13/2024    HEMOGLOBIN A1C  01/26/2024    LIPID PANEL  07/26/2024    DIABETIC EYE EXAM  08/30/2024    ANNUAL WELLNESS VISIT  01/18/2025    BMI FOLLOWUP  01/18/2025    COLORECTAL CANCER SCREENING  01/04/2028    TDAP/TD VACCINES (3 - Td or Tdap) 03/03/2028    COVID-19 Vaccine  Completed    INFLUENZA VACCINE  Completed    Pneumococcal Vaccine 65+  Discontinued          Outpatient Medications Prior to Visit   Medication Sig Dispense Refill    empagliflozin (JARDIANCE) 10 MG tablet tablet  Take 1 tablet by mouth Daily.      enalapril (VASOTEC) 2.5 MG tablet TAKE 1 TABLET EVERY DAY 90 tablet 3    glucose blood (OneTouch Ultra) test strip 1 each by Other route 5 (Five) Times a Day. Indications: Type 2 Diabetes 500 each 3    insulin NPH (NovoLIN N ReliOn) 100 UNIT/ML injection INJECT 46 UNITS SUBCUTANEOUSLY BEFORE BREAKFAST AND INJECT 12 UNITS AT BEDTIME. 60 mL 2    Insulin Pen Needle 31G X 6 MM misc USE AS DIRECTED.      insulin regular (NovoLIN R ReliOn) 100 UNIT/ML injection INJECT 10 UNITS SUBCUTANEOUSLY AT BREAKFAST AND INJECT 12 UNITS AT SUPPER. MAY TAKE 2 TO 5 UNITS extra IF BLOOD SUGARS ARE ABOVE 200. 30 mL 2    ipratropium-albuterol (DUO-NEB) 0.5-2.5 mg/3 ml nebulizer Take 3 mL by nebulization Every 4 (Four) Hours As Needed for Wheezing. 5 times daily      LORazepam (Ativan) 0.5 MG tablet Take 1 tablet by mouth Every 8 (Eight) Hours As Needed (anxiety over surgery). 3 tablet 0    metoprolol tartrate (LOPRESSOR) 50 MG tablet TAKE 1 TABLET EVERY 12 HOURS 180 tablet 3    Multiple Vitamin (MULTI VITAMIN DAILY) tablet Take 1 tablet by mouth Daily.      multivitamin-minerals (OCUVITE) tablet Take 1 tablet by mouth 2 (Two) Times a Day.      Omega-3 Fatty Acids (GNP FISH OIL) 1000 MG capsule Take 1 capsule by mouth daily.      ONETOUCH DELICA LANCETS FINE misc       simvastatin (ZOCOR) 80 MG tablet TAKE 1 TABLET EVERY DAY 90 tablet 3    warfarin (COUMADIN) 7.5 MG tablet Take 1 tablet by mouth Daily. 1 1/2 tablet tues & thur 1 tablet the rest of the week      furosemide (LASIX) 40 MG tablet TAKE 1 TABLET TWICE DAILY AS NEEDED FOR LEG SWELLING 180 tablet 3     No facility-administered medications prior to visit.       Patient Active Problem List   Diagnosis    Hyperlipidemia    Primary hypertension    CAD (coronary artery disease)    Sleep apnea    Paroxysmal atrial fibrillation    Macular degeneration    History of atrial fibrillation    ASCVD (arteriosclerotic cardiovascular disease)    Diabetic eye  exam    Long term current use of anticoagulant    Obesity    PAD (peripheral artery disease)    SOBOE (shortness of breath on exertion)    Type 2 diabetes mellitus with complication, with long-term current use of insulin    Iliac artery stenosis, right       Advance Care Planning:  ACP discussion was held with the patient during this visit. Patient does not have an advance directive, information provided.    Identification of Risk Factors:  Risk factors include: Obesity/Overweight .    Review of Systems   Constitutional:  Negative for chills and fever.   HENT:  Positive for hearing loss.    Eyes:  Positive for visual disturbance (seeing opthalmology for retinal tear).   Respiratory:  Positive for cough and shortness of breath.    Cardiovascular:  Negative for chest pain, palpitations and leg swelling.   Gastrointestinal:  Negative for abdominal pain.       Compared to one year ago, the patient feels his physical health is the same.  Compared to one year ago, the patient feels his mental health is the same.    Objective     Physical Exam  Vitals and nursing note reviewed.   Constitutional:       Appearance: He is well-developed.   HENT:      Head: Normocephalic and atraumatic.      Right Ear: There is impacted cerumen.      Left Ear: Tympanic membrane normal.      Ears:      Comments: MA irrigated right ear with removal of cerumen, recheck TM intact and clear;  reports improvement in hearing.   Neck:      Thyroid: No thyromegaly.      Vascular: No JVD.   Cardiovascular:      Rate and Rhythm: Normal rate and regular rhythm.      Heart sounds: Normal heart sounds. No murmur heard.     No friction rub. No gallop.   Pulmonary:      Effort: Pulmonary effort is normal. No respiratory distress.      Breath sounds: Decreased air movement present. Decreased breath sounds present. No wheezing or rales.   Abdominal:      General: Bowel sounds are normal. There is no distension.      Palpations: Abdomen is soft.      Tenderness:  "There is no abdominal tenderness. There is no guarding or rebound.   Musculoskeletal:      Cervical back: Neck supple.   Skin:     General: Skin is warm and dry.   Neurological:      Mental Status: He is alert.   Psychiatric:         Behavior: Behavior normal.         Vitals:    01/18/24 1324   BP: 108/72   Pulse: 81   SpO2: 93%   Weight: 92.4 kg (203 lb 12.8 oz)   Height: 165.1 cm (65\")     Body mass index is 33.91 kg/m².    BMI is >= 30 and <35. (Class 1 Obesity). The following options were offered after discussion;: weight loss educational material (shared in after visit summary)      Assessment & Plan   Patient Self-Management and Personalized Health Advice  The patient has been provided with information about: diet and exercise and preventive services including:   Annual Wellness Visit (AWV).    Visit Diagnoses:    ICD-10-CM ICD-9-CM   1. Medicare annual wellness visit, subsequent  Z00.00 V70.0   2. Obesity (BMI 30-39.9)  E66.9 278.00   3. Type 2 diabetes mellitus with other circulatory complication, with long-term current use of insulin  E11.59 250.70    Z79.4 V58.67   4. PAD (peripheral artery disease)  I73.9 443.9   5. Paroxysmal atrial fibrillation  I48.0 427.31   6. Coronary artery disease involving native coronary artery of native heart without angina pectoris  I25.10 414.01   7. Essential hypertension  I10 401.9   8. Mixed hyperlipidemia  E78.2 272.2   9. Mixed type COPD (chronic obstructive pulmonary disease)  J44.9 496   10. Coronary arteriosclerosis in native artery  I25.10 414.01   11. Impacted cerumen of right ear  H61.21 380.4       Orders Placed This Encounter   Procedures    Ear Cerumen Removal     This order was created via procedure documentation     Order Specific Question:   Release to patient     Answer:   Routine Release [[id number]]       Outpatient Encounter Medications as of 1/18/2024   Medication Sig Dispense Refill    empagliflozin (JARDIANCE) 10 MG tablet tablet Take 1 tablet by mouth " Daily.      enalapril (VASOTEC) 2.5 MG tablet TAKE 1 TABLET EVERY DAY 90 tablet 3    furosemide (LASIX) 40 MG tablet 1 PO BID AS NEEDED LEG SWELLING 180 tablet 3    glucose blood (OneTouch Ultra) test strip 1 each by Other route 5 (Five) Times a Day. Indications: Type 2 Diabetes 500 each 3    insulin NPH (NovoLIN N ReliOn) 100 UNIT/ML injection INJECT 46 UNITS SUBCUTANEOUSLY BEFORE BREAKFAST AND INJECT 12 UNITS AT BEDTIME. 60 mL 2    Insulin Pen Needle 31G X 6 MM misc USE AS DIRECTED.      insulin regular (NovoLIN R ReliOn) 100 UNIT/ML injection INJECT 10 UNITS SUBCUTANEOUSLY AT BREAKFAST AND INJECT 12 UNITS AT SUPPER. MAY TAKE 2 TO 5 UNITS extra IF BLOOD SUGARS ARE ABOVE 200. 30 mL 2    ipratropium-albuterol (DUO-NEB) 0.5-2.5 mg/3 ml nebulizer Take 3 mL by nebulization Every 4 (Four) Hours As Needed for Wheezing. 5 times daily      LORazepam (Ativan) 0.5 MG tablet Take 1 tablet by mouth Every 8 (Eight) Hours As Needed (anxiety over surgery). 3 tablet 0    metoprolol tartrate (LOPRESSOR) 50 MG tablet TAKE 1 TABLET EVERY 12 HOURS 180 tablet 3    Multiple Vitamin (MULTI VITAMIN DAILY) tablet Take 1 tablet by mouth Daily.      multivitamin-minerals (OCUVITE) tablet Take 1 tablet by mouth 2 (Two) Times a Day.      Omega-3 Fatty Acids (GNP FISH OIL) 1000 MG capsule Take 1 capsule by mouth daily.      ONETOUCH DELICA LANCETS FINE misc       simvastatin (ZOCOR) 80 MG tablet TAKE 1 TABLET EVERY DAY 90 tablet 3    warfarin (COUMADIN) 7.5 MG tablet Take 1 tablet by mouth Daily. 1 1/2 tablet tues & thur 1 tablet the rest of the week      [DISCONTINUED] furosemide (LASIX) 40 MG tablet TAKE 1 TABLET TWICE DAILY AS NEEDED FOR LEG SWELLING 180 tablet 3    [DISCONTINUED] promethazine-dextromethorphan (PROMETHAZINE-DM) 6.25-15 MG/5ML syrup Take 5 mL by mouth 4 (Four) Times a Day As Needed for Cough. 180 mL 3    potassium chloride 10 MEQ CR tablet Take 1 tablet by mouth 2 (Two) Times a Day. 180 tablet 3    [DISCONTINUED]  methylPREDNISolone (MEDROL) 4 MG dose pack Take as directed on package instructions. 21 tablet 0    [DISCONTINUED] promethazine-dextromethorphan (PROMETHAZINE-DM) 6.25-15 MG/5ML syrup Take 5 mL by mouth 4 (Four) Times a Day As Needed for Cough. 180 mL 0     No facility-administered encounter medications on file as of 1/18/2024.       Reviewed use of high risk medication in the elderly: yes  Reviewed for potential of harmful drug interactions in the elderly: yes  No opioid medication identified on active medication list. I have reviewed chart for other potential  high risk medication/s and harmful drug interactions in the elderly.    Social History     Socioeconomic History    Marital status:    Tobacco Use    Smoking status: Former     Packs/day: 1.50     Years: 50.00     Additional pack years: 0.00     Total pack years: 75.00     Types: Cigarettes    Smokeless tobacco: Never   Vaping Use    Vaping Use: Never used   Substance and Sexual Activity    Alcohol use: No    Drug use: No     Patient was screened for OUD and JAMILA risk factors.  Bunny Chacon's pain level was assessed as well today.  I included a review current recommendations on pain management, best use practices, current CDC guidelines, alternatives to opioids including OTC & Rx topicals, non-opioid oral medications (eg nsaids, acetominophen) and life style interventions such as yoga, lacy chi, stretching, regular exercise, PT/OT and referral to specialty care like Pain Management that specialize in pain treatment when appropriate.   I also included a review of serious risks of opioid use and substance use disorder.  I have included all of this in the after visit summary along with other risk factors identified that are pertinent to the patient today.    BMI is >= 30 and <35. (Class 1 Obesity). The following options were offered after discussion;: weight loss educational material (shared in after visit summary)    Follow Up:  Return in about 1  year (around 1/18/2025), or if symptoms worsen or fail to improve.     An After Visit Summary and PPPS with all of these plans were given to the patient.      Ear Cerumen Removal    Date/Time: 1/18/2024 2:51 PM    Performed by: Marii Gagnon MA  Authorized by: Jose Setvens DO  Consent: Verbal consent obtained.  Risks and benefits: risks, benefits and alternatives were discussed  Consent given by: patient  Patient understanding: patient states understanding of the procedure being performed  Required items: required blood products, implants, devices, and special equipment available  Patient identity confirmed: verbally with patient    Anesthesia:  Local Anesthetic: none  Location details: right ear  Patient tolerance: Patient tolerated the procedure well with no immediate complications  Procedure type: irrigation   Sedation:  Patient sedated: no                 ++++++++++++++++++++++++++++++++++++++++++++++++++++++++++++++++++

## 2024-01-18 NOTE — PATIENT INSTRUCTIONS
Advance Care Planning and Advance Directives     You make decisions on a daily basis - decisions about where you want to live, your career, your home, your life. Perhaps one of the most important decisions you face is your choice for future medical care. Take time to talk with your family and your healthcare team and start planning today.  Advance Care Planning is a process that can help you:  Understand possible future healthcare decisions in light of your own experiences  Reflect on those decision in light of your goals and values  Discuss your decisions with those closest to you and the healthcare professionals that care for you  Make a plan by creating a document that reflects your wishes    Surrogate Decision Maker  In the event of a medical emergency, which has left you unable to communicate or to make your own decisions, you would need someone to make decisions for you.  It is important to discuss your preferences for medical treatment with this person while you are in good health.     Qualities of a surrogate decision maker:  Willing to take on this role and responsibility  Knows what you want for future medical care  Willing to follow your wishes even if they don't agree with them  Able to make difficult medical decisions under stressful circumstances    Advance Directives  These are legal documents you can create that will guide your healthcare team and decision maker(s) when needed. These documents can be stored in the electronic medical record.    Living Will - a legal document to guide your care if you have a terminal condition or a serious illness and are unable to communicate. States vary by statute in document names/types, but most forms may include one or more of the following:        -  Directions regarding life-prolonging treatments        -  Directions regarding artificially provided nutrition/hydration        -  Choosing a healthcare decision maker        -  Direction regarding organ/tissue  donation    Durable Power of  for Healthcare - this document names an -in-fact to make medical decisions for you, but it may also allow this person to make personal and financial decisions for you. Please seek the advice of an  if you need this type of document.    **Advance Directives are not required and no one may discriminate against you if you do not sign one.    Medical Orders  Many states allow specific forms/orders signed by your physician to record your wishes for medical treatment in your current state of health. This form, signed in personal communication with your physician, addresses resuscitation and other medical interventions that you may or may not want.      For more information or to schedule a time with a [facility] Advance Care Planning Facilitator contact: [facility] or call [phone] and someone will contact you directly.Calorie Counting for Weight Loss  Calories are units of energy. Your body needs a certain number of calories from food to keep going throughout the day. When you eat or drink more calories than your body needs, your body stores the extra calories mostly as fat. When you eat or drink fewer calories than your body needs, your body burns fat to get the energy it needs.  Calorie counting means keeping track of how many calories you eat and drink each day. Calorie counting can be helpful if you need to lose weight. If you eat fewer calories than your body needs, you should lose weight. Ask your health care provider what a healthy weight is for you.  For calorie counting to work, you will need to eat the right number of calories each day to lose a healthy amount of weight per week. A dietitian can help you figure out how many calories you need in a day and will suggest ways to reach your calorie goal.  A healthy amount of weight to lose each week is usually 1-2 lb (0.5-0.9 kg). This usually means that your daily calorie intake should be  reduced by 500-750 calories.  Eating 1,200-1,500 calories a day can help most women lose weight.  Eating 1,500-1,800 calories a day can help most men lose weight.  What do I need to know about calorie counting?  Work with your health care provider or dietitian to determine how many calories you should get each day. To meet your daily calorie goal, you will need to:  Find out how many calories are in each food that you would like to eat. Try to do this before you eat.  Decide how much of the food you plan to eat.  Keep a food log. Do this by writing down what you ate and how many calories it had.  To successfully lose weight, it is important to balance calorie counting with a healthy lifestyle that includes regular activity.  Where do I find calorie information?  The number of calories in a food can be found on a Nutrition Facts label. If a food does not have a Nutrition Facts label, try to look up the calories online or ask your dietitian for help.  Remember that calories are listed per serving. If you choose to have more than one serving of a food, you will have to multiply the calories per serving by the number of servings you plan to eat. For example, the label on a package of bread might say that a serving size is 1 slice and that there are 90 calories in a serving. If you eat 1 slice, you will have eaten 90 calories. If you eat 2 slices, you will have eaten 180 calories.  How do I keep a food log?  After each time that you eat, record the following in your food log as soon as possible:  What you ate. Be sure to include toppings, sauces, and other extras on the food.  How much you ate. This can be measured in cups, ounces, or number of items.  How many calories were in each food and drink.  The total number of calories in the food you ate.  Keep your food log near you, such as in a pocket-sized notebook or on an lisa or website on your mobile phone. Some programs will calculate calories for you and show you how  many calories you have left to meet your daily goal.  What are some portion-control tips?  Know how many calories are in a serving. This will help you know how many servings you can have of a certain food.  Use a measuring cup to measure serving sizes. You could also try weighing out portions on a kitchen scale. With time, you will be able to estimate serving sizes for some foods.  Take time to put servings of different foods on your favorite plates or in your favorite bowls and cups so you know what a serving looks like.  Try not to eat straight from a food's packaging, such as from a bag or box. Eating straight from the package makes it hard to see how much you are eating and can lead to overeating. Put the amount you would like to eat in a cup or on a plate to make sure you are eating the right portion.  Use smaller plates, glasses, and bowls for smaller portions and to prevent overeating.  Try not to multitask. For example, avoid watching TV or using your computer while eating. If it is time to eat, sit down at a table and enjoy your food. This will help you recognize when you are full. It will also help you be more mindful of what and how much you are eating.  What are tips for following this plan?  Reading food labels  Check the calorie count compared with the serving size. The serving size may be smaller than what you are used to eating.  Check the source of the calories. Try to choose foods that are high in protein, fiber, and vitamins, and low in saturated fat, trans fat, and sodium.  Shopping  Read nutrition labels while you shop. This will help you make healthy decisions about which foods to buy.  Pay attention to nutrition labels for low-fat or fat-free foods. These foods sometimes have the same number of calories or more calories than the full-fat versions. They also often have added sugar, starch, or salt to make up for flavor that was removed with the fat.  Make a grocery list of lower-calorie foods  and stick to it.  Cooking  Try to cook your favorite foods in a healthier way. For example, try baking instead of frying.  Use low-fat dairy products.  Meal planning  Use more fruits and vegetables. One-half of your plate should be fruits and vegetables.  Include lean proteins, such as chicken, turkey, and fish.  Lifestyle  Each week, aim to do one of the followin minutes of moderate exercise, such as walking.  75 minutes of vigorous exercise, such as running.  General information  Know how many calories are in the foods you eat most often. This will help you calculate calorie counts faster.  Find a way of tracking calories that works for you. Get creative. Try different apps or programs if writing down calories does not work for you.  What foods should I eat?    Eat nutritious foods. It is better to have a nutritious, high-calorie food, such as an avocado, than a food with few nutrients, such as a bag of potato chips.  Use your calories on foods and drinks that will fill you up and will not leave you hungry soon after eating.  Examples of foods that fill you up are nuts and nut butters, vegetables, lean proteins, and high-fiber foods such as whole grains. High-fiber foods are foods with more than 5 g of fiber per serving.  Pay attention to calories in drinks. Low-calorie drinks include water and unsweetened drinks.  The items listed above may not be a complete list of foods and beverages you can eat. Contact a dietitian for more information.  What foods should I limit?  Limit foods or drinks that are not good sources of vitamins, minerals, or protein or that are high in unhealthy fats. These include:  Candy.  Other sweets.  Sodas, specialty coffee drinks, alcohol, and juice.  The items listed above may not be a complete list of foods and beverages you should avoid. Contact a dietitian for more information.  How do I count calories when eating out?  Pay attention to portions. Often, portions are much larger  when eating out. Try these tips to keep portions smaller:  Consider sharing a meal instead of getting your own.  If you get your own meal, eat only half of it. Before you start eating, ask for a container and put half of your meal into it.  When available, consider ordering smaller portions from the menu instead of full portions.  Pay attention to your food and drink choices. Knowing the way food is cooked and what is included with the meal can help you eat fewer calories.  If calories are listed on the menu, choose the lower-calorie options.  Choose dishes that include vegetables, fruits, whole grains, low-fat dairy products, and lean proteins.  Choose items that are boiled, broiled, grilled, or steamed. Avoid items that are buttered, battered, fried, or served with cream sauce. Items labeled as crispy are usually fried, unless stated otherwise.  Choose water, low-fat milk, unsweetened iced tea, or other drinks without added sugar. If you want an alcoholic beverage, choose a lower-calorie option, such as a glass of wine or light beer.  Ask for dressings, sauces, and syrups on the side. These are usually high in calories, so you should limit the amount you eat.  If you want a salad, choose a garden salad and ask for grilled meats. Avoid extra toppings such as mccray, cheese, or fried items. Ask for the dressing on the side, or ask for olive oil and vinegar or lemon to use as dressing.  Estimate how many servings of a food you are given. Knowing serving sizes will help you be aware of how much food you are eating at restaurants.  Where to find more information  Centers for Disease Control and Prevention: www.cdc.gov  [place] Department of Agriculture: myplate.gov  Summary  Calorie counting means keeping track of how many calories you eat and drink each day. If you eat fewer calories than your body needs, you should lose weight.  A healthy amount of weight to lose per week is usually 1-2 lb (0.5-0.9 kg). This usually  means reducing your daily calorie intake by 500-750 calories.  The number of calories in a food can be found on a Nutrition Facts label. If a food does not have a Nutrition Facts label, try to look up the calories online or ask your dietitian for help.  Use smaller plates, glasses, and bowls for smaller portions and to prevent overeating.  Use your calories on foods and drinks that will fill you up and not leave you hungry shortly after a meal.  This information is not intended to replace advice given to you by your health care provider. Make sure you discuss any questions you have with your health care provider.  Document Revised: 01/28/2021 Document Reviewed: 01/28/2021  [employer] Patient Education © 2022 [employer]    Exercising to Lose Weight  Getting regular exercise is important for everyone. It is especially important if you are overweight. Being overweight increases your risk of heart disease, stroke, diabetes, high blood pressure, and several types of cancer. Exercising, and reducing the calories you consume, can help you lose weight and improve fitness and health.  Exercise can be moderate or vigorous intensity. To lose weight, most people need to do a certain amount of moderate or vigorous-intensity exercise each week.  How can exercise affect me?  You lose weight when you exercise enough to burn more calories than you eat. Exercise also reduces body fat and builds muscle. The more muscle you have, the more calories you burn. Exercise also:  Improves mood.  Reduces stress and tension.  Improves your overall fitness, flexibility, and endurance.  Increases bone strength.  Moderate-intensity exercise  Moderate-intensity exercise is any activity that gets you moving enough to burn at least three times more energy (calories) than if you were sitting.  Examples of moderate exercise include:  Walking a mile in 15 minutes.  Doing light yard work.  Biking at an easy pace.  Most people should get at least 150  minutes of moderate-intensity exercise a week to maintain their body weight.  Vigorous-intensity exercise  Vigorous-intensity exercise is any activity that gets you moving enough to burn at least six times more calories than if you were sitting. When you exercise at this intensity, you should be working hard enough that you are not able to carry on a conversation.  Examples of vigorous exercise include:  Running.  Playing a team sport, such as football, basketball, and soccer.  Jumping rope.  Most people should get at least 75 minutes a week of vigorous exercise to maintain their body weight.  What actions can I take to lose weight?  The amount of exercise you need to lose weight depends on:  Your age.  The type of exercise.  Any health conditions you have.  Your overall physical ability.  Talk to your health care provider about how much exercise you need and what types of activities are safe for you.  Nutrition    Make changes to your diet as told by your health care provider or diet and nutrition specialist (dietitian). This may include:  Eating fewer calories.  Eating more protein.  Eating less unhealthy fats.  Eating a diet that includes fresh fruits and vegetables, whole grains, low-fat dairy products, and lean protein.  Avoiding foods with added fat, salt, and sugar.  Drink plenty of water while you exercise to prevent dehydration or heat stroke.  Activity  Choose an activity that you enjoy and set realistic goals. Your health care provider can help you make an exercise plan that works for you.  Exercise at a moderate or vigorous intensity most days of the week.  The intensity of exercise may vary from person to person. You can tell how intense a workout is for you by paying attention to your breathing and heartbeat. Most people will notice their breathing and heartbeat get faster with more intense exercise.  Do resistance training twice each week, such as:  Push-ups.  Sit-ups.  Lifting weights.  Using  resistance bands.  Getting short amounts of exercise can be just as helpful as long, structured periods of exercise. If you have trouble finding time to exercise, try doing these things as part of your daily routine:  Get up, stretch, and walk around every 30 minutes throughout the day.  Go for a walk during your lunch break.  Park your car farther away from your destination.  If you take public transportation, get off one stop early and walk the rest of the way.  Make phone calls while standing up and walking around.  Take the stairs instead of elevators or escalators.  Wear comfortable clothes and shoes with good support.  Do not exercise so much that you hurt yourself, feel dizzy, or get very short of breath.  Where to find more information  [place] Department of Health and Human Services: www.hhs.gov  Centers for Disease Control and Prevention: www.cdc.gov  Contact a health care provider:  Before starting a new exercise program.  If you have questions or concerns about your weight.  If you have a medical problem that keeps you from exercising.  Get help right away if:  You have any of the following while exercising:  Injury.  Dizziness.  Difficulty breathing or shortness of breath that does not go away when you stop exercising.  Chest pain.  Rapid heartbeat.  These symptoms may represent a serious problem that is an emergency. Do not wait to see if the symptoms will go away. Get medical help right away. Call your local emergency services (911 in the [place] Do not drive yourself to the hospital.  Summary  Getting regular exercise is especially important if you are overweight.  Being overweight increases your risk of heart disease, stroke, diabetes, high blood pressure, and several types of cancer.  Losing weight happens when you burn more calories than you eat.  Reducing the amount of calories you eat, and getting regular moderate or vigorous exercise each week, helps you lose weight.  This information is not  intended to replace advice given to you by your health care provider. Make sure you discuss any questions you have with your health care provider.  Document Revised: 02/13/2022 Document Reviewed: 02/13/2022  [employer] Patient Education © 2022 [employer]

## 2024-01-29 ENCOUNTER — OFFICE VISIT (OUTPATIENT)
Dept: ENDOCRINOLOGY | Age: 84
End: 2024-01-29
Payer: MEDICARE

## 2024-01-29 VITALS
WEIGHT: 203.6 LBS | OXYGEN SATURATION: 96 % | BODY MASS INDEX: 33.92 KG/M2 | HEART RATE: 94 BPM | DIASTOLIC BLOOD PRESSURE: 70 MMHG | SYSTOLIC BLOOD PRESSURE: 104 MMHG | HEIGHT: 65 IN | TEMPERATURE: 97.9 F

## 2024-01-29 DIAGNOSIS — E78.5 HYPERLIPIDEMIA, UNSPECIFIED HYPERLIPIDEMIA TYPE: ICD-10-CM

## 2024-01-29 DIAGNOSIS — I48.21 PERMANENT ATRIAL FIBRILLATION: ICD-10-CM

## 2024-01-29 DIAGNOSIS — I10 PRIMARY HYPERTENSION: ICD-10-CM

## 2024-01-29 DIAGNOSIS — Z87.09 HISTORY OF COPD: ICD-10-CM

## 2024-01-29 DIAGNOSIS — G47.33 OBSTRUCTIVE SLEEP APNEA SYNDROME: ICD-10-CM

## 2024-01-29 DIAGNOSIS — R80.9 TYPE 2 DIABETES MELLITUS WITH MICROALBUMINURIA, WITH LONG-TERM CURRENT USE OF INSULIN: ICD-10-CM

## 2024-01-29 DIAGNOSIS — E11.8 TYPE 2 DIABETES MELLITUS WITH COMPLICATION, WITH LONG-TERM CURRENT USE OF INSULIN: Primary | ICD-10-CM

## 2024-01-29 DIAGNOSIS — I73.9 PAD (PERIPHERAL ARTERY DISEASE): ICD-10-CM

## 2024-01-29 DIAGNOSIS — I25.10 CORONARY ARTERY DISEASE INVOLVING NATIVE CORONARY ARTERY OF NATIVE HEART WITHOUT ANGINA PECTORIS: ICD-10-CM

## 2024-01-29 DIAGNOSIS — E11.29 TYPE 2 DIABETES MELLITUS WITH MICROALBUMINURIA, WITH LONG-TERM CURRENT USE OF INSULIN: ICD-10-CM

## 2024-01-29 DIAGNOSIS — Z79.4 TYPE 2 DIABETES MELLITUS WITH MICROALBUMINURIA, WITH LONG-TERM CURRENT USE OF INSULIN: ICD-10-CM

## 2024-01-29 DIAGNOSIS — Z79.4 TYPE 2 DIABETES MELLITUS WITH COMPLICATION, WITH LONG-TERM CURRENT USE OF INSULIN: Primary | ICD-10-CM

## 2024-01-29 PROCEDURE — 3078F DIAST BP <80 MM HG: CPT | Performed by: INTERNAL MEDICINE

## 2024-01-29 PROCEDURE — 99214 OFFICE O/P EST MOD 30 MIN: CPT | Performed by: INTERNAL MEDICINE

## 2024-01-29 PROCEDURE — 3074F SYST BP LT 130 MM HG: CPT | Performed by: INTERNAL MEDICINE

## 2024-01-29 RX ORDER — DEXTROMETHORPHAN HYDROBROMIDE AND PROMETHAZINE HYDROCHLORIDE 15; 6.25 MG/5ML; MG/5ML
SYRUP ORAL
COMMUNITY
Start: 2024-01-25

## 2024-01-29 NOTE — LETTER
January 29, 2024     Jose Stevens DO  [address]  [place] [address]  [facility] [address]    Patient: Bunny Chacon   YOB: 1940   Date of Visit: 1/29/2024     Dear Jose Stevens DO:       Thank you for referring Bunny Chacon to me for evaluation. Below are the relevant portions of my assessment and plan of care.    If you have questions, please do not hesitate to call me. I look forward to following Bunny along with you.         Sincerely,        Ric Rios MD        CC: MD Lily Gregory MD Richard Wayne Baker, MD Ferenc Pantaleone Nagy, MD Yson, Ric NEWMAN MD  01/29/24 1322  Sign when Signing Visit  Subjective  Bunny Chacon is a 83 y.o. male.     History of Present Illness     He has type 2 diabetes mellitus and has been on Jardiance 10 mg/day, Novolin N 46 and R 10 every AM and R 11 at supper and N 12 q hs.  He checks his blood sugar 5 times a day. He was switched from analog insulin to human insulin to reduce drug costs. He denies severe hypoglycemic episodes.  He has no early morning hypoglycemia.  He does not want to use a glucose sensor.  Fasting glucose .  -235.  He has no early morning hypoglycemia.  His last meal was at 10 AM.      His last eye examination was in 1/24.  He had vitrectomy twice on the left eye done by Dr. Ureña in 2023. He has macular degeneration and had intraocular injection on left eye.  He has peripheral vision on his right eye.  He had previous bilateral cataract surgery . He has microalbuminuria on urine sample taken last January 2023. He is on enalapril. He denies any paresthesias.      He has hyperlipidemia and has been on Zocor 80 mg once a day,  and fish oil 1000 mg once a day. He denies myalgia.  He has no weight change since 7/23.      He has hypertension and CAD.  He had CABG in 1997 at [facility]. He has been on chronic Coumadin therapy for permanent atrial fibrillation. ProTime is being monitored by  Dr. Pemberton.  He is on Jardiance 10 mg/day, enalapril 2.5 mg/day, Lasix 40 mg twice daily and Lopressor 50 mg twice a day.  He denies any bleeding. He denies any chest pain, or palpitations.      He had an arterial ultrasound done in June 2022 which showed right lower extremity with no evidence of hemodynamically significant arterial stenosis.  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  He had an arterial ultrasound of the lower extremities in June 2022 which showed right common iliac artery with greater than 50% stenosis.      Arterial ultrasound done in March 2023 showed occlusion of the left popliteal artery and right lower extremity with no evidence of significant stenosis.  Left lower extremity left lower extremity has moderate arthrosis has moderate arterial insufficiency at rest with digit pressure 56 mmHg which is adequate for healing.  Right lower extremity shows normal arterial circulation at rest with digit pressure of 89 mmHg.  He is scheduled arterial ultrasound in February 2024.  He follows with Dr. Vasquez.     He has sleep apnea and uses his CPAP regularly.  He was diagnosed to have COPD by Dr. Jeffery Rust.  He is on Duoneb nebulizer 5 times per day.     He had flu vaccine and COVID-vaccine last fall.    The following portions of the patient's history were reviewed and updated as appropriate: allergies, current medications, past family history, past medical history, past social history, past surgical history, and problem list.    Review of Systems   Eyes:  Positive for visual disturbance.   Respiratory:  Negative for shortness of breath and wheezing.    Cardiovascular:  Negative for chest pain and palpitations.   Gastrointestinal:  Positive for constipation. Negative for anal bleeding and blood in stool.   Genitourinary: Negative.  Negative for hematuria.   Musculoskeletal:  Negative for myalgias.   Neurological:  Negative for numbness.     Vitals:    01/29/24  "1219   BP: 104/70   Pulse: 94   Temp: 97.9 °F (36.6 °C)   TempSrc: Temporal   SpO2: 96%   Weight: 92.4 kg (203 lb 9.6 oz)   Height: 165.1 cm (65\")      Objective  Physical Exam  Constitutional:       Appearance: Normal appearance.   Eyes:      General: No scleral icterus.        Right eye: No discharge.         Left eye: No discharge.      Extraocular Movements: Extraocular movements intact.   Neck:      Vascular: No carotid bruit.   Cardiovascular:      Rate and Rhythm: Rhythm irregular.      Heart sounds: No murmur heard.  Pulmonary:      Effort: No respiratory distress.      Breath sounds: Normal breath sounds. No stridor.   Abdominal:      Palpations: Abdomen is soft.      Tenderness: There is no abdominal tenderness.   Musculoskeletal:      Right lower leg: No edema.      Left lower leg: No edema.      Comments: Feet cool but not cyanotic.  Poor dorsalis pedis pulses bilaterally.  No plantar ulcers.   Lymphadenopathy:      Cervical: No cervical adenopathy.   Skin:     General: Skin is warm.   Neurological:      Mental Status: He is alert and oriented to person, place, and time.      Comments: Intact light touch in lower extremities.       Office Visit on 07/26/2023   Component Date Value Ref Range Status   • Glucose 07/26/2023 53 (L)  65 - 99 mg/dL Final   • BUN 07/26/2023 15  8 - 23 mg/dL Final   • Creatinine 07/26/2023 1.04  0.76 - 1.27 mg/dL Final   • EGFR Result 07/26/2023 72.1  >60.0 mL/min/1.73 Final    Comment: GFR Normal >60  Chronic Kidney Disease <60  Kidney Failure <15  The GFR formula is only valid for adults with stable renal  function between ages 18 and 70.     • BUN/Creatinine Ratio 07/26/2023 14.4  7.0 - 25.0 Final   • Sodium 07/26/2023 143  136 - 145 mmol/L Final   • Potassium 07/26/2023 4.1  3.5 - 5.2 mmol/L Final   • Chloride 07/26/2023 107  98 - 107 mmol/L Final   • Total CO2 07/26/2023 22.4  22.0 - 29.0 mmol/L Final   • Calcium 07/26/2023 9.5  8.6 - 10.5 mg/dL Final   • Total Protein " 07/26/2023 7.0  6.0 - 8.5 g/dL Final   • Albumin 07/26/2023 4.5  3.5 - 5.2 g/dL Final   • Globulin 07/26/2023 2.5  gm/dL Final   • A/G Ratio 07/26/2023 1.8  g/dL Final   • Total Bilirubin 07/26/2023 1.3 (H)  0.0 - 1.2 mg/dL Final   • Alkaline Phosphatase 07/26/2023 108  39 - 117 U/L Final   • AST (SGOT) 07/26/2023 26  1 - 40 U/L Final   • ALT (SGPT) 07/26/2023 30  1 - 41 U/L Final   • Hemoglobin A1C 07/26/2023 7.70 (H)  4.80 - 5.60 % Final    Comment: Hemoglobin A1C Ranges:  Increased Risk for Diabetes  5.7% to 6.4%  Diabetes                     >= 6.5%  Diabetic Goal                < 7.0%     • Total Cholesterol 07/26/2023 114  0 - 200 mg/dL Final    Comment: Cholesterol Reference Ranges  (U.S. Department of Health and Human Services ATP III  Classifications)  Desirable          <200 mg/dL  Borderline High    200-239 mg/dL  High Risk          >240 mg/dL  Triglyceride Reference Ranges  (U.S. Department of Health and Human Services ATP III  Classifications)  Normal           <150 mg/dL  Borderline High  150-199 mg/dL  High             200-499 mg/dL  Very High        >500 mg/dL  HDL Reference Ranges  (U.S. Department of Health and Human Services ATP III  Classifications)  Low     <40 mg/dl (major risk factor for CHD)  High    >60 mg/dl ('negative' risk factor for CHD)  LDL Reference Ranges  (U.S. Department of Health and Human Services ATP III  Classifications)  Optimal          <100 mg/dL  Near Optimal     100-129 mg/dL  Borderline High  130-159 mg/dL  High             160-189 mg/dL  Very High        >189 mg/dL     • Triglycerides 07/26/2023 99  0 - 150 mg/dL Final   • HDL Cholesterol 07/26/2023 44  40 - 60 mg/dL Final   • VLDL Cholesterol Mateus 07/26/2023 19  5 - 40 mg/dL Final   • LDL Chol Calc (NIH) 07/26/2023 51  0 - 100 mg/dL Final   • TSH 07/26/2023 1.120  0.270 - 4.200 uIU/mL Final   • Interpretation 07/26/2023 Note   Final    Supplemental report is available.     Assessment & Plan  Diagnoses and all orders for  this visit:    1. Type 2 diabetes mellitus with complication, with long-term current use of insulin (Primary)  -     Microalbumin / Creatinine Urine Ratio - Urine, Clean Catch  -     Comprehensive Metabolic Panel  -     Hemoglobin A1c  -     TSH    2. Type 2 diabetes mellitus with microalbuminuria, with long-term current use of insulin  -     Microalbumin / Creatinine Urine Ratio - Urine, Clean Catch  -     Comprehensive Metabolic Panel  -     Hemoglobin A1c  -     TSH    3. Hyperlipidemia, unspecified hyperlipidemia type  -     Comprehensive Metabolic Panel  -     Lipid Panel  -     TSH    4. Primary hypertension  -     Comprehensive Metabolic Panel    5. Coronary artery disease involving native coronary artery of native heart without angina pectoris  -     Lipid Panel    6. Permanent atrial fibrillation  -     Lipid Panel  -     TSH  -     CBC & Differential    7. PAD (peripheral artery disease)    8. Obstructive sleep apnea syndrome    9. History of COPD  -     CBC & Differential    Other orders  -     Insulin Syringes, Disposable, U-100 0.5 ML misc; Use QID  Dispense: 200 each; Refill: 6      Continue Novolin N and Novolin R.  Continue Jardiance per cardiologist.  Follow-up with Dr. Ureña as scheduled.    Continue Zocor 80 mg a day.    Continue Coumadin, Jardiance, enalapril, Lasix, and Lopressor per Dr. Pemberton.    Follow-up with Dr. Vasquez and Dr. Rust.    Copy of my note sent to Dr. Stevens, Dr. Ureña, Dr. Vasquez, Dr. Pemberton and Dr. Jeffery Rust.    RTC 4 mos

## 2024-01-29 NOTE — PROGRESS NOTES
Subjective   Bunny Chacon is a 83 y.o. male.     History of Present Illness     He has type 2 diabetes mellitus and has been on Jardiance 10 mg/day, Novolin N 46 and R 10 every AM and R 11 at supper and N 12 q hs.  He checks his blood sugar 5 times a day. He was switched from analog insulin to human insulin to reduce drug costs. He denies severe hypoglycemic episodes.  He has no early morning hypoglycemia.  He does not want to use a glucose sensor.  Fasting glucose .  -235.  He has no early morning hypoglycemia.  His last meal was at 10 AM.      His last eye examination was in 1/24.  He had vitrectomy twice on the left eye done by Dr. Ureña in 2023. He has macular degeneration and had intraocular injection on left eye.  He has peripheral vision on his right eye.  He had previous bilateral cataract surgery . He has microalbuminuria on urine sample taken last January 2023. He is on enalapril. He denies any paresthesias.      He has hyperlipidemia and has been on Zocor 80 mg once a day,  and fish oil 1000 mg once a day. He denies myalgia.  He has no weight change since 7/23.      He has hypertension and CAD.  He had CABG in 1997 at [facility]. He has been on chronic Coumadin therapy for permanent atrial fibrillation. ProTime is being monitored by Dr. Pemberton.  He is on Jardiance 10 mg/day, enalapril 2.5 mg/day, Lasix 40 mg twice daily and Lopressor 50 mg twice a day.  He denies any bleeding. He denies any chest pain, or palpitations.      He had an arterial ultrasound done in June 2022 which showed right lower extremity with no evidence of hemodynamically significant arterial stenosis.  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  He had an arterial ultrasound of the lower extremities in June 2022 which showed right common iliac artery with greater than 50% stenosis.      Arterial ultrasound done in March 2023 showed occlusion of the left popliteal artery and right  "lower extremity with no evidence of significant stenosis.  Left lower extremity left lower extremity has moderate arthrosis has moderate arterial insufficiency at rest with digit pressure 56 mmHg which is adequate for healing.  Right lower extremity shows normal arterial circulation at rest with digit pressure of 89 mmHg.  He is scheduled arterial ultrasound in February 2024.  He follows with Dr. Vasquez.     He has sleep apnea and uses his CPAP regularly.  He was diagnosed to have COPD by Dr. Jeffery Rust.  He is on Duoneb nebulizer 5 times per day.     He had flu vaccine and COVID-vaccine last fall.    The following portions of the patient's history were reviewed and updated as appropriate: allergies, current medications, past family history, past medical history, past social history, past surgical history, and problem list.    Review of Systems   Eyes:  Positive for visual disturbance.   Respiratory:  Negative for shortness of breath and wheezing.    Cardiovascular:  Negative for chest pain and palpitations.   Gastrointestinal:  Positive for constipation. Negative for anal bleeding and blood in stool.   Genitourinary: Negative.  Negative for hematuria.   Musculoskeletal:  Negative for myalgias.   Neurological:  Negative for numbness.     Vitals:    01/29/24 1219   BP: 104/70   Pulse: 94   Temp: 97.9 °F (36.6 °C)   TempSrc: Temporal   SpO2: 96%   Weight: 92.4 kg (203 lb 9.6 oz)   Height: 165.1 cm (65\")      Objective   Physical Exam  Constitutional:       Appearance: Normal appearance.   Eyes:      General: No scleral icterus.        Right eye: No discharge.         Left eye: No discharge.      Extraocular Movements: Extraocular movements intact.   Neck:      Vascular: No carotid bruit.   Cardiovascular:      Rate and Rhythm: Rhythm irregular.      Heart sounds: No murmur heard.  Pulmonary:      Effort: No respiratory distress.      Breath sounds: Normal breath sounds. No stridor.   Abdominal:      Palpations: " Abdomen is soft.      Tenderness: There is no abdominal tenderness.   Musculoskeletal:      Right lower leg: No edema.      Left lower leg: No edema.      Comments: Feet cool but not cyanotic.  Poor dorsalis pedis pulses bilaterally.  No plantar ulcers.   Lymphadenopathy:      Cervical: No cervical adenopathy.   Skin:     General: Skin is warm.   Neurological:      Mental Status: He is alert and oriented to person, place, and time.      Comments: Intact light touch in lower extremities.       Office Visit on 07/26/2023   Component Date Value Ref Range Status    Glucose 07/26/2023 53 (L)  65 - 99 mg/dL Final    BUN 07/26/2023 15  8 - 23 mg/dL Final    Creatinine 07/26/2023 1.04  0.76 - 1.27 mg/dL Final    EGFR Result 07/26/2023 72.1  >60.0 mL/min/1.73 Final    Comment: GFR Normal >60  Chronic Kidney Disease <60  Kidney Failure <15  The GFR formula is only valid for adults with stable renal  function between ages 18 and 70.      BUN/Creatinine Ratio 07/26/2023 14.4  7.0 - 25.0 Final    Sodium 07/26/2023 143  136 - 145 mmol/L Final    Potassium 07/26/2023 4.1  3.5 - 5.2 mmol/L Final    Chloride 07/26/2023 107  98 - 107 mmol/L Final    Total CO2 07/26/2023 22.4  22.0 - 29.0 mmol/L Final    Calcium 07/26/2023 9.5  8.6 - 10.5 mg/dL Final    Total Protein 07/26/2023 7.0  6.0 - 8.5 g/dL Final    Albumin 07/26/2023 4.5  3.5 - 5.2 g/dL Final    Globulin 07/26/2023 2.5  gm/dL Final    A/G Ratio 07/26/2023 1.8  g/dL Final    Total Bilirubin 07/26/2023 1.3 (H)  0.0 - 1.2 mg/dL Final    Alkaline Phosphatase 07/26/2023 108  39 - 117 U/L Final    AST (SGOT) 07/26/2023 26  1 - 40 U/L Final    ALT (SGPT) 07/26/2023 30  1 - 41 U/L Final    Hemoglobin A1C 07/26/2023 7.70 (H)  4.80 - 5.60 % Final    Comment: Hemoglobin A1C Ranges:  Increased Risk for Diabetes  5.7% to 6.4%  Diabetes                     >= 6.5%  Diabetic Goal                < 7.0%      Total Cholesterol 07/26/2023 114  0 - 200 mg/dL Final    Comment: Cholesterol  Reference Ranges  (U.S. Department of Health and Human Services ATP III  Classifications)  Desirable          <200 mg/dL  Borderline High    200-239 mg/dL  High Risk          >240 mg/dL  Triglyceride Reference Ranges  (U.S. Department of Health and Human Services ATP III  Classifications)  Normal           <150 mg/dL  Borderline High  150-199 mg/dL  High             200-499 mg/dL  Very High        >500 mg/dL  HDL Reference Ranges  (U.S. Department of Health and Human Services ATP III  Classifications)  Low     <40 mg/dl (major risk factor for CHD)  High    >60 mg/dl ('negative' risk factor for CHD)  LDL Reference Ranges  (U.S. Department of Health and Human Services ATP III  Classifications)  Optimal          <100 mg/dL  Near Optimal     100-129 mg/dL  Borderline High  130-159 mg/dL  High             160-189 mg/dL  Very High        >189 mg/dL      Triglycerides 07/26/2023 99  0 - 150 mg/dL Final    HDL Cholesterol 07/26/2023 44  40 - 60 mg/dL Final    VLDL Cholesterol Mateus 07/26/2023 19  5 - 40 mg/dL Final    LDL Chol Calc (NIH) 07/26/2023 51  0 - 100 mg/dL Final    TSH 07/26/2023 1.120  0.270 - 4.200 uIU/mL Final    Interpretation 07/26/2023 Note   Final    Supplemental report is available.     Assessment & Plan   Diagnoses and all orders for this visit:    1. Type 2 diabetes mellitus with complication, with long-term current use of insulin (Primary)  -     Microalbumin / Creatinine Urine Ratio - Urine, Clean Catch  -     Comprehensive Metabolic Panel  -     Hemoglobin A1c  -     TSH    2. Type 2 diabetes mellitus with microalbuminuria, with long-term current use of insulin  -     Microalbumin / Creatinine Urine Ratio - Urine, Clean Catch  -     Comprehensive Metabolic Panel  -     Hemoglobin A1c  -     TSH    3. Hyperlipidemia, unspecified hyperlipidemia type  -     Comprehensive Metabolic Panel  -     Lipid Panel  -     TSH    4. Primary hypertension  -     Comprehensive Metabolic Panel    5. Coronary artery  disease involving native coronary artery of native heart without angina pectoris  -     Lipid Panel    6. Permanent atrial fibrillation  -     Lipid Panel  -     TSH  -     CBC & Differential    7. PAD (peripheral artery disease)    8. Obstructive sleep apnea syndrome    9. History of COPD  -     CBC & Differential    Other orders  -     Insulin Syringes, Disposable, U-100 0.5 ML misc; Use QID  Dispense: 200 each; Refill: 6      Continue Novolin N and Novolin R.  Continue Jardiance per cardiologist.  Follow-up with Dr. Ureña as scheduled.    Continue Zocor 80 mg a day.    Continue Coumadin, Jardiance, enalapril, Lasix, and Lopressor per Dr. Pemberton.    Follow-up with Dr. Vasquez and Dr. Rust.    Copy of my note sent to Dr. Stevens, Dr. Ureña, Dr. Vasquez, Dr. Pemberton and Dr. Jeffery Rust.    RTC 4 mos

## 2024-01-30 DIAGNOSIS — I48.0 PAROXYSMAL ATRIAL FIBRILLATION: ICD-10-CM

## 2024-01-30 DIAGNOSIS — I25.10 CORONARY ARTERIOSCLEROSIS IN NATIVE ARTERY: ICD-10-CM

## 2024-01-30 DIAGNOSIS — Z79.4 TYPE 2 DIABETES MELLITUS WITH COMPLICATION, WITH LONG-TERM CURRENT USE OF INSULIN: ICD-10-CM

## 2024-01-30 DIAGNOSIS — E11.8 TYPE 2 DIABETES MELLITUS WITH COMPLICATION, WITH LONG-TERM CURRENT USE OF INSULIN: ICD-10-CM

## 2024-01-30 DIAGNOSIS — I10 ESSENTIAL HYPERTENSION: ICD-10-CM

## 2024-01-30 LAB
ALBUMIN SERPL-MCNC: 4 G/DL (ref 3.5–5.2)
ALBUMIN/CREAT UR: 37 MG/G CREAT (ref 0–29)
ALBUMIN/GLOB SERPL: 1.4 G/DL
ALP SERPL-CCNC: 122 U/L (ref 39–117)
ALT SERPL-CCNC: 34 U/L (ref 1–41)
AST SERPL-CCNC: 26 U/L (ref 1–40)
BASOPHILS # BLD AUTO: 0.06 10*3/MM3 (ref 0–0.2)
BASOPHILS NFR BLD AUTO: 0.6 % (ref 0–1.5)
BILIRUB SERPL-MCNC: 1.1 MG/DL (ref 0–1.2)
BUN SERPL-MCNC: 16 MG/DL (ref 8–23)
BUN/CREAT SERPL: 16.7 (ref 7–25)
CALCIUM SERPL-MCNC: 9.1 MG/DL (ref 8.6–10.5)
CHLORIDE SERPL-SCNC: 105 MMOL/L (ref 98–107)
CHOLEST SERPL-MCNC: 118 MG/DL (ref 0–200)
CO2 SERPL-SCNC: 23.7 MMOL/L (ref 22–29)
CREAT SERPL-MCNC: 0.96 MG/DL (ref 0.76–1.27)
CREAT UR-MCNC: 34 MG/DL
EGFRCR SERPLBLD CKD-EPI 2021: 78.9 ML/MIN/1.73
EOSINOPHIL # BLD AUTO: 0.14 10*3/MM3 (ref 0–0.4)
EOSINOPHIL NFR BLD AUTO: 1.5 % (ref 0.3–6.2)
ERYTHROCYTE [DISTWIDTH] IN BLOOD BY AUTOMATED COUNT: 14.4 % (ref 12.3–15.4)
GLOBULIN SER CALC-MCNC: 2.8 GM/DL
GLUCOSE SERPL-MCNC: 63 MG/DL (ref 65–99)
HBA1C MFR BLD: 8.2 % (ref 4.8–5.6)
HCT VFR BLD AUTO: 48 % (ref 37.5–51)
HDLC SERPL-MCNC: 43 MG/DL (ref 40–60)
HGB BLD-MCNC: 16.4 G/DL (ref 13–17.7)
IMM GRANULOCYTES # BLD AUTO: 0.07 10*3/MM3 (ref 0–0.05)
IMM GRANULOCYTES NFR BLD AUTO: 0.8 % (ref 0–0.5)
IMP & REVIEW OF LAB RESULTS: NORMAL
LDLC SERPL CALC-MCNC: 57 MG/DL (ref 0–100)
LYMPHOCYTES # BLD AUTO: 1.23 10*3/MM3 (ref 0.7–3.1)
LYMPHOCYTES NFR BLD AUTO: 13.3 % (ref 19.6–45.3)
MCH RBC QN AUTO: 33.6 PG (ref 26.6–33)
MCHC RBC AUTO-ENTMCNC: 34.2 G/DL (ref 31.5–35.7)
MCV RBC AUTO: 98.4 FL (ref 79–97)
MICROALBUMIN UR-MCNC: 12.6 UG/ML
MONOCYTES # BLD AUTO: 0.88 10*3/MM3 (ref 0.1–0.9)
MONOCYTES NFR BLD AUTO: 9.5 % (ref 5–12)
NEUTROPHILS # BLD AUTO: 6.89 10*3/MM3 (ref 1.7–7)
NEUTROPHILS NFR BLD AUTO: 74.3 % (ref 42.7–76)
NRBC BLD AUTO-RTO: 0 /100 WBC (ref 0–0.2)
PLATELET # BLD AUTO: 230 10*3/MM3 (ref 140–450)
POTASSIUM SERPL-SCNC: 4.6 MMOL/L (ref 3.5–5.2)
PROT SERPL-MCNC: 6.8 G/DL (ref 6–8.5)
RBC # BLD AUTO: 4.88 10*6/MM3 (ref 4.14–5.8)
SODIUM SERPL-SCNC: 140 MMOL/L (ref 136–145)
TRIGL SERPL-MCNC: 96 MG/DL (ref 0–150)
TSH SERPL DL<=0.005 MIU/L-ACNC: 1.17 UIU/ML (ref 0.27–4.2)
VLDLC SERPL CALC-MCNC: 18 MG/DL (ref 5–40)
WBC # BLD AUTO: 9.27 10*3/MM3 (ref 3.4–10.8)

## 2024-01-30 RX ORDER — SIMVASTATIN 80 MG
80 TABLET ORAL DAILY
Qty: 90 TABLET | Refills: 3 | Status: SHIPPED | OUTPATIENT
Start: 2024-01-30

## 2024-01-30 RX ORDER — ENALAPRIL MALEATE 2.5 MG/1
2.5 TABLET ORAL DAILY
Qty: 90 TABLET | Refills: 3 | Status: SHIPPED | OUTPATIENT
Start: 2024-01-30

## 2024-01-30 RX ORDER — METOPROLOL TARTRATE 50 MG/1
50 TABLET, FILM COATED ORAL EVERY 12 HOURS
Qty: 180 TABLET | Refills: 3 | Status: SHIPPED | OUTPATIENT
Start: 2024-01-30

## 2024-01-30 NOTE — TELEPHONE ENCOUNTER
Caller: Summer De Jesus    Relationship: Emergency Contact    Requested Prescriptions:   Requested Prescriptions     Pending Prescriptions Disp Refills    metoprolol tartrate (LOPRESSOR) 50 MG tablet 180 tablet 3     Sig: Take 1 tablet by mouth Every 12 (Twelve) Hours.    enalapril (VASOTEC) 2.5 MG tablet 90 tablet 3     Sig: Take 1 tablet by mouth Daily.    simvastatin (ZOCOR) 80 MG tablet 90 tablet 3     Sig: Take 1 tablet by mouth Daily.        Pharmacy where request should be sent: [facility] - [facility] [address] - [phone]  - [phone] FX     Last office visit with prescribing clinician: 1/18/2024   Last telemedicine visit with prescribing clinician: Visit date not found   Next office visit with prescribing clinician: 1/24/2025     Additional details provided by patient: PATIENT REQUESTING 90 DAYS WITH 3 REFILLS ONE EACH.       Does the patient have less than a 3 day supply:  [] Yes  [x] No    Would you like a call back once the refill request has been completed: [] Yes [x] No    If the office needs to give you a call back, can they leave a voicemail: [] Yes [x] No    Dick Tucker Rep   01/30/24 13:47 EST

## 2024-01-31 NOTE — PROGRESS NOTES
Urine microalbumin elevated.  Continue enalapril and Jardiance  Hemoglobin A1c slightly higher at 8.2%.  Suggest starting on Dexcom G7.  It should be covered through their Medicare part B.  Normal hemoglobin and hematocrit.  Normal WBC.  LDL 57.  HDL 43.  Continue Zocor 80 mg/day and fish oil.  Normal thyroid function tests.  Copy of labs sent to Dr. Jose Stevens.  Send copy of labs to Dr. Vasquez, Dr. Ureña and Dr. Jeffery Rust.  Please notify patient's wife of results and instructions.

## 2024-02-14 DIAGNOSIS — I25.10 CORONARY ARTERIOSCLEROSIS IN NATIVE ARTERY: ICD-10-CM

## 2024-02-14 RX ORDER — FUROSEMIDE 40 MG/1
TABLET ORAL
Qty: 180 TABLET | Refills: 3 | Status: SHIPPED | OUTPATIENT
Start: 2024-02-14

## 2024-03-08 ENCOUNTER — TELEPHONE (OUTPATIENT)
Dept: ENDOCRINOLOGY | Age: 84
End: 2024-03-08
Payer: MEDICARE

## 2024-03-08 ENCOUNTER — TELEPHONE (OUTPATIENT)
Dept: FAMILY MEDICINE CLINIC | Facility: CLINIC | Age: 84
End: 2024-03-08

## 2024-03-08 NOTE — TELEPHONE ENCOUNTER
Pt's wife is aware.     Dr. Rios's office already has the process started for the farzad 2/3. She asked that we cancel ours that we ordered. Per RRJ, he cancelled ordered

## 2024-03-08 NOTE — TELEPHONE ENCOUNTER
Patient's wife called asking for Dr. Rios's practice ID because he is in the hospital and they are getting him on a freestyle farzad.    I gave her his NPI but if there is another ID he needs to use, can we call her and give her that information

## 2024-03-08 NOTE — TELEPHONE ENCOUNTER
Patient's wife called. She is asking if the NPI is the correct ID they need in order to link Dr. Rios with the patient's [facility]

## 2024-03-08 NOTE — TELEPHONE ENCOUNTER
Called and spoke with patient's wife to see what she was needing. Pt was needing to start pt on farzad 3 sensors but through DME. Started dme order through [facility].

## 2024-03-08 NOTE — TELEPHONE ENCOUNTER
Caller: Summer De Jesus    Relationship: Emergency Contact    Best call back number: [phone]    Equipment requested: FREESTYLE TIA 3     Reason for the request:WITH THE FREESTYLE TIA 2 SENSOR YOU HAVE TO KEEP PUTTING IN INFORMATION CONTINUOUSLY BUT WITH THE FREESTYLE TIA 3 YOU DON'T HAVE TO.   Prescribing Provider: DR. DAVID IGLSEIAS    Additional information or concerns: PT STATED THAT THERE WAS PAPERWORK TO BE FILLED OUT FOR THE ADDITIONAL ASSISTANCE PROGRAM AND THE SPOUSE WOULD LIKE IT TO SHOW FOR THE TIA 3 IF POSSIBLE. PT WOULD NEED THE DOCTOR'S PRACTICE ID# TO PROGRAM INTO OUR PHONE ON THE LINK. THE SPOUSE HAS THIS ID# [id number] AND IS NOT SURE IF IT'S CORRECT. PLEASE CALL THE SPOUSE BACK.

## 2024-03-08 NOTE — TELEPHONE ENCOUNTER
Caller: InesSummer    Relationship: Emergency Contact    Best call back number: [phone]     What is the best time to reach you: ANYTIME    Who are you requesting to speak with (clinical staff, provider,  specific staff member): DR REYNAGA    What was the call regarding: PATIENTS WIFE STATED THE PATIENT WAS BROUGHT TO [facility] ON 03- AFTER HAVING ASPIRATED AND GONE INTO CARDIAC ARREST DURING EYE OPERATION .    PATIENTS WIFE STATED THE PATIENT WAS MOVE FROM ICU TO CCU ON 03-.    PATIENTS WIFE STATED THE CARDIAC DOCTOR AT THE HOSPITAL HAS ORDERED THE PATIENT A TIA VIEW 3 AS HIS BLOOD SUGAR WAS UP  AND ERRATIC.    PATIENTS WIFE STATED MEDICARE WILL NOT COVER THIS, AND A 1 MONTH SUPPLY OF SENSORS WOULD COST 400 DOLLARS.    PATIENTS WIFE STATED THEY WOULD BE OKAY WITH TIA VIEW 2 INSTEAD IF NEEDED.    PATIENTS WIFE STATED THE MEDICARE EXCEPTION FORM WAS SENT TO DR REYNAGA, HOWEVER MUST BE FORWARDED TO DR IGLESIAS FOR COMPLETION.    PATIENTS WIFE IS REQUESTING THIS FORM BE SENT TO DR IGLESIAS ASAP.    PLEASE CALL IF  NEEDED FOR ANY FURTHER QUESTIONS.        PATIENTS WIFE STATED SHE WOULD LIKE TO APOLOGIZE FOR NOT HAVING INFORMED DR REYNAGA IMMEDIATELY AS SHE HAS BEEN DEALING WITH MULTIPLE DOCTORS AND STRESS.

## 2024-03-11 ENCOUNTER — TELEPHONE (OUTPATIENT)
Dept: FAMILY MEDICINE CLINIC | Facility: CLINIC | Age: 84
End: 2024-03-11

## 2024-03-11 ENCOUNTER — TELEPHONE (OUTPATIENT)
Dept: FAMILY MEDICINE CLINIC | Facility: CLINIC | Age: 84
End: 2024-03-11
Payer: MEDICARE

## 2024-03-11 NOTE — TELEPHONE ENCOUNTER
Pt is going to be on Freestyle farzad 3. I have responded back to pt's wife via [employer] message with the proper practice id.

## 2024-03-11 NOTE — TELEPHONE ENCOUNTER
Caller: WES REYNAGA    Relationship: MENG    Best call back number: [phone]     What orders are you requesting (i.e. lab or imaging): SKILLED NURSING, PHYSICAL THERAPY AND OCCUPATIONAL THERAPY EVALUATION    Additional notes: PATIENT WAS RECENTLY DISCHARGED FROM [facility] [place] REQUESTS CALL BACK WITH VERBAL ORDERS FOR HOME HEALTH

## 2024-03-11 NOTE — TELEPHONE ENCOUNTER
Caller: [facility]    Relationship to patient:     Best call back number: [phone]     New or established patient?  [] New  [x] Established    Date of discharge: 03/11/24    Facility discharged from:[facility]    Diagnosis/Symptoms: ACUTE RESPIRATORY FAILURE / AFIB     Length of stay (If applicable): 03/05/24-03/11/24    Specialty Only: Did you see a Jehovah's witness health provider?    [] Yes  [] No  If so, who?

## 2024-03-29 ENCOUNTER — OFFICE VISIT (OUTPATIENT)
Dept: FAMILY MEDICINE CLINIC | Facility: CLINIC | Age: 84
End: 2024-03-29
Payer: MEDICARE

## 2024-03-29 VITALS
HEART RATE: 77 BPM | DIASTOLIC BLOOD PRESSURE: 60 MMHG | SYSTOLIC BLOOD PRESSURE: 108 MMHG | OXYGEN SATURATION: 96 % | WEIGHT: 198.8 LBS | HEIGHT: 65 IN | BODY MASS INDEX: 33.12 KG/M2

## 2024-03-29 DIAGNOSIS — F51.01 PRIMARY INSOMNIA: ICD-10-CM

## 2024-03-29 DIAGNOSIS — I10 PRIMARY HYPERTENSION: ICD-10-CM

## 2024-03-29 DIAGNOSIS — Z09 HOSPITAL DISCHARGE FOLLOW-UP: Primary | ICD-10-CM

## 2024-03-29 DIAGNOSIS — I50.31 ACUTE DIASTOLIC CHF (CONGESTIVE HEART FAILURE): ICD-10-CM

## 2024-03-29 RX ORDER — METOPROLOL SUCCINATE 25 MG/1
25 TABLET, EXTENDED RELEASE ORAL DAILY
COMMUNITY

## 2024-03-29 RX ORDER — LOSARTAN POTASSIUM 25 MG/1
25 TABLET ORAL DAILY
COMMUNITY
End: 2024-03-29 | Stop reason: SDUPTHER

## 2024-03-29 RX ORDER — LOSARTAN POTASSIUM 25 MG/1
25 TABLET ORAL DAILY
Qty: 90 TABLET | Refills: 3 | Status: SHIPPED | OUTPATIENT
Start: 2024-03-29

## 2024-03-29 RX ORDER — TRAZODONE HYDROCHLORIDE 50 MG/1
50 TABLET ORAL NIGHTLY
Qty: 90 TABLET | Refills: 3 | Status: SHIPPED | OUTPATIENT
Start: 2024-03-29

## 2024-03-29 NOTE — PROGRESS NOTES
"Chief Complaint  Hospital Follow Up Visit (CHF)    Subjective      Bunny Chacon presents to [facility]  History of Present Illness  He was having oil taken off his eye from macular degeneration, he was lying flat on his back and went into CHF at the optometrist office.  He was transferred to [facility] across the street and on his way to ICU, they performed a chest -x-ray and found he had double pneumonia.  He spent 11 days in the hospital.    Objective   Vital Signs:  /60   Pulse 77   Ht 165.1 cm (65\")   Wt 90.2 kg (198 lb 12.8 oz)   SpO2 96%   BMI 33.08 kg/m²   Estimated body mass index is 33.08 kg/m² as calculated from the following:    Height as of this encounter: 165.1 cm (65\").    Weight as of this encounter: 90.2 kg (198 lb 12.8 oz).     Physical Exam  Constitutional:       Appearance: He is obese.   HENT:      Head: Normocephalic and atraumatic.      Mouth/Throat:      Mouth: Mucous membranes are moist.   Eyes:      Pupils: Pupils are equal, round, and reactive to light.   Cardiovascular:      Rate and Rhythm: Normal rate and regular rhythm.      Pulses: Normal pulses.      Heart sounds: Normal heart sounds.   Pulmonary:      Effort: Pulmonary effort is normal.      Breath sounds: Normal breath sounds.   Abdominal:      General: Bowel sounds are normal.   Musculoskeletal:         General: Normal range of motion.   Skin:     General: Skin is warm and dry.      Capillary Refill: Capillary refill takes less than 2 seconds.   Neurological:      General: No focal deficit present.      Mental Status: He is alert.   Psychiatric:         Mood and Affect: Mood normal.             Assessment and Plan   Diagnoses and all orders for this visit:    1. Hospital discharge follow-up (Primary)    2. Acute diastolic CHF (congestive heart failure)    3. Primary hypertension  -     losartan (COZAAR) 25 MG tablet; Take 1 tablet by mouth Daily.  Dispense: 90 tablet; Refill: 3    4. Primary " insomnia  -     traZODone (DESYREL) 50 MG tablet; Take 1 tablet by mouth Every Night.  Dispense: 90 tablet; Refill: 3           Follow Up     Discussed Care Gaps, ordered referrals and encouraged vaccination updates.       - Pt agrees with plan of care and denies further questions/concerns today  - This document is intended for medical expert use only. Persons  reading this document without medical staff guidance may result in misinterpretation and unintended morbidity     Go to the ER for any possible life-threatening symptoms such as chest pain or shortness of air.      Please allow 3-5 business days for recommendations based on new results      I personally spent time with this patient, preparing for the visit, reviewing tests, obtaining and/or reviewing a separately obtained history, performing a medically appropriate examination and/or evaluation, counseling and educating the patient/family/caregiver, ordering medications,  documenting information in the medical record and indepentently interpreting results.       Return in about 43 weeks (around 1/24/2025) for Next scheduled follow up with Dr. Stevens.  Patient was given instructions and counseling regarding his condition or for health maintenance advice. Please see specific information pulled into the AVS if appropriate.

## 2024-04-05 DIAGNOSIS — Z79.4 TYPE 2 DIABETES MELLITUS WITH MICROALBUMINURIA, WITH LONG-TERM CURRENT USE OF INSULIN: Primary | ICD-10-CM

## 2024-04-05 DIAGNOSIS — R80.9 TYPE 2 DIABETES MELLITUS WITH MICROALBUMINURIA, WITH LONG-TERM CURRENT USE OF INSULIN: Primary | ICD-10-CM

## 2024-04-05 DIAGNOSIS — E11.29 TYPE 2 DIABETES MELLITUS WITH MICROALBUMINURIA, WITH LONG-TERM CURRENT USE OF INSULIN: Primary | ICD-10-CM

## 2024-04-05 RX ORDER — HUMAN INSULIN 100 [IU]/ML
INJECTION, SOLUTION SUBCUTANEOUS
Qty: 30 ML | Refills: 2 | Status: SHIPPED | OUTPATIENT
Start: 2024-04-05

## 2024-04-05 RX ORDER — HUMAN INSULIN 100 [IU]/ML
INJECTION, SUSPENSION SUBCUTANEOUS
Qty: 60 ML | Refills: 2 | Status: SHIPPED | OUTPATIENT
Start: 2024-04-05

## 2024-04-09 DIAGNOSIS — R80.9 TYPE 2 DIABETES MELLITUS WITH MICROALBUMINURIA, WITH LONG-TERM CURRENT USE OF INSULIN: Primary | ICD-10-CM

## 2024-04-09 DIAGNOSIS — E11.29 TYPE 2 DIABETES MELLITUS WITH MICROALBUMINURIA, WITH LONG-TERM CURRENT USE OF INSULIN: Primary | ICD-10-CM

## 2024-04-09 DIAGNOSIS — Z79.4 TYPE 2 DIABETES MELLITUS WITH MICROALBUMINURIA, WITH LONG-TERM CURRENT USE OF INSULIN: Primary | ICD-10-CM

## 2024-04-09 RX ORDER — HUMAN INSULIN 100 [IU]/ML
INJECTION, SUSPENSION SUBCUTANEOUS
Qty: 60 ML | Refills: 2 | Status: SHIPPED | OUTPATIENT
Start: 2024-04-09

## 2024-05-11 ENCOUNTER — READMISSION MANAGEMENT (OUTPATIENT)
Dept: CALL CENTER | Facility: HOSPITAL | Age: 84
End: 2024-05-11
Payer: MEDICARE

## 2024-05-13 ENCOUNTER — TELEPHONE (OUTPATIENT)
Dept: FAMILY MEDICINE CLINIC | Facility: CLINIC | Age: 84
End: 2024-05-13
Payer: MEDICARE

## 2024-05-13 NOTE — TELEPHONE ENCOUNTER
CALLER:  Amita with [facility]     Patient was discharged from  on 05/10/2024

## 2024-06-10 DIAGNOSIS — I25.10 CORONARY ARTERIOSCLEROSIS IN NATIVE ARTERY: ICD-10-CM

## 2024-06-10 RX ORDER — SIMVASTATIN 40 MG
40 TABLET ORAL DAILY
Qty: 90 TABLET | Refills: 3 | Status: SHIPPED | OUTPATIENT
Start: 2024-06-10 | End: 2024-06-14 | Stop reason: SDUPTHER

## 2024-06-14 DIAGNOSIS — I25.10 CORONARY ARTERIOSCLEROSIS IN NATIVE ARTERY: ICD-10-CM

## 2024-06-14 RX ORDER — SIMVASTATIN 40 MG
40 TABLET ORAL DAILY
Qty: 90 TABLET | Refills: 3 | Status: SHIPPED | OUTPATIENT
Start: 2024-06-14